# Patient Record
Sex: FEMALE | Race: WHITE | NOT HISPANIC OR LATINO | Employment: OTHER | URBAN - NONMETROPOLITAN AREA
[De-identification: names, ages, dates, MRNs, and addresses within clinical notes are randomized per-mention and may not be internally consistent; named-entity substitution may affect disease eponyms.]

---

## 2023-02-27 PROBLEM — M47.816 LUMBAR SPONDYLOSIS: Status: ACTIVE | Noted: 2023-02-27

## 2023-02-27 PROBLEM — E11.9 DM W/O COMPLICATION TYPE II (MULTI): Status: ACTIVE | Noted: 2023-02-27

## 2023-02-27 PROBLEM — I61.4: Status: ACTIVE | Noted: 2023-02-27

## 2023-02-27 PROBLEM — R25.1 TREMOR: Status: ACTIVE | Noted: 2023-02-27

## 2023-02-27 PROBLEM — H53.2 DIPLOPIA: Status: ACTIVE | Noted: 2023-02-27

## 2023-02-27 PROBLEM — R42 DIZZINESS, NONSPECIFIC: Status: ACTIVE | Noted: 2023-02-27

## 2023-02-27 PROBLEM — M47.812 CERVICAL FACET SYNDROME: Status: ACTIVE | Noted: 2023-02-27

## 2023-02-27 PROBLEM — G62.9 NEUROPATHY: Status: ACTIVE | Noted: 2023-02-27

## 2023-02-27 PROBLEM — I10 BENIGN ESSENTIAL HYPERTENSION: Status: ACTIVE | Noted: 2023-02-27

## 2023-02-27 PROBLEM — N28.9 RENAL INSUFFICIENCY: Status: ACTIVE | Noted: 2023-02-27

## 2023-02-27 PROBLEM — I63.9 CVA (CEREBRAL VASCULAR ACCIDENT) (MULTI): Status: ACTIVE | Noted: 2023-02-27

## 2023-02-27 PROBLEM — M25.512 PAIN IN JOINT OF LEFT SHOULDER: Status: ACTIVE | Noted: 2023-02-27

## 2023-02-27 PROBLEM — N28.1 RENAL CYST: Status: ACTIVE | Noted: 2023-02-27

## 2023-02-27 PROBLEM — G93.89 CEREBRAL VENTRICULOMEGALY: Status: ACTIVE | Noted: 2023-02-27

## 2023-02-27 PROBLEM — R41.0 CONFUSION: Status: ACTIVE | Noted: 2023-02-27

## 2023-02-27 PROBLEM — G47.00 INSOMNIA: Status: ACTIVE | Noted: 2023-02-27

## 2023-02-27 PROBLEM — R91.1 LUNG NODULE: Status: ACTIVE | Noted: 2023-02-27

## 2023-02-27 PROBLEM — M25.512 LEFT SHOULDER PAIN: Status: ACTIVE | Noted: 2023-02-27

## 2023-02-27 PROBLEM — D69.6 ACQUIRED THROMBOCYTOPENIA (CMS-HCC): Status: ACTIVE | Noted: 2023-02-27

## 2023-02-27 PROBLEM — M50.30 DEGENERATION OF CERVICAL INTERVERTEBRAL DISC: Status: ACTIVE | Noted: 2023-02-27

## 2023-02-27 PROBLEM — B02.9 HERPES ZOSTER: Status: ACTIVE | Noted: 2023-02-27

## 2023-02-27 PROBLEM — H49.23: Status: ACTIVE | Noted: 2023-02-27

## 2023-02-27 PROBLEM — E66.811 CLASS 1 OBESITY WITH BODY MASS INDEX (BMI) OF 31.0 TO 31.9 IN ADULT: Status: ACTIVE | Noted: 2023-02-27

## 2023-02-27 PROBLEM — L30.9 ECZEMA: Status: ACTIVE | Noted: 2023-02-27

## 2023-02-27 PROBLEM — M51.26 LUMBAR DISC HERNIATION: Status: ACTIVE | Noted: 2023-02-27

## 2023-02-27 PROBLEM — J32.2 ETHMOID SINUSITIS: Status: ACTIVE | Noted: 2023-02-27

## 2023-02-27 PROBLEM — H50.00 ESOTROPIA: Status: ACTIVE | Noted: 2023-02-27

## 2023-02-27 PROBLEM — E66.9 CLASS 1 OBESITY WITH BODY MASS INDEX (BMI) OF 31.0 TO 31.9 IN ADULT: Status: ACTIVE | Noted: 2023-02-27

## 2023-02-27 PROBLEM — M16.12 OSTEOARTHRITIS OF LEFT HIP: Status: ACTIVE | Noted: 2023-02-27

## 2023-02-27 PROBLEM — I61.9 ICH (INTRACEREBRAL HEMORRHAGE) (MULTI): Status: ACTIVE | Noted: 2023-02-27

## 2023-02-27 PROBLEM — M25.559 HIP PAIN: Status: ACTIVE | Noted: 2023-02-27

## 2023-02-27 PROBLEM — E55.9 VITAMIN D DEFICIENCY: Status: ACTIVE | Noted: 2023-02-27

## 2023-02-27 PROBLEM — K21.9 GERD (GASTROESOPHAGEAL REFLUX DISEASE): Status: ACTIVE | Noted: 2023-02-27

## 2023-02-27 PROBLEM — R10.13 EPIGASTRIC PAIN: Status: ACTIVE | Noted: 2023-02-27

## 2023-02-27 PROBLEM — E78.5 HLD (HYPERLIPIDEMIA): Status: ACTIVE | Noted: 2023-02-27

## 2023-02-27 PROBLEM — M96.1 POST LAMINECTOMY SYNDROME: Status: ACTIVE | Noted: 2023-02-27

## 2023-02-27 PROBLEM — M54.2 CERVICALGIA: Status: ACTIVE | Noted: 2023-02-27

## 2023-02-27 PROBLEM — R73.09 ELEVATED GLUCOSE: Status: ACTIVE | Noted: 2023-02-27

## 2023-02-27 PROBLEM — F32.A DEPRESSION: Status: ACTIVE | Noted: 2023-02-27

## 2023-02-27 PROBLEM — M79.602 LEFT ARM PAIN: Status: ACTIVE | Noted: 2023-02-27

## 2023-02-27 PROBLEM — J30.9 ALLERGIC RHINITIS: Status: ACTIVE | Noted: 2023-02-27

## 2023-02-27 PROBLEM — M48.061 LUMBAR STENOSIS: Status: ACTIVE | Noted: 2023-02-27

## 2023-02-27 PROBLEM — K75.81 NASH (NONALCOHOLIC STEATOHEPATITIS): Status: ACTIVE | Noted: 2023-02-27

## 2023-02-27 PROBLEM — M50.20 CERVICAL DISC HERNIATION: Status: ACTIVE | Noted: 2023-02-27

## 2023-02-27 PROBLEM — M47.812 SPONDYLOSIS OF CERVICAL REGION WITHOUT MYELOPATHY OR RADICULOPATHY: Status: ACTIVE | Noted: 2023-02-27

## 2023-02-27 PROBLEM — N39.0 URINARY TRACT INFECTION: Status: ACTIVE | Noted: 2023-02-27

## 2023-02-27 PROBLEM — M48.02 CERVICAL STENOSIS OF SPINE: Status: ACTIVE | Noted: 2023-02-27

## 2023-02-27 PROBLEM — M54.2 NECK PAIN: Status: ACTIVE | Noted: 2023-02-27

## 2023-02-27 PROBLEM — H91.90 HEARING DIFFICULTY: Status: ACTIVE | Noted: 2023-02-27

## 2023-02-27 RX ORDER — BUPROPION HYDROCHLORIDE 150 MG/1
1 TABLET ORAL DAILY
COMMUNITY
Start: 2022-02-22 | End: 2023-05-08

## 2023-02-27 RX ORDER — ATORVASTATIN CALCIUM 20 MG/1
1 TABLET, FILM COATED ORAL DAILY
COMMUNITY
Start: 2019-01-21 | End: 2023-03-08

## 2023-02-27 RX ORDER — ACETAMINOPHEN 500 MG
50 TABLET ORAL DAILY
COMMUNITY
Start: 2021-01-20 | End: 2023-06-30 | Stop reason: ALTCHOICE

## 2023-02-27 RX ORDER — ESCITALOPRAM OXALATE 20 MG/1
1 TABLET ORAL DAILY
COMMUNITY
Start: 2013-11-26 | End: 2023-03-14 | Stop reason: SDUPTHER

## 2023-02-27 RX ORDER — ALCLOMETASONE DIPROPIONATE 0.5 MG/G
CREAM TOPICAL 2 TIMES DAILY
COMMUNITY
Start: 2018-11-06

## 2023-02-27 RX ORDER — MELOXICAM 15 MG/1
1 TABLET ORAL DAILY
COMMUNITY
Start: 2019-06-28 | End: 2023-03-24

## 2023-02-27 RX ORDER — LORAZEPAM 1 MG/1
1 TABLET ORAL NIGHTLY PRN
COMMUNITY
Start: 2022-05-11 | End: 2023-08-29

## 2023-02-27 RX ORDER — ONDANSETRON 4 MG/1
1 TABLET, FILM COATED ORAL EVERY 6 HOURS
COMMUNITY
End: 2024-01-08

## 2023-02-27 RX ORDER — PROPRANOLOL HYDROCHLORIDE 80 MG/1
80 CAPSULE, EXTENDED RELEASE ORAL DAILY
COMMUNITY
End: 2023-04-03 | Stop reason: SDUPTHER

## 2023-03-08 DIAGNOSIS — E78.5 HYPERLIPIDEMIA, UNSPECIFIED HYPERLIPIDEMIA TYPE: Primary | ICD-10-CM

## 2023-03-08 DIAGNOSIS — I10 PRIMARY HYPERTENSION: ICD-10-CM

## 2023-03-08 RX ORDER — ATORVASTATIN CALCIUM 20 MG/1
TABLET, FILM COATED ORAL
Qty: 90 TABLET | Refills: 0 | Status: SHIPPED | OUTPATIENT
Start: 2023-03-08 | End: 2023-08-28

## 2023-03-08 RX ORDER — LISINOPRIL 20 MG/1
1 TABLET ORAL DAILY
COMMUNITY
Start: 2023-02-24 | End: 2023-03-08 | Stop reason: SDUPTHER

## 2023-03-08 RX ORDER — LISINOPRIL 20 MG/1
20 TABLET ORAL DAILY
Qty: 30 TABLET | Refills: 0 | Status: SHIPPED | OUTPATIENT
Start: 2023-03-08 | End: 2023-03-14 | Stop reason: SDUPTHER

## 2023-03-10 NOTE — PROGRESS NOTES
Subjective   Patient ID: Karina Lee is a 82 y.o. female who presents for Follow-up (Discuss home health; watery eyes, especially in AM).  HPI  A 82 year White F presenting for followup:    Hypertensive urgency: Had her med changed. Did not bring list. No further shortness of breath.    Left eye drainage: She is getting tearing. No itching or pain, she is getting discomfort at night. Going on for a couple of weeks. Having rhinorrhea as well.      Grief, poor short term memory: She is following with Shelley interiano.Doing better lately.She is currently on lexapro and has ativan for prn use which she is using sparingly.      DM: Not on meds, completing recent labs.      Depression: Doing much better since returning to previous dose of lexapro. No SE's. Feeling more depressed in the last couple months. Low motivation. Feeling very down. Not sleeping well. Spending a lot of time in the computer. She hasn't been leaving her computer room much. Stopped eating carbs starting at the beginning of February.      Hemorrhagic stroke: Dizziness is chronic/unchanged.     HTN: On labetalol alone, no SE's.     GERD: Doing well off omeprazole.      HLD: stopped her statin because of upset stomach.      Osteoporosis, osteoarthritis: Taking meloxicam.     All other systems have been reviewed and are negative for complaint     Review of Systems    Objective   Physical Exam  Gen: No acute distress. Alert and oriented x3.   HEENT: Normocephalic, atraumatic. PERRLA and EOMI, no conjunctival injection. B/L EAC are clear, TM's viewed are WNL. No rhinorrhea, no oropharyngeal lesions.  Neck: No lymphadenopathy, thyroid WNL.  CV: Regular rate and rhythm. Normal S1/S2.  Resp: CTAB/L. No wheezes or rhonchi appreciated.  Abdomen: Soft. Nontender. Nondistended. Bowel sounds normoactive. No guarding or rigidity.  Derm: Skin is warm and dry. No rashes appreciated or suspicious lesions noted.   Neuro: Cranial nerves intact. Normal gait.  Psych:  Appropriate mood and affect. Normal speech and eye contact.   Extremities: No deformities appreciated. No severe edema.    Assessment/Plan        This is an 83yo female here for a followup appointment:    #Hypertensive urgency  #Weakness  Ordering home health through [facility]     #Poor short term memory  MOCA 21/30  discussed support systems that could be helpful  we will call her daughter as well     #Depression:  #Insomnia  on lexapro and wellbutrni  on lorazepam for sleep, CSA signed, she feels it is helping     #Hemorrhagic stroke   has residual symptoms for previous stroke (weakness, dizziness)  On statin, BP is controlled, recently completed at home PT  Refill meclizine for dizziness     #HTN  Currently on labetalol, lisinopril, and lasix  Stop inderal and amlodipine     #Renal cysts  seen on CT/MRI by nephro- no need for intervention.  Monitored by imaging through nephro (Dr. Briscoe)     #GERD  controlled on PPI     #HLD  stopped her statin  rx sent lower dose atorvastatin     #DM  diet controlled, monitoring a1c     #L shoulder pain  cervical spondylosis- on meloxicam     #Cervical facet syndrome  not currently seeing pain management     #Osteoporosis  on bisphosphonates, will continue management     #Vit D def  rx sent 2000u dailty     #NAFLD  sees hepatologist yearly for this- no new symptoms  LFTs returning to normal since recent UTI exacerbated her transaminitis     #Renal insufficiency  Baseline, monitoring     HCM:  UTD for flu, COVID vaccine  Past the age for screening, will go based on symptoms

## 2023-03-13 RX ORDER — FUROSEMIDE 20 MG/1
1 TABLET ORAL DAILY
COMMUNITY
Start: 2023-02-24 | End: 2023-03-14 | Stop reason: SDUPTHER

## 2023-03-13 RX ORDER — GABAPENTIN 300 MG/1
CAPSULE ORAL
COMMUNITY
End: 2023-06-30 | Stop reason: ALTCHOICE

## 2023-03-13 RX ORDER — NAPROXEN SODIUM 220 MG/1
1 TABLET, FILM COATED ORAL DAILY
COMMUNITY

## 2023-03-13 RX ORDER — SERTRALINE HYDROCHLORIDE 50 MG/1
1 TABLET, FILM COATED ORAL DAILY
COMMUNITY
Start: 2023-01-05 | End: 2023-03-14

## 2023-03-13 RX ORDER — LABETALOL 100 MG/1
1 TABLET, FILM COATED ORAL 2 TIMES DAILY
COMMUNITY
Start: 2022-12-17 | End: 2023-03-17 | Stop reason: SDUPTHER

## 2023-03-13 RX ORDER — AMLODIPINE BESYLATE 5 MG/1
1 TABLET ORAL DAILY
COMMUNITY
End: 2023-03-14 | Stop reason: ALTCHOICE

## 2023-03-13 RX ORDER — OMEPRAZOLE 40 MG/1
CAPSULE, DELAYED RELEASE ORAL
COMMUNITY

## 2023-03-13 RX ORDER — PROMETHAZINE HYDROCHLORIDE 12.5 MG/1
TABLET ORAL 4 TIMES DAILY
COMMUNITY
Start: 2019-05-13

## 2023-03-14 ENCOUNTER — OFFICE VISIT (OUTPATIENT)
Dept: PRIMARY CARE | Facility: CLINIC | Age: 83
End: 2023-03-14
Payer: MEDICARE

## 2023-03-14 VITALS
HEART RATE: 55 BPM | SYSTOLIC BLOOD PRESSURE: 118 MMHG | DIASTOLIC BLOOD PRESSURE: 79 MMHG | HEIGHT: 66 IN | BODY MASS INDEX: 31.66 KG/M2 | WEIGHT: 197 LBS

## 2023-03-14 DIAGNOSIS — H10.9 CONJUNCTIVITIS, UNSPECIFIED CONJUNCTIVITIS TYPE, UNSPECIFIED LATERALITY: ICD-10-CM

## 2023-03-14 DIAGNOSIS — R73.03 PREDIABETES: ICD-10-CM

## 2023-03-14 DIAGNOSIS — I10 PRIMARY HYPERTENSION: ICD-10-CM

## 2023-03-14 DIAGNOSIS — F41.9 ANXIETY: Primary | ICD-10-CM

## 2023-03-14 PROCEDURE — 3074F SYST BP LT 130 MM HG: CPT | Performed by: FAMILY MEDICINE

## 2023-03-14 PROCEDURE — 99214 OFFICE O/P EST MOD 30 MIN: CPT | Performed by: FAMILY MEDICINE

## 2023-03-14 PROCEDURE — 3078F DIAST BP <80 MM HG: CPT | Performed by: FAMILY MEDICINE

## 2023-03-14 PROCEDURE — 1159F MED LIST DOCD IN RCRD: CPT | Performed by: FAMILY MEDICINE

## 2023-03-14 PROCEDURE — 1036F TOBACCO NON-USER: CPT | Performed by: FAMILY MEDICINE

## 2023-03-14 RX ORDER — ESCITALOPRAM OXALATE 20 MG/1
20 TABLET ORAL DAILY
Qty: 30 TABLET | Refills: 11 | Status: SHIPPED | OUTPATIENT
Start: 2023-03-14 | End: 2023-05-15

## 2023-03-14 RX ORDER — LISINOPRIL 20 MG/1
20 TABLET ORAL DAILY
Qty: 30 TABLET | Refills: 0 | Status: SHIPPED | OUTPATIENT
Start: 2023-03-14 | End: 2023-03-14 | Stop reason: SDUPTHER

## 2023-03-14 RX ORDER — FUROSEMIDE 20 MG/1
20 TABLET ORAL DAILY
Qty: 30 TABLET | Refills: 11 | Status: SHIPPED | OUTPATIENT
Start: 2023-03-14 | End: 2023-03-14 | Stop reason: SDUPTHER

## 2023-03-14 RX ORDER — LISINOPRIL 20 MG/1
20 TABLET ORAL DAILY
Qty: 30 TABLET | Refills: 0 | Status: SHIPPED | OUTPATIENT
Start: 2023-03-14 | End: 2023-06-27

## 2023-03-14 RX ORDER — FUROSEMIDE 20 MG/1
20 TABLET ORAL DAILY
Qty: 30 TABLET | Refills: 11 | Status: SHIPPED | OUTPATIENT
Start: 2023-03-14 | End: 2024-03-22

## 2023-03-14 RX ORDER — TOBRAMYCIN 3 MG/ML
1 SOLUTION/ DROPS OPHTHALMIC EVERY 4 HOURS
Qty: 4.2 ML | Refills: 0 | Status: SHIPPED | OUTPATIENT
Start: 2023-03-14 | End: 2023-03-28

## 2023-03-14 NOTE — PATIENT INSTRUCTIONS
I sent in an antibiotic eyte drop for eye drainage, tobramycin.    I am re-ordering home health or nursing and home health aide through [facility].     For blood pressure I would like her to take labetalol, lisinopril, and lasix.  Make sure you are not taking amlodipine or propranolol.    Check and make sure you are taking lexapro and not sertraline for anxiety.

## 2023-03-15 ENCOUNTER — TELEPHONE (OUTPATIENT)
Dept: PRIMARY CARE | Facility: CLINIC | Age: 83
End: 2023-03-15
Payer: MEDICARE

## 2023-03-17 DIAGNOSIS — I10 PRIMARY HYPERTENSION: Primary | ICD-10-CM

## 2023-03-17 RX ORDER — LABETALOL 100 MG/1
1 TABLET, FILM COATED ORAL 2 TIMES DAILY
Qty: 180 TABLET | Refills: 3 | Status: SHIPPED | OUTPATIENT
Start: 2023-03-17 | End: 2024-03-16

## 2023-03-24 DIAGNOSIS — M19.90 ARTHRITIS: Primary | ICD-10-CM

## 2023-03-24 RX ORDER — MELOXICAM 15 MG/1
TABLET ORAL
Qty: 90 TABLET | Refills: 0 | Status: SHIPPED | OUTPATIENT
Start: 2023-03-24 | End: 2023-06-15

## 2023-03-28 DIAGNOSIS — H10.9 CONJUNCTIVITIS, UNSPECIFIED CONJUNCTIVITIS TYPE, UNSPECIFIED LATERALITY: ICD-10-CM

## 2023-03-28 RX ORDER — TOBRAMYCIN 3 MG/ML
SOLUTION/ DROPS OPHTHALMIC
Qty: 5 ML | Refills: 0 | Status: SHIPPED | OUTPATIENT
Start: 2023-03-28

## 2023-04-03 ENCOUNTER — TELEPHONE (OUTPATIENT)
Dept: PRIMARY CARE | Facility: CLINIC | Age: 83
End: 2023-04-03
Payer: MEDICARE

## 2023-04-03 DIAGNOSIS — R25.1 TREMOR: Primary | ICD-10-CM

## 2023-04-03 RX ORDER — PROPRANOLOL HYDROCHLORIDE 80 MG/1
80 CAPSULE, EXTENDED RELEASE ORAL DAILY
Qty: 30 CAPSULE | Refills: 1 | Status: SHIPPED | OUTPATIENT
Start: 2023-04-03 | End: 2023-06-30 | Stop reason: ALTCHOICE

## 2023-04-03 NOTE — TELEPHONE ENCOUNTER
Karina called this morning. She stated that you mentioned for her to start taking propranolol, although there wasn't an order sent to [facility] for it.

## 2023-04-26 ENCOUNTER — TELEPHONE (OUTPATIENT)
Dept: PRIMARY CARE | Facility: CLINIC | Age: 83
End: 2023-04-26
Payer: MEDICARE

## 2023-04-26 NOTE — TELEPHONE ENCOUNTER
Pt states she has watery eyes and clear nasal drip. Was wondering what she could take for allergies.     Shannan Beckman MA

## 2023-05-06 DIAGNOSIS — F32.A DEPRESSION, UNSPECIFIED DEPRESSION TYPE: Primary | ICD-10-CM

## 2023-05-08 RX ORDER — BUPROPION HYDROCHLORIDE 150 MG/1
TABLET ORAL
Qty: 90 TABLET | Refills: 0 | Status: SHIPPED | OUTPATIENT
Start: 2023-05-08 | End: 2023-08-04

## 2023-05-08 RX ORDER — PREDNISOLONE ACETATE 10 MG/ML
SUSPENSION/ DROPS OPHTHALMIC
COMMUNITY
Start: 2023-04-06 | End: 2023-06-30 | Stop reason: ALTCHOICE

## 2023-05-13 DIAGNOSIS — F41.9 ANXIETY: ICD-10-CM

## 2023-05-15 RX ORDER — ESCITALOPRAM OXALATE 20 MG/1
TABLET ORAL
Qty: 30 TABLET | Refills: 0 | Status: SHIPPED | OUTPATIENT
Start: 2023-05-15 | End: 2023-06-28

## 2023-06-15 DIAGNOSIS — M19.90 ARTHRITIS: ICD-10-CM

## 2023-06-15 RX ORDER — MELOXICAM 15 MG/1
TABLET ORAL
Qty: 90 TABLET | Refills: 0 | Status: SHIPPED | OUTPATIENT
Start: 2023-06-15 | End: 2023-10-27

## 2023-06-22 LAB
ALANINE AMINOTRANSFERASE (SGPT) (U/L) IN SER/PLAS: 26 U/L (ref 7–45)
ALBUMIN (G/DL) IN SER/PLAS: 4.4 G/DL (ref 3.4–5)
ALKALINE PHOSPHATASE (U/L) IN SER/PLAS: 53 U/L (ref 33–136)
AMPHETAMINE (PRESENCE) IN URINE BY SCREEN METHOD: NORMAL
ANION GAP IN SER/PLAS: 10 MMOL/L (ref 10–20)
ASPARTATE AMINOTRANSFERASE (SGOT) (U/L) IN SER/PLAS: 22 U/L (ref 9–39)
BARBITURATES PRESENCE IN URINE BY SCREEN METHOD: NORMAL
BENZODIAZEPINE (PRESENCE) IN URINE BY SCREEN METHOD: NORMAL
BILIRUBIN TOTAL (MG/DL) IN SER/PLAS: 0.7 MG/DL (ref 0–1.2)
CALCIUM (MG/DL) IN SER/PLAS: 9.8 MG/DL (ref 8.6–10.3)
CANNABINOIDS IN URINE BY SCREEN METHOD: NORMAL
CARBON DIOXIDE, TOTAL (MMOL/L) IN SER/PLAS: 30 MMOL/L (ref 21–32)
CHLORIDE (MMOL/L) IN SER/PLAS: 101 MMOL/L (ref 98–107)
CHOLESTEROL (MG/DL) IN SER/PLAS: 141 MG/DL (ref 0–199)
CHOLESTEROL IN HDL (MG/DL) IN SER/PLAS: 62.3 MG/DL
CHOLESTEROL/HDL RATIO: 2.3
COCAINE (PRESENCE) IN URINE BY SCREEN METHOD: NORMAL
CREATININE (MG/DL) IN SER/PLAS: 1.39 MG/DL (ref 0.5–1.05)
DRUG SCREEN COMMENT URINE: NORMAL
ERYTHROCYTE DISTRIBUTION WIDTH (RATIO) BY AUTOMATED COUNT: 13.6 % (ref 11.5–14.5)
ERYTHROCYTE MEAN CORPUSCULAR HEMOGLOBIN CONCENTRATION (G/DL) BY AUTOMATED: 32.6 G/DL (ref 32–36)
ERYTHROCYTE MEAN CORPUSCULAR VOLUME (FL) BY AUTOMATED COUNT: 97 FL (ref 80–100)
ERYTHROCYTES (10*6/UL) IN BLOOD BY AUTOMATED COUNT: 4.43 X10E12/L (ref 4–5.2)
FENTANYL URINE: NORMAL
GFR FEMALE: 38 ML/MIN/1.73M2
GLUCOSE (MG/DL) IN SER/PLAS: 112 MG/DL (ref 74–99)
HEMATOCRIT (%) IN BLOOD BY AUTOMATED COUNT: 42.9 % (ref 36–46)
HEMOGLOBIN (G/DL) IN BLOOD: 14 G/DL (ref 12–16)
LDL: 58 MG/DL (ref 0–99)
LEUKOCYTES (10*3/UL) IN BLOOD BY AUTOMATED COUNT: 3.7 X10E9/L (ref 4.4–11.3)
METHADONE (PRESENCE) IN URINE BY SCREEN METHOD: NORMAL
OPIATES (PRESENCE) IN URINE BY SCREEN METHOD: NORMAL
OXYCODONE (PRESENCE) IN URINE BY SCREEN METHOD: NORMAL
PHENCYCLIDINE (PRESENCE) IN URINE BY SCREEN METHOD: NORMAL
PLATELETS (10*3/UL) IN BLOOD AUTOMATED COUNT: 150 X10E9/L (ref 150–450)
POTASSIUM (MMOL/L) IN SER/PLAS: 4.5 MMOL/L (ref 3.5–5.3)
PROTEIN TOTAL: 7.3 G/DL (ref 6.4–8.2)
SODIUM (MMOL/L) IN SER/PLAS: 136 MMOL/L (ref 136–145)
TRIGLYCERIDE (MG/DL) IN SER/PLAS: 105 MG/DL (ref 0–149)
UREA NITROGEN (MG/DL) IN SER/PLAS: 38 MG/DL (ref 6–23)
VLDL: 21 MG/DL (ref 0–40)

## 2023-06-23 LAB
CALCIDIOL (25 OH VITAMIN D3) (NG/ML) IN SER/PLAS: 26 NG/ML
ESTIMATED AVERAGE GLUCOSE FOR HBA1C: 123 MG/DL
HEMOGLOBIN A1C/HEMOGLOBIN TOTAL IN BLOOD: 5.9 %

## 2023-06-27 DIAGNOSIS — I10 PRIMARY HYPERTENSION: ICD-10-CM

## 2023-06-27 PROBLEM — F41.9 ANXIETY: Status: ACTIVE | Noted: 2022-12-06

## 2023-06-27 PROBLEM — M19.90 ARTHRITIS: Status: ACTIVE | Noted: 2023-06-27

## 2023-06-27 PROBLEM — F43.23 ADJUSTMENT DISORDER WITH MIXED ANXIETY AND DEPRESSED MOOD: Status: ACTIVE | Noted: 2022-11-02

## 2023-06-27 PROBLEM — I69.30 SEQUELAE, POST-STROKE: Status: ACTIVE | Noted: 2018-08-23

## 2023-06-27 PROBLEM — K76.9 LIVER DISEASE: Status: ACTIVE | Noted: 2023-06-27

## 2023-06-27 PROBLEM — R26.9 ABNORMALITY OF GAIT: Status: ACTIVE | Noted: 2018-08-23

## 2023-06-27 PROBLEM — F33.1 MODERATE EPISODE OF RECURRENT MAJOR DEPRESSIVE DISORDER (MULTI): Chronic | Status: ACTIVE | Noted: 2022-12-06

## 2023-06-27 RX ORDER — LISINOPRIL 20 MG/1
TABLET ORAL
Qty: 30 TABLET | Refills: 0 | Status: SHIPPED | OUTPATIENT
Start: 2023-06-27 | End: 2023-10-16

## 2023-06-27 NOTE — PROGRESS NOTES
6 month follow up- DO MADDIE    A 82 year White F presenting for followup:    She is having left eye drainage and discomfort now for 6 months. For the last 4 months she has been having significant clear rhinorrhea when she bends forwards. No sore throat or ear pain.      Grief, poor short term memory: She is following with Shelley interiano. Doing better lately. She is currently on lexapro and has ativan for prn use which she is using sparingly. She does not plan on getting any further refills of the ativan at this point.     DM: Diet controlled. Completed recent labs.      Hemorrhagic stroke: Dizziness is chronic/unchanged.     HTN: On labetalol and lisinopril, no SE's.     GERD: Doing well off omeprazole.      HLD: Back on atorvastatin, no SE's.     Osteoporosis, osteoarthritis: Taking meloxicam.     All other systems have been reviewed and are negative for complaint     Gen: No acute distress. Alert and oriented x3.   HEENT: Normocephalic, atraumatic. PERRLA and EOMI, no conjunctival injection. B/L EAC are clear, TM's viewed are WNL. No rhinorrhea, no oropharyngeal lesions.  Neck: No lymphadenopathy, thyroid WNL.  CV: Regular rate and rhythm. Normal S1/S2.  Resp: CTAB/L. No wheezes or rhonchi appreciated.  Abdomen: Soft. Nontender. Nondistended. Bowel sounds normoactive. No guarding or rigidity.  Derm: Skin is warm and dry. No rashes appreciated or suspicious lesions noted.   Neuro: Cranial nerves intact. Normal gait, ambulating with a rollator.  Psych: Appropriate mood and affect. Normal speech and eye contact.   Extremities: No deformities appreciated. No severe edema.     83yo female here for followup:    #HTN  Controlled on lasix, labetalol, and furosemide     #Poor short term memory  MOCA 21/30 (8/23/22)  discussed support systems that could be helpful     #Depression:  #Insomnia  on lexapro and wellbutrin  Stopping lorazepam prn     #Hemorrhagic stroke   has residual symptoms for previous stroke (weakness,  dizziness)  On statin, BP is controlled, recently completed at home PT  Refill meclizine for dizziness     #Renal cysts  seen on CT/MRI by nephro- no need for intervention.  Monitored by imaging through nephro (Dr. Briscoe)     #GERD  controlled on PPI     #HLD  stopped her statin  rx sent lower dose atorvastatin     #DM  diet controlled, monitoring a1c     #L shoulder pain  cervical spondylosis- on meloxicam     #Cervical facet syndrome  not currently seeing pain management     #Osteoporosis  on bisphosphonates, will continue management     #Vit D def  rx sent 2000u dailty     #Renal insufficiency  Baseline, monitoring     HCM:  UTD for flu, COVID vaccine  Past the age for screening, will go based on symptoms

## 2023-06-28 DIAGNOSIS — F41.9 ANXIETY: ICD-10-CM

## 2023-06-28 RX ORDER — ESCITALOPRAM OXALATE 20 MG/1
TABLET ORAL
Qty: 30 TABLET | Refills: 0 | Status: SHIPPED | OUTPATIENT
Start: 2023-06-28 | End: 2024-03-22

## 2023-06-30 ENCOUNTER — OFFICE VISIT (OUTPATIENT)
Dept: PRIMARY CARE | Facility: CLINIC | Age: 83
End: 2023-06-30
Payer: MEDICARE

## 2023-06-30 VITALS
BODY MASS INDEX: 32.3 KG/M2 | WEIGHT: 201 LBS | HEIGHT: 66 IN | DIASTOLIC BLOOD PRESSURE: 86 MMHG | HEART RATE: 64 BPM | SYSTOLIC BLOOD PRESSURE: 153 MMHG

## 2023-06-30 DIAGNOSIS — J01.90 ACUTE SINUSITIS, RECURRENCE NOT SPECIFIED, UNSPECIFIED LOCATION: ICD-10-CM

## 2023-06-30 DIAGNOSIS — R73.09 ELEVATED GLUCOSE: Primary | ICD-10-CM

## 2023-06-30 DIAGNOSIS — E78.5 HYPERLIPIDEMIA, UNSPECIFIED HYPERLIPIDEMIA TYPE: ICD-10-CM

## 2023-06-30 DIAGNOSIS — Z91.09 ENVIRONMENTAL ALLERGIES: ICD-10-CM

## 2023-06-30 PROCEDURE — 3077F SYST BP >= 140 MM HG: CPT | Performed by: FAMILY MEDICINE

## 2023-06-30 PROCEDURE — 1157F ADVNC CARE PLAN IN RCRD: CPT | Performed by: FAMILY MEDICINE

## 2023-06-30 PROCEDURE — 3079F DIAST BP 80-89 MM HG: CPT | Performed by: FAMILY MEDICINE

## 2023-06-30 PROCEDURE — 1159F MED LIST DOCD IN RCRD: CPT | Performed by: FAMILY MEDICINE

## 2023-06-30 PROCEDURE — 1036F TOBACCO NON-USER: CPT | Performed by: FAMILY MEDICINE

## 2023-06-30 PROCEDURE — 99214 OFFICE O/P EST MOD 30 MIN: CPT | Performed by: FAMILY MEDICINE

## 2023-06-30 RX ORDER — CETIRIZINE HYDROCHLORIDE 10 MG/1
10 TABLET ORAL DAILY
Qty: 30 TABLET | Refills: 5 | Status: SHIPPED | OUTPATIENT
Start: 2023-06-30 | End: 2023-12-27

## 2023-06-30 RX ORDER — AMOXICILLIN 500 MG/1
500 CAPSULE ORAL EVERY 12 HOURS SCHEDULED
Qty: 20 CAPSULE | Refills: 0 | Status: SHIPPED | OUTPATIENT
Start: 2023-06-30 | End: 2023-07-10

## 2023-07-14 DIAGNOSIS — R21 RASH: Primary | ICD-10-CM

## 2023-07-14 RX ORDER — CLOTRIMAZOLE AND BETAMETHASONE DIPROPIONATE 10; .64 MG/G; MG/G
1 CREAM TOPICAL 2 TIMES DAILY
COMMUNITY
End: 2023-07-14 | Stop reason: SDUPTHER

## 2023-07-14 RX ORDER — CLOTRIMAZOLE AND BETAMETHASONE DIPROPIONATE 10; .64 MG/G; MG/G
1 CREAM TOPICAL 2 TIMES DAILY
Qty: 30 G | Refills: 1 | Status: SHIPPED | OUTPATIENT
Start: 2023-07-14

## 2023-07-24 ENCOUNTER — LAB (OUTPATIENT)
Dept: LAB | Facility: LAB | Age: 83
End: 2023-07-24
Payer: MEDICARE

## 2023-07-24 DIAGNOSIS — N30.00 ACUTE CYSTITIS WITHOUT HEMATURIA: Primary | ICD-10-CM

## 2023-07-24 DIAGNOSIS — N39.0 URINARY TRACT INFECTION WITHOUT HEMATURIA, SITE UNSPECIFIED: ICD-10-CM

## 2023-07-24 LAB
APPEARANCE, URINE: CLEAR
BILIRUBIN, URINE: NEGATIVE
BLOOD, URINE: NEGATIVE
COLOR, URINE: YELLOW
GLUCOSE, URINE: NEGATIVE MG/DL
KETONES, URINE: NEGATIVE MG/DL
LEUKOCYTE ESTERASE, URINE: ABNORMAL
NITRITE, URINE: NEGATIVE
PH, URINE: 6 (ref 5–8)
PROTEIN, URINE: NEGATIVE MG/DL
RBC, URINE: ABNORMAL /HPF (ref 0–5)
SPECIFIC GRAVITY, URINE: 1.01 (ref 1–1.03)
SQUAMOUS EPITHELIAL CELLS, URINE: ABNORMAL /HPF
TRANSITIONAL EPITHELIAL CELLS, URINE: ABNORMAL /HPF
UROBILINOGEN, URINE: <2 MG/DL (ref 0–1.9)
WBC, URINE: ABNORMAL /HPF (ref 0–5)

## 2023-07-24 PROCEDURE — 87086 URINE CULTURE/COLONY COUNT: CPT

## 2023-07-24 PROCEDURE — 81001 URINALYSIS AUTO W/SCOPE: CPT

## 2023-07-24 RX ORDER — NITROFURANTOIN 25; 75 MG/1; MG/1
100 CAPSULE ORAL 2 TIMES DAILY
Qty: 10 CAPSULE | Refills: 0 | Status: SHIPPED | OUTPATIENT
Start: 2023-07-24 | End: 2023-07-29

## 2023-07-26 LAB — URINE CULTURE: NORMAL

## 2023-08-04 DIAGNOSIS — F32.A DEPRESSION, UNSPECIFIED DEPRESSION TYPE: ICD-10-CM

## 2023-08-04 RX ORDER — BUPROPION HYDROCHLORIDE 150 MG/1
TABLET ORAL
Qty: 90 TABLET | Refills: 0 | Status: SHIPPED | OUTPATIENT
Start: 2023-08-04 | End: 2023-11-09

## 2023-08-15 DIAGNOSIS — R30.0 DYSURIA: ICD-10-CM

## 2023-08-15 NOTE — PROGRESS NOTES
Pt still having some urgency but just finnished taking nitrofurination and did not like it so she wants to make sure the UTI is gone.

## 2023-08-16 ENCOUNTER — LAB (OUTPATIENT)
Dept: LAB | Facility: LAB | Age: 83
End: 2023-08-16
Payer: MEDICARE

## 2023-08-16 DIAGNOSIS — R30.0 DYSURIA: ICD-10-CM

## 2023-08-16 LAB
APPEARANCE, URINE: CLEAR
BILIRUBIN, URINE: NEGATIVE
BLOOD, URINE: NEGATIVE
COLOR, URINE: YELLOW
GLUCOSE, URINE: NEGATIVE MG/DL
KETONES, URINE: NEGATIVE MG/DL
LEUKOCYTE ESTERASE, URINE: NEGATIVE
NITRITE, URINE: NEGATIVE
PH, URINE: 6 (ref 5–8)
PROTEIN, URINE: NEGATIVE MG/DL
SPECIFIC GRAVITY, URINE: 1.01 (ref 1–1.03)
UROBILINOGEN, URINE: <2 MG/DL (ref 0–1.9)

## 2023-08-16 PROCEDURE — 87086 URINE CULTURE/COLONY COUNT: CPT

## 2023-08-16 PROCEDURE — 81003 URINALYSIS AUTO W/O SCOPE: CPT

## 2023-08-18 LAB — URINE CULTURE: NORMAL

## 2023-08-28 DIAGNOSIS — E78.5 HYPERLIPIDEMIA, UNSPECIFIED HYPERLIPIDEMIA TYPE: ICD-10-CM

## 2023-08-28 RX ORDER — ATORVASTATIN CALCIUM 20 MG/1
TABLET, FILM COATED ORAL
Qty: 100 TABLET | Refills: 0 | Status: SHIPPED | OUTPATIENT
Start: 2023-08-28 | End: 2023-11-20

## 2023-08-29 DIAGNOSIS — F41.9 ANXIETY: ICD-10-CM

## 2023-08-29 DIAGNOSIS — I10 BENIGN ESSENTIAL HYPERTENSION: ICD-10-CM

## 2023-08-29 RX ORDER — LORAZEPAM 1 MG/1
TABLET ORAL
Qty: 30 TABLET | Refills: 0 | Status: SHIPPED | OUTPATIENT
Start: 2023-08-29 | End: 2023-10-23

## 2023-10-14 DIAGNOSIS — I10 PRIMARY HYPERTENSION: ICD-10-CM

## 2023-10-16 RX ORDER — LISINOPRIL 20 MG/1
TABLET ORAL
Qty: 30 TABLET | Refills: 0 | Status: SHIPPED | OUTPATIENT
Start: 2023-10-16 | End: 2023-11-10

## 2023-10-22 DIAGNOSIS — F41.9 ANXIETY: ICD-10-CM

## 2023-10-23 RX ORDER — LORAZEPAM 1 MG/1
TABLET ORAL
Qty: 30 TABLET | Refills: 0 | Status: SHIPPED | OUTPATIENT
Start: 2023-10-23 | End: 2023-11-28

## 2023-10-27 DIAGNOSIS — M19.90 ARTHRITIS: ICD-10-CM

## 2023-10-27 RX ORDER — MELOXICAM 15 MG/1
TABLET ORAL
Qty: 90 TABLET | Refills: 0 | Status: SHIPPED | OUTPATIENT
Start: 2023-10-27 | End: 2024-01-22

## 2023-11-09 DIAGNOSIS — F32.A DEPRESSION, UNSPECIFIED DEPRESSION TYPE: ICD-10-CM

## 2023-11-09 RX ORDER — BUPROPION HYDROCHLORIDE 150 MG/1
TABLET ORAL
Qty: 90 TABLET | Refills: 0 | Status: SHIPPED | OUTPATIENT
Start: 2023-11-09 | End: 2024-02-09

## 2023-11-10 DIAGNOSIS — I10 PRIMARY HYPERTENSION: ICD-10-CM

## 2023-11-10 RX ORDER — LISINOPRIL 20 MG/1
TABLET ORAL
Qty: 30 TABLET | Refills: 0 | Status: SHIPPED | OUTPATIENT
Start: 2023-11-10 | End: 2023-12-13

## 2023-11-18 DIAGNOSIS — E78.5 HYPERLIPIDEMIA, UNSPECIFIED HYPERLIPIDEMIA TYPE: ICD-10-CM

## 2023-11-20 RX ORDER — ATORVASTATIN CALCIUM 20 MG/1
TABLET, FILM COATED ORAL
Qty: 100 TABLET | Refills: 0 | Status: SHIPPED | OUTPATIENT
Start: 2023-11-20

## 2023-11-28 DIAGNOSIS — F41.9 ANXIETY: ICD-10-CM

## 2023-11-28 RX ORDER — LORAZEPAM 1 MG/1
TABLET ORAL
Qty: 30 TABLET | Refills: 0 | Status: SHIPPED | OUTPATIENT
Start: 2023-11-28 | End: 2024-01-08

## 2023-12-13 DIAGNOSIS — I10 PRIMARY HYPERTENSION: ICD-10-CM

## 2023-12-13 RX ORDER — LISINOPRIL 20 MG/1
TABLET ORAL
Qty: 30 TABLET | Refills: 0 | Status: SHIPPED | OUTPATIENT
Start: 2023-12-13 | End: 2024-01-29

## 2024-01-03 PROBLEM — E63.8 NUTRITIONAL INTAKE LESS THAN BODY REQUIREMENTS: Status: RESOLVED | Noted: 2024-01-03 | Resolved: 2024-01-03

## 2024-01-03 PROBLEM — M15.9 OSTEOARTHRITIS OF MULTIPLE JOINTS: Status: ACTIVE | Noted: 2024-01-03

## 2024-01-03 PROBLEM — R52 PAIN: Status: RESOLVED | Noted: 2024-01-03 | Resolved: 2024-01-03

## 2024-01-03 NOTE — PROGRESS NOTES
Nursing Note:  - 6 mo  - ACP  - flu  - PNA    Karina Lee is a 83 y.o. female who presents for No chief complaint on file.    She is having left eye drainage and discomfort now for 6 months. For the last 4 months she has been having significant clear rhinorrhea when she bends forwards. No sore throat or ear pain.      Grief, poor short term memory: She is following with Shelley interiano. Doing better lately. She is currently on lexapro and has ativan for prn use which she is using sparingly. She does not plan on getting any further refills of the ativan at this point.     DM: Diet controlled. Completed recent labs.      Hemorrhagic stroke: Dizziness is chronic/unchanged.     HTN: On labetalol and lisinopril, no SE's.     GERD: Doing well off omeprazole.      HLD: Back on atorvastatin, no SE's.     Osteoporosis, osteoarthritis: Taking meloxicam.     All other systems have been reviewed and are negative for complaint     Objective   There were no vitals taken for this visit.    Gen: No acute distress, alert and oriented x3, pleasant   HEENT: moist mucous membranes, b/l external auditory canals are clear of debris, TMs within normal limits, no oropharyngeal lesions, eomi, perrla   Neck: thyroid within normal limits, no lymphadenopathy   CV: RRR, normal S1/S2, no murmur   Resp: Clear to auscultation bilaterally, no wheezes or rhonchi appreciated  Abd: soft, nontender, non-distended, no guarding/rigidity, bowel sounds present  Extr: no edema, no calf tenderness  Derm: Skin is warm and dry, no rashes appreciated  Psych: mood is good, affect is congruent, good hygiene, normal speech and eye contact  Neuro: cranial nerves grossly intact, normal gait    Assessment/Plan     #HTN  Controlled on lasix, labetalol, and furosemide     #Poor short term memory  MOCA 21/30 (8/23/22)  discussed support systems that could be helpful     #Depression:  #Insomnia  on lexapro and wellbutrin  Stopping lorazepam prn     #Hemorrhagic  stroke   has residual symptoms for previous stroke (weakness, dizziness)  On statin, BP is controlled, recently completed at home PT  Refill meclizine for dizziness     #Renal cysts  seen on CT/MRI by nephro- no need for intervention.  Monitored by imaging through nephro (Dr. Briscoe)     #GERD  controlled on PPI     #HLD  stopped her statin  rx sent lower dose atorvastatin     #DM  diet controlled, monitoring a1c     #L shoulder pain  cervical spondylosis- on meloxicam     #Cervical facet syndrome  not currently seeing pain management     #Osteoporosis  on bisphosphonates, will continue management     #Vit D def  rx sent 2000u [id number]     #Renal insufficiency  Baseline, monitoring     HCM:  UTD for flu, COVID vaccine  Past the age for screening, will go based on symptoms

## 2024-01-04 ENCOUNTER — LAB (OUTPATIENT)
Dept: LAB | Facility: LAB | Age: 84
End: 2024-01-04
Payer: MEDICARE

## 2024-01-04 DIAGNOSIS — N39.0 URINARY TRACT INFECTION WITHOUT HEMATURIA, SITE UNSPECIFIED: ICD-10-CM

## 2024-01-04 DIAGNOSIS — N39.0 URINARY TRACT INFECTION WITHOUT HEMATURIA, SITE UNSPECIFIED: Primary | ICD-10-CM

## 2024-01-04 LAB
APPEARANCE UR: ABNORMAL
BILIRUB UR STRIP.AUTO-MCNC: ABNORMAL MG/DL
COLOR UR: YELLOW
GLUCOSE UR STRIP.AUTO-MCNC: NEGATIVE MG/DL
HYALINE CASTS #/AREA URNS AUTO: ABNORMAL /LPF
KETONES UR STRIP.AUTO-MCNC: NEGATIVE MG/DL
LEUKOCYTE ESTERASE UR QL STRIP.AUTO: ABNORMAL
NITRITE UR QL STRIP.AUTO: NEGATIVE
PH UR STRIP.AUTO: 5 [PH]
PROT UR STRIP.AUTO-MCNC: NEGATIVE MG/DL
RBC # UR STRIP.AUTO: NEGATIVE /UL
RBC #/AREA URNS AUTO: ABNORMAL /HPF
SP GR UR STRIP.AUTO: 1.03
SQUAMOUS #/AREA URNS AUTO: ABNORMAL /HPF
TRANS CELLS #/AREA UR COMP ASSIST: ABNORMAL /HPF
UROBILINOGEN UR STRIP.AUTO-MCNC: <2 MG/DL
WBC #/AREA URNS AUTO: ABNORMAL /HPF

## 2024-01-04 PROCEDURE — 81001 URINALYSIS AUTO W/SCOPE: CPT

## 2024-01-04 RX ORDER — CIPROFLOXACIN 500 MG/1
500 TABLET ORAL 2 TIMES DAILY
Qty: 10 TABLET | Refills: 0 | Status: SHIPPED | OUTPATIENT
Start: 2024-01-04 | End: 2024-01-09

## 2024-01-05 ENCOUNTER — APPOINTMENT (OUTPATIENT)
Dept: PRIMARY CARE | Facility: CLINIC | Age: 84
End: 2024-01-05
Payer: MEDICARE

## 2024-01-06 DIAGNOSIS — K21.9 GASTROESOPHAGEAL REFLUX DISEASE WITHOUT ESOPHAGITIS: Primary | ICD-10-CM

## 2024-01-06 DIAGNOSIS — F41.9 ANXIETY: ICD-10-CM

## 2024-01-08 RX ORDER — ONDANSETRON 4 MG/1
4 TABLET, FILM COATED ORAL EVERY 6 HOURS
Qty: 90 TABLET | Refills: 0 | Status: SHIPPED | OUTPATIENT
Start: 2024-01-08

## 2024-01-08 RX ORDER — LORAZEPAM 1 MG/1
TABLET ORAL
Qty: 30 TABLET | Refills: 0 | Status: SHIPPED | OUTPATIENT
Start: 2024-01-08 | End: 2024-02-27

## 2024-01-15 ENCOUNTER — TELEPHONE (OUTPATIENT)
Dept: PRIMARY CARE | Facility: CLINIC | Age: 84
End: 2024-01-15
Payer: MEDICARE

## 2024-01-20 DIAGNOSIS — M19.90 ARTHRITIS: ICD-10-CM

## 2024-01-22 RX ORDER — MELOXICAM 15 MG/1
TABLET ORAL
Qty: 90 TABLET | Refills: 0 | Status: SHIPPED | OUTPATIENT
Start: 2024-01-22 | End: 2024-04-16

## 2024-01-27 DIAGNOSIS — I10 PRIMARY HYPERTENSION: ICD-10-CM

## 2024-01-29 RX ORDER — LISINOPRIL 20 MG/1
TABLET ORAL
Qty: 30 TABLET | Refills: 0 | Status: SHIPPED | OUTPATIENT
Start: 2024-01-29 | End: 2024-02-23

## 2024-02-09 DIAGNOSIS — F32.A DEPRESSION, UNSPECIFIED DEPRESSION TYPE: ICD-10-CM

## 2024-02-09 RX ORDER — BUPROPION HYDROCHLORIDE 150 MG/1
TABLET ORAL
Qty: 90 TABLET | Refills: 0 | Status: SHIPPED | OUTPATIENT
Start: 2024-02-09 | End: 2024-05-07

## 2024-02-23 DIAGNOSIS — I10 PRIMARY HYPERTENSION: ICD-10-CM

## 2024-02-23 RX ORDER — LISINOPRIL 20 MG/1
TABLET ORAL
Qty: 30 TABLET | Refills: 0 | Status: SHIPPED | OUTPATIENT
Start: 2024-02-23 | End: 2024-03-25

## 2024-02-27 DIAGNOSIS — F41.9 ANXIETY: ICD-10-CM

## 2024-02-27 RX ORDER — LORAZEPAM 1 MG/1
TABLET ORAL
Qty: 30 TABLET | Refills: 0 | Status: SHIPPED | OUTPATIENT
Start: 2024-02-27

## 2024-03-12 NOTE — PROGRESS NOTES
She is having left eye drainage and discomfort now for 6 months. For the last 4 months she has been having significant clear rhinorrhea when she bends forwards. No sore throat or ear pain.      Grief, poor short term memory: She is following with Shelley interiano. Doing better lately. She is currently on lexapro and has ativan for prn use which she is using sparingly. She does not plan on getting any further refills of the ativan at this point.     DM: Diet controlled. Completed recent labs.      Hemorrhagic stroke: Dizziness is chronic/unchanged.     HTN: On labetalol and lisinopril, no SE's.     GERD: Doing well off omeprazole.      HLD: Back on atorvastatin, no SE's.     Osteoporosis, osteoarthritis: Taking meloxicam.     All other systems have been reviewed and are negative for complaint         83yo female here for followup:     #HTN  Controlled on lasix, labetalol, and furosemide     #Poor short term memory  MOCA 21/30 (8/23/22)  discussed support systems that could be helpful     #Depression:  #Insomnia  on lexapro and wellbutrin  Stopping lorazepam prn     #Hemorrhagic stroke   has residual symptoms for previous stroke (weakness, dizziness)  On statin, BP is controlled, recently completed at home PT  Refill meclizine for dizziness     #Renal cysts  seen on CT/MRI by nephro- no need for intervention.  Monitored by imaging through nephro (Dr. Briscoe)     #GERD  controlled on PPI     #HLD  stopped her statin  rx sent lower dose atorvastatin     #DM  diet controlled, monitoring a1c     #L shoulder pain  cervical spondylosis- on meloxicam     #Cervical facet syndrome  not currently seeing pain management     #Osteoporosis  on bisphosphonates, will continue management     #Vit D def  rx sent 2000u dailty     #Renal insufficiency  Baseline, monitoring     HCM:  UTD for flu, COVID vaccine  Past the age for screening, will go based on symptoms

## 2024-03-13 ENCOUNTER — LAB (OUTPATIENT)
Dept: LAB | Facility: LAB | Age: 84
End: 2024-03-13
Payer: MEDICARE

## 2024-03-13 DIAGNOSIS — R73.03 PREDIABETES: ICD-10-CM

## 2024-03-13 DIAGNOSIS — R73.09 ELEVATED GLUCOSE: ICD-10-CM

## 2024-03-13 DIAGNOSIS — E78.5 HYPERLIPIDEMIA, UNSPECIFIED HYPERLIPIDEMIA TYPE: ICD-10-CM

## 2024-03-13 LAB
ALBUMIN SERPL BCP-MCNC: 4.5 G/DL (ref 3.4–5)
ALP SERPL-CCNC: 53 U/L (ref 33–136)
ALT SERPL W P-5'-P-CCNC: 28 U/L (ref 7–45)
ANION GAP SERPL CALC-SCNC: 13 MMOL/L (ref 10–20)
AST SERPL W P-5'-P-CCNC: 27 U/L (ref 9–39)
BASOPHILS # BLD AUTO: 0.01 X10*3/UL (ref 0–0.1)
BASOPHILS NFR BLD AUTO: 0.3 %
BILIRUB SERPL-MCNC: 0.5 MG/DL (ref 0–1.2)
BUN SERPL-MCNC: 30 MG/DL (ref 6–23)
CALCIUM SERPL-MCNC: 10.5 MG/DL (ref 8.6–10.3)
CHLORIDE SERPL-SCNC: 101 MMOL/L (ref 98–107)
CHOLEST SERPL-MCNC: 142 MG/DL (ref 0–199)
CHOLESTEROL/HDL RATIO: 2.5
CO2 SERPL-SCNC: 31 MMOL/L (ref 21–32)
CREAT SERPL-MCNC: 1.57 MG/DL (ref 0.5–1.05)
EGFRCR SERPLBLD CKD-EPI 2021: 33 ML/MIN/1.73M*2
EOSINOPHIL # BLD AUTO: 0.08 X10*3/UL (ref 0–0.4)
EOSINOPHIL NFR BLD AUTO: 2.4 %
ERYTHROCYTE [DISTWIDTH] IN BLOOD BY AUTOMATED COUNT: 11.9 % (ref 11.5–14.5)
EST. AVERAGE GLUCOSE BLD GHB EST-MCNC: 114 MG/DL
GLUCOSE SERPL-MCNC: 107 MG/DL (ref 74–99)
HBA1C MFR BLD: 5.6 %
HCT VFR BLD AUTO: 40.9 % (ref 36–46)
HDLC SERPL-MCNC: 57.6 MG/DL
HGB BLD-MCNC: 13.4 G/DL (ref 12–16)
IMM GRANULOCYTES # BLD AUTO: 0.01 X10*3/UL (ref 0–0.5)
IMM GRANULOCYTES NFR BLD AUTO: 0.3 % (ref 0–0.9)
LDLC SERPL CALC-MCNC: 64 MG/DL
LYMPHOCYTES # BLD AUTO: 0.93 X10*3/UL (ref 0.8–3)
LYMPHOCYTES NFR BLD AUTO: 27.4 %
MCH RBC QN AUTO: 32 PG (ref 26–34)
MCHC RBC AUTO-ENTMCNC: 32.8 G/DL (ref 32–36)
MCV RBC AUTO: 98 FL (ref 80–100)
MONOCYTES # BLD AUTO: 0.36 X10*3/UL (ref 0.05–0.8)
MONOCYTES NFR BLD AUTO: 10.6 %
NEUTROPHILS # BLD AUTO: 2.01 X10*3/UL (ref 1.6–5.5)
NEUTROPHILS NFR BLD AUTO: 59 %
NON HDL CHOLESTEROL: 84 MG/DL (ref 0–149)
NRBC BLD-RTO: 0 /100 WBCS (ref 0–0)
PLATELET # BLD AUTO: 148 X10*3/UL (ref 150–450)
POTASSIUM SERPL-SCNC: 4.6 MMOL/L (ref 3.5–5.3)
PROT SERPL-MCNC: 7 G/DL (ref 6.4–8.2)
RBC # BLD AUTO: 4.19 X10*6/UL (ref 4–5.2)
SODIUM SERPL-SCNC: 140 MMOL/L (ref 136–145)
TRIGL SERPL-MCNC: 102 MG/DL (ref 0–149)
VLDL: 20 MG/DL (ref 0–40)
WBC # BLD AUTO: 3.4 X10*3/UL (ref 4.4–11.3)

## 2024-03-13 PROCEDURE — 80053 COMPREHEN METABOLIC PANEL: CPT

## 2024-03-13 PROCEDURE — 80061 LIPID PANEL: CPT

## 2024-03-13 PROCEDURE — 83036 HEMOGLOBIN GLYCOSYLATED A1C: CPT

## 2024-03-13 PROCEDURE — 85025 COMPLETE CBC W/AUTO DIFF WBC: CPT

## 2024-03-13 PROCEDURE — 36415 COLL VENOUS BLD VENIPUNCTURE: CPT

## 2024-03-19 ENCOUNTER — APPOINTMENT (OUTPATIENT)
Dept: PRIMARY CARE | Facility: CLINIC | Age: 84
End: 2024-03-19
Payer: MEDICARE

## 2024-03-21 DIAGNOSIS — I10 PRIMARY HYPERTENSION: ICD-10-CM

## 2024-03-21 DIAGNOSIS — F41.9 ANXIETY: ICD-10-CM

## 2024-03-22 RX ORDER — ESCITALOPRAM OXALATE 20 MG/1
TABLET ORAL
Qty: 30 TABLET | Refills: 0 | Status: SHIPPED | OUTPATIENT
Start: 2024-03-22 | End: 2024-04-16

## 2024-03-22 RX ORDER — FUROSEMIDE 20 MG/1
20 TABLET ORAL DAILY
Qty: 30 TABLET | Refills: 0 | Status: SHIPPED | OUTPATIENT
Start: 2024-03-22 | End: 2024-04-16

## 2024-03-24 DIAGNOSIS — I10 PRIMARY HYPERTENSION: ICD-10-CM

## 2024-03-25 RX ORDER — LISINOPRIL 20 MG/1
TABLET ORAL
Qty: 30 TABLET | Refills: 0 | Status: SHIPPED | OUTPATIENT
Start: 2024-03-25 | End: 2024-04-23

## 2024-04-16 DIAGNOSIS — F41.9 ANXIETY: ICD-10-CM

## 2024-04-16 DIAGNOSIS — M19.90 ARTHRITIS: ICD-10-CM

## 2024-04-16 DIAGNOSIS — I10 PRIMARY HYPERTENSION: ICD-10-CM

## 2024-04-16 RX ORDER — MELOXICAM 15 MG/1
TABLET ORAL
Qty: 90 TABLET | Refills: 0 | Status: SHIPPED | OUTPATIENT
Start: 2024-04-16

## 2024-04-16 RX ORDER — FUROSEMIDE 20 MG/1
20 TABLET ORAL DAILY
Qty: 30 TABLET | Refills: 0 | Status: SHIPPED | OUTPATIENT
Start: 2024-04-16 | End: 2024-05-31

## 2024-04-16 RX ORDER — ESCITALOPRAM OXALATE 20 MG/1
TABLET ORAL
Qty: 30 TABLET | Refills: 0 | Status: SHIPPED | OUTPATIENT
Start: 2024-04-16

## 2024-04-23 DIAGNOSIS — I10 PRIMARY HYPERTENSION: ICD-10-CM

## 2024-04-23 RX ORDER — LISINOPRIL 20 MG/1
TABLET ORAL
Qty: 30 TABLET | Refills: 0 | Status: SHIPPED | OUTPATIENT
Start: 2024-04-23

## 2024-05-06 DIAGNOSIS — F32.A DEPRESSION, UNSPECIFIED DEPRESSION TYPE: ICD-10-CM

## 2024-05-07 RX ORDER — BUPROPION HYDROCHLORIDE 150 MG/1
TABLET ORAL
Qty: 90 TABLET | Refills: 0 | Status: SHIPPED | OUTPATIENT
Start: 2024-05-07

## 2024-05-30 ENCOUNTER — TELEPHONE (OUTPATIENT)
Dept: PHARMACY | Facility: HOSPITAL | Age: 84
End: 2024-05-30
Payer: MEDICARE

## 2024-05-30 NOTE — TELEPHONE ENCOUNTER
Population Health: Outreach by Ambulatory Pharmacy Team    Payor: [employer]  Reason: Adherence  Medication: Lisinopril 20mg  Outcome: Left Voicemail    *Returned call later-just picked up Lisinopril from [facility]. Tolerating well, no problems    Lesly Aguero [facility]

## 2024-05-31 DIAGNOSIS — I10 PRIMARY HYPERTENSION: ICD-10-CM

## 2024-05-31 RX ORDER — FUROSEMIDE 20 MG/1
20 TABLET ORAL DAILY
Qty: 30 TABLET | Refills: 0 | Status: SHIPPED | OUTPATIENT
Start: 2024-05-31

## 2024-06-15 DIAGNOSIS — F41.9 ANXIETY: ICD-10-CM

## 2024-06-17 DIAGNOSIS — E78.5 HYPERLIPIDEMIA, UNSPECIFIED HYPERLIPIDEMIA TYPE: ICD-10-CM

## 2024-06-17 DIAGNOSIS — I10 PRIMARY HYPERTENSION: ICD-10-CM

## 2024-06-17 RX ORDER — ESCITALOPRAM OXALATE 20 MG/1
TABLET ORAL
Qty: 30 TABLET | Refills: 0 | Status: SHIPPED | OUTPATIENT
Start: 2024-06-17

## 2024-06-17 RX ORDER — LISINOPRIL 20 MG/1
TABLET ORAL
Qty: 30 TABLET | Refills: 0 | Status: SHIPPED | OUTPATIENT
Start: 2024-06-17

## 2024-06-17 RX ORDER — ATORVASTATIN CALCIUM 20 MG/1
TABLET, FILM COATED ORAL
Qty: 100 TABLET | Refills: 0 | Status: SHIPPED | OUTPATIENT
Start: 2024-06-17

## 2024-07-08 DIAGNOSIS — I10 PRIMARY HYPERTENSION: ICD-10-CM

## 2024-07-08 RX ORDER — LISINOPRIL 20 MG/1
20 TABLET ORAL DAILY
Qty: 100 TABLET | Refills: 0 | Status: SHIPPED | OUTPATIENT
Start: 2024-07-08

## 2024-07-09 DIAGNOSIS — I10 PRIMARY HYPERTENSION: ICD-10-CM

## 2024-07-09 RX ORDER — LABETALOL 100 MG/1
TABLET, FILM COATED ORAL
Qty: 180 TABLET | Refills: 0 | Status: SHIPPED | OUTPATIENT
Start: 2024-07-09

## 2024-07-13 DIAGNOSIS — M19.90 ARTHRITIS: ICD-10-CM

## 2024-07-13 DIAGNOSIS — F41.9 ANXIETY: ICD-10-CM

## 2024-07-15 RX ORDER — ESCITALOPRAM OXALATE 20 MG/1
TABLET ORAL
Qty: 30 TABLET | Refills: 0 | Status: SHIPPED | OUTPATIENT
Start: 2024-07-15

## 2024-07-15 RX ORDER — MELOXICAM 15 MG/1
TABLET ORAL
Qty: 90 TABLET | Refills: 0 | Status: SHIPPED | OUTPATIENT
Start: 2024-07-15

## 2024-07-17 DIAGNOSIS — F41.9 ANXIETY: ICD-10-CM

## 2024-07-17 RX ORDER — LORAZEPAM 1 MG/1
TABLET ORAL
Qty: 30 TABLET | Refills: 0 | Status: SHIPPED | OUTPATIENT
Start: 2024-07-17

## 2024-07-24 DIAGNOSIS — I10 PRIMARY HYPERTENSION: ICD-10-CM

## 2024-07-24 RX ORDER — FUROSEMIDE 20 MG/1
20 TABLET ORAL DAILY
Qty: 30 TABLET | Refills: 0 | Status: SHIPPED | OUTPATIENT
Start: 2024-07-24

## 2024-08-03 DIAGNOSIS — F32.A DEPRESSION, UNSPECIFIED DEPRESSION TYPE: ICD-10-CM

## 2024-08-05 RX ORDER — BUPROPION HYDROCHLORIDE 150 MG/1
TABLET ORAL
Qty: 90 TABLET | Refills: 0 | Status: SHIPPED | OUTPATIENT
Start: 2024-08-05

## 2024-08-06 NOTE — PROGRESS NOTES
"Subjective   Patient ID: Karina Lee is a 83 y.o. female who presents for Medicare Annual Wellness Visit Subsequent.     Tremor: She hs been getting worsening tremor. It is worse in the morning. She is dealing with more arm and leg weakness. She is having a hard time getting out of bed in the morning. Not sleeping well at night.     Grief, poor short term memory: She is following with Shelley interiano. Doing better lately. She is currently on lexapro and has ativan for prn use which she is using sparingly. She does not plan on getting any further refills of the ativan at this point. She is taking 1/2 ativan at night. She sleeps rather well when she does take the ativan. She only takes it at night about 2 hours before she goes     DM: Diet controlled. Completed recent labs.      Hemorrhagic stroke: Dizziness is chronic/unchanged.     HTN: On labetalol and lisinopril, no SE's.     HLD: Back on atorvastatin, no SE's.     Osteoporosis, osteoarthritis: Taking meloxicam.     All other systems have been reviewed and are negative for complaint     Objective   /78 (BP Location: Left arm, Patient Position: Sitting, BP Cuff Size: Large adult)   Pulse 72   Ht 1.676 m (5' 6\")   Wt 94.3 kg (208 lb)   BMI 33.57 kg/m²     Gen: No acute distress, alert and oriented x3, pleasant   HEENT: moist mucous membranes, b/l external auditory canals are clear of debris, TMs within normal limits, no oropharyngeal lesions, eomi, perrla   Neck: thyroid within normal limits, no lymphadenopathy   CV: RRR, normal S1/S2, no murmur   Resp: Clear to auscultation bilaterally, no wheezes or rhonchi appreciated  Abd: soft, nontender, non-distended, no guarding/rigidity, bowel sounds present  Extr: no edema, no calf tenderness  Derm: Skin is warm and dry, no rashes appreciated  Psych: mood is good, affect is congruent, good hygiene, normal speech and eye contact  Neuro: cranial nerves grossly intact, intention tremor noted, no micrographia, " she os wheelchair bound    Assessment/Plan     #CKD:  Stop lasix  Cut back on meloxicam  Recheck in 4 mo    #HTN  Stop lasix  Switch labetalol for inderal  She is on lisinopril  Planning to add on      #Poor short term memory  MOCA 21/30 (8/23/22)  discussed support systems that could be helpful     #Depression:  #Insomnia  on lexapro and wellbutrin  She is taking 1/2 tab ativan at bedtime   CSA signed Aug 2024  Ordering UDS     #Hemorrhagic stroke   has residual symptoms for previous stroke (weakness, dizziness)  On statin, BP is controlled, recently completed at home PT  Refill meclizine for dizziness     #Renal cysts  seen on CT/MRI by nephro- no need for intervention.  Monitored by imaging through nephro (Dr. Briscoe)     #HLD  Controlled on atorvastatin     #DM  diet controlled, monitoring a1c     #L shoulder pain  cervical spondylosis- on meloxicam     #Cervical facet syndrome  not currently seeing pain management     #Osteoporosis  on bisphosphonates, will continue management     HCM:  UTD for flu, COVID vaccine  Past the age for screening, will go based on symptoms

## 2024-08-09 DIAGNOSIS — F41.9 ANXIETY: ICD-10-CM

## 2024-08-09 RX ORDER — ESCITALOPRAM OXALATE 20 MG/1
TABLET ORAL
Qty: 30 TABLET | Refills: 0 | Status: SHIPPED | OUTPATIENT
Start: 2024-08-09

## 2024-08-13 ENCOUNTER — APPOINTMENT (OUTPATIENT)
Dept: PRIMARY CARE | Facility: CLINIC | Age: 84
End: 2024-08-13
Payer: MEDICARE

## 2024-08-13 VITALS
WEIGHT: 208 LBS | BODY MASS INDEX: 33.43 KG/M2 | DIASTOLIC BLOOD PRESSURE: 78 MMHG | HEIGHT: 66 IN | HEART RATE: 72 BPM | SYSTOLIC BLOOD PRESSURE: 151 MMHG

## 2024-08-13 DIAGNOSIS — F33.1 MODERATE EPISODE OF RECURRENT MAJOR DEPRESSIVE DISORDER (MULTI): ICD-10-CM

## 2024-08-13 DIAGNOSIS — F51.01 PRIMARY INSOMNIA: ICD-10-CM

## 2024-08-13 DIAGNOSIS — I11.0 HYPERTENSIVE HEART DISEASE WITH HEART FAILURE (MULTI): ICD-10-CM

## 2024-08-13 DIAGNOSIS — E11.9 TYPE 2 DIABETES MELLITUS WITHOUT COMPLICATION, WITHOUT LONG-TERM CURRENT USE OF INSULIN (MULTI): ICD-10-CM

## 2024-08-13 DIAGNOSIS — N18.31 STAGE 3A CHRONIC KIDNEY DISEASE (MULTI): ICD-10-CM

## 2024-08-13 DIAGNOSIS — R25.1 TREMOR: ICD-10-CM

## 2024-08-13 DIAGNOSIS — Z79.899 MEDICATION MANAGEMENT: ICD-10-CM

## 2024-08-13 DIAGNOSIS — I69.351 HEMIPLGA FOLLOWING CEREBRAL INFRC AFF RIGHT DOMINANT SIDE (MULTI): ICD-10-CM

## 2024-08-13 DIAGNOSIS — R73.03 PREDIABETES: Primary | ICD-10-CM

## 2024-08-13 DIAGNOSIS — D69.6 ACQUIRED THROMBOCYTOPENIA (CMS-HCC): ICD-10-CM

## 2024-08-13 PROCEDURE — 3078F DIAST BP <80 MM HG: CPT | Performed by: FAMILY MEDICINE

## 2024-08-13 PROCEDURE — 1158F ADVNC CARE PLAN TLK DOCD: CPT | Performed by: FAMILY MEDICINE

## 2024-08-13 PROCEDURE — 1159F MED LIST DOCD IN RCRD: CPT | Performed by: FAMILY MEDICINE

## 2024-08-13 PROCEDURE — 1036F TOBACCO NON-USER: CPT | Performed by: FAMILY MEDICINE

## 2024-08-13 PROCEDURE — 3077F SYST BP >= 140 MM HG: CPT | Performed by: FAMILY MEDICINE

## 2024-08-13 PROCEDURE — 1170F FXNL STATUS ASSESSED: CPT | Performed by: FAMILY MEDICINE

## 2024-08-13 PROCEDURE — 1157F ADVNC CARE PLAN IN RCRD: CPT | Performed by: FAMILY MEDICINE

## 2024-08-13 PROCEDURE — G0439 PPPS, SUBSEQ VISIT: HCPCS | Performed by: FAMILY MEDICINE

## 2024-08-13 PROCEDURE — 1123F ACP DISCUSS/DSCN MKR DOCD: CPT | Performed by: FAMILY MEDICINE

## 2024-08-13 RX ORDER — PROPRANOLOL HYDROCHLORIDE 120 MG/1
120 CAPSULE, EXTENDED RELEASE ORAL DAILY
Qty: 30 CAPSULE | Refills: 11 | Status: SHIPPED | OUTPATIENT
Start: 2024-08-13 | End: 2025-08-13

## 2024-08-13 ASSESSMENT — PATIENT HEALTH QUESTIONNAIRE - PHQ9
10. IF YOU CHECKED OFF ANY PROBLEMS, HOW DIFFICULT HAVE THESE PROBLEMS MADE IT FOR YOU TO DO YOUR WORK, TAKE CARE OF THINGS AT HOME, OR GET ALONG WITH OTHER PEOPLE: SOMEWHAT DIFFICULT
2. FEELING DOWN, DEPRESSED OR HOPELESS: NEARLY EVERY DAY
1. LITTLE INTEREST OR PLEASURE IN DOING THINGS: NEARLY EVERY DAY
SUM OF ALL RESPONSES TO PHQ9 QUESTIONS 1 AND 2: 6

## 2024-08-13 ASSESSMENT — ACTIVITIES OF DAILY LIVING (ADL)
MANAGING_FINANCES: INDEPENDENT
GROCERY_SHOPPING: TOTAL CARE
DOING_HOUSEWORK: NEEDS ASSISTANCE
DRESSING: INDEPENDENT
TAKING_MEDICATION: INDEPENDENT
BATHING: INDEPENDENT

## 2024-08-13 NOTE — PATIENT INSTRUCTIONS
Pleas stop lasix (furosemide) and switch to taking it as needed  Please stop meloxicam and see how your pain level is    Stop labetalol and start Inderal once a day for tremor and blood pressure

## 2024-09-08 DIAGNOSIS — I10 PRIMARY HYPERTENSION: ICD-10-CM

## 2024-09-09 RX ORDER — FUROSEMIDE 20 MG/1
20 TABLET ORAL DAILY
Qty: 30 TABLET | Refills: 0 | Status: SHIPPED | OUTPATIENT
Start: 2024-09-09

## 2024-10-02 DIAGNOSIS — E78.5 HYPERLIPIDEMIA, UNSPECIFIED HYPERLIPIDEMIA TYPE: ICD-10-CM

## 2024-10-02 RX ORDER — ATORVASTATIN CALCIUM 20 MG/1
TABLET, FILM COATED ORAL
Qty: 100 TABLET | Refills: 0 | Status: SHIPPED | OUTPATIENT
Start: 2024-10-02

## 2024-10-05 DIAGNOSIS — I10 PRIMARY HYPERTENSION: ICD-10-CM

## 2024-10-07 DIAGNOSIS — F41.9 ANXIETY: ICD-10-CM

## 2024-10-07 RX ORDER — ESCITALOPRAM OXALATE 20 MG/1
TABLET ORAL
Qty: 30 TABLET | Refills: 0 | Status: SHIPPED | OUTPATIENT
Start: 2024-10-07

## 2024-10-07 RX ORDER — FUROSEMIDE 20 MG/1
20 TABLET ORAL DAILY
Qty: 30 TABLET | Refills: 0 | Status: SHIPPED | OUTPATIENT
Start: 2024-10-07

## 2024-10-08 DIAGNOSIS — M19.90 ARTHRITIS: ICD-10-CM

## 2024-10-08 RX ORDER — MELOXICAM 15 MG/1
TABLET ORAL
Qty: 90 TABLET | Refills: 0 | Status: SHIPPED | OUTPATIENT
Start: 2024-10-08

## 2024-10-29 ENCOUNTER — OFFICE VISIT (OUTPATIENT)
Dept: PRIMARY CARE | Facility: CLINIC | Age: 84
End: 2024-10-29
Payer: MEDICARE

## 2024-10-29 VITALS — SYSTOLIC BLOOD PRESSURE: 148 MMHG | DIASTOLIC BLOOD PRESSURE: 85 MMHG | HEART RATE: 80 BPM | TEMPERATURE: 96.4 F

## 2024-10-29 DIAGNOSIS — K59.00 CONSTIPATION, UNSPECIFIED CONSTIPATION TYPE: ICD-10-CM

## 2024-10-29 DIAGNOSIS — R19.7 DIARRHEA, UNSPECIFIED TYPE: Primary | ICD-10-CM

## 2024-10-29 DIAGNOSIS — R11.0 NAUSEA: ICD-10-CM

## 2024-10-29 PROCEDURE — 3077F SYST BP >= 140 MM HG: CPT

## 2024-10-29 PROCEDURE — 1157F ADVNC CARE PLAN IN RCRD: CPT

## 2024-10-29 PROCEDURE — 3079F DIAST BP 80-89 MM HG: CPT

## 2024-10-29 PROCEDURE — 1123F ACP DISCUSS/DSCN MKR DOCD: CPT

## 2024-10-29 PROCEDURE — 99213 OFFICE O/P EST LOW 20 MIN: CPT

## 2024-10-29 PROCEDURE — 1159F MED LIST DOCD IN RCRD: CPT

## 2024-10-29 PROCEDURE — 1036F TOBACCO NON-USER: CPT

## 2024-10-29 RX ORDER — LOPERAMIDE HCL 2 MG
2 TABLET ORAL 4 TIMES DAILY PRN
Qty: 30 TABLET | Refills: 0 | Status: SHIPPED | OUTPATIENT
Start: 2024-10-29 | End: 2024-11-08

## 2024-10-29 RX ORDER — PSYLLIUM HUSK 3.4 G/5.8G
3.4 POWDER ORAL DAILY
Qty: 283 G | Refills: 3 | Status: SHIPPED | OUTPATIENT
Start: 2024-10-29

## 2024-11-03 DIAGNOSIS — F41.9 ANXIETY: ICD-10-CM

## 2024-11-03 DIAGNOSIS — I10 PRIMARY HYPERTENSION: ICD-10-CM

## 2024-11-04 RX ORDER — FUROSEMIDE 20 MG/1
20 TABLET ORAL DAILY
Qty: 30 TABLET | Refills: 0 | Status: SHIPPED | OUTPATIENT
Start: 2024-11-04

## 2024-11-04 RX ORDER — ESCITALOPRAM OXALATE 20 MG/1
TABLET ORAL
Qty: 30 TABLET | Refills: 0 | Status: SHIPPED | OUTPATIENT
Start: 2024-11-04

## 2024-11-09 DIAGNOSIS — I10 PRIMARY HYPERTENSION: ICD-10-CM

## 2024-11-11 RX ORDER — LISINOPRIL 20 MG/1
20 TABLET ORAL DAILY
Qty: 30 TABLET | Refills: 0 | Status: SHIPPED | OUTPATIENT
Start: 2024-11-11

## 2024-12-06 DIAGNOSIS — I10 PRIMARY HYPERTENSION: ICD-10-CM

## 2024-12-06 DIAGNOSIS — F41.9 ANXIETY: ICD-10-CM

## 2024-12-06 RX ORDER — ESCITALOPRAM OXALATE 20 MG/1
TABLET ORAL
Qty: 30 TABLET | Refills: 0 | Status: SHIPPED | OUTPATIENT
Start: 2024-12-06

## 2024-12-06 RX ORDER — FUROSEMIDE 20 MG/1
20 TABLET ORAL DAILY
Qty: 30 TABLET | Refills: 0 | Status: SHIPPED | OUTPATIENT
Start: 2024-12-06

## 2024-12-06 RX ORDER — LISINOPRIL 20 MG/1
20 TABLET ORAL DAILY
Qty: 30 TABLET | Refills: 0 | Status: SHIPPED | OUTPATIENT
Start: 2024-12-06

## 2024-12-08 ENCOUNTER — HOSPITAL ENCOUNTER (EMERGENCY)
Facility: HOSPITAL | Age: 84
Discharge: HOME | End: 2024-12-08
Attending: FAMILY MEDICINE
Payer: MEDICARE

## 2024-12-08 VITALS
RESPIRATION RATE: 17 BRPM | TEMPERATURE: 96.7 F | SYSTOLIC BLOOD PRESSURE: 175 MMHG | OXYGEN SATURATION: 100 % | BODY MASS INDEX: 32.14 KG/M2 | HEART RATE: 67 BPM | HEIGHT: 66 IN | DIASTOLIC BLOOD PRESSURE: 94 MMHG | WEIGHT: 200 LBS

## 2024-12-08 DIAGNOSIS — H00.014 HORDEOLUM EXTERNUM LEFT UPPER EYELID: ICD-10-CM

## 2024-12-08 DIAGNOSIS — H00.012 HORDEOLUM EXTERNUM OF RIGHT LOWER EYELID: Primary | ICD-10-CM

## 2024-12-08 PROCEDURE — 99281 EMR DPT VST MAYX REQ PHY/QHP: CPT | Performed by: FAMILY MEDICINE

## 2024-12-08 PROCEDURE — 99283 EMERGENCY DEPT VISIT LOW MDM: CPT

## 2024-12-08 RX ORDER — DOXYCYCLINE 100 MG/1
100 TABLET ORAL 2 TIMES DAILY
Qty: 20 TABLET | Refills: 0 | Status: SHIPPED | OUTPATIENT
Start: 2024-12-08 | End: 2024-12-18

## 2024-12-08 ASSESSMENT — PAIN SCALES - GENERAL
PAINLEVEL_OUTOF10: 10 - WORST POSSIBLE PAIN
PAINLEVEL_OUTOF10: 10 - WORST POSSIBLE PAIN
PAINLEVEL_OUTOF10: 0 - NO PAIN

## 2024-12-08 ASSESSMENT — COLUMBIA-SUICIDE SEVERITY RATING SCALE - C-SSRS
1. IN THE PAST MONTH, HAVE YOU WISHED YOU WERE DEAD OR WISHED YOU COULD GO TO SLEEP AND NOT WAKE UP?: NO
6. HAVE YOU EVER DONE ANYTHING, STARTED TO DO ANYTHING, OR PREPARED TO DO ANYTHING TO END YOUR LIFE?: NO
2. HAVE YOU ACTUALLY HAD ANY THOUGHTS OF KILLING YOURSELF?: NO

## 2024-12-08 ASSESSMENT — PAIN DESCRIPTION - ORIENTATION: ORIENTATION: RIGHT;LEFT

## 2024-12-08 ASSESSMENT — PAIN DESCRIPTION - LOCATION: LOCATION: EYE

## 2024-12-08 ASSESSMENT — PAIN - FUNCTIONAL ASSESSMENT
PAIN_FUNCTIONAL_ASSESSMENT: 0-10
PAIN_FUNCTIONAL_ASSESSMENT: 0-10

## 2024-12-08 ASSESSMENT — PAIN DESCRIPTION - PROGRESSION
CLINICAL_PROGRESSION: NOT CHANGED
CLINICAL_PROGRESSION: RESOLVED

## 2024-12-08 ASSESSMENT — PAIN DESCRIPTION - PAIN TYPE: TYPE: ACUTE PAIN

## 2024-12-08 NOTE — ED PROVIDER NOTES
HPI   Chief Complaint   Patient presents with    Eye Problem       HPI  This is a 83-year-old female patient came to the emergency room accompanied by family with a complaint of bilateral eyelid infection, she has not really tried infection and infected gland in the left upper eyelid and tiny 1 in the right lower eyelid symptoms started few days ago.  She denies any vision trouble she denies any contact lenses denies cataract glaucoma chronic eye problem eye surgery.  She denies diabetes.  Denies any chest pain fever chills cough nausea vomiting diarrhea headache or neck stiffness.      Patient History   Past Medical History:   Diagnosis Date    Acute upper respiratory infection, unspecified 01/07/2015    Acute URI    Age-related osteoporosis without current pathological fracture     Osteoporosis    Anxiety disorder, unspecified     Anxiety    Cerebral infarction, unspecified 01/25/2018    CVA (cerebral vascular accident)    Dermatitis due to ingested food 03/03/2015    Allergic dermatitis due ingested food    Dizziness and giddiness 01/25/2018    Dizziness, nonspecific    Fatty (change of) liver, not elsewhere classified     Nonalcoholic fatty liver disease    Nutritional intake less than body requirements 01/03/2024    Other conditions influencing health status 01/25/2018    Asymmetrical hearing loss of both ears    Pain 01/03/2024    Pain in unspecified hip 01/25/2018    Hip pain    Personal history of other diseases of the circulatory system 01/19/2015    History of hypertension    Personal history of other diseases of the digestive system 01/25/2018    History of gastroesophageal reflux (GERD)    Personal history of other diseases of the musculoskeletal system and connective tissue     Personal history of arthritis    Personal history of other diseases of the nervous system and sense organs 02/04/2015    History of acute otitis externa    Personal history of other diseases of urinary system 03/05/2014     History of renal insufficiency syndrome    Personal history of other endocrine, nutritional and metabolic disease 01/25/2018    History of hyperlipidemia    Personal history of other specified conditions 01/19/2015    History of headache    Personal history of other specified conditions 06/12/2019    History of diarrhea    Personal history of transient ischemic attack (TIA), and cerebral infarction without residual deficits 10/23/2017    History of cerebrovascular accident    Unspecified hearing loss, unspecified ear 01/25/2018    Hearing difficulty     Past Surgical History:   Procedure Laterality Date    ADENOIDECTOMY  01/19/2015    Adenoidectomy    BACK SURGERY  11/26/2013    Back Surgery    CHOLECYSTECTOMY  11/26/2013    Cholecystectomy    CT ANGIO NECK  6/3/2017    CT NECK ANGIO W AND WO IV CONTRAST 6/3/2017 [facility]    CT HEAD ANGIO W AND WO IV CONTRAST  6/3/2017    CT HEAD ANGIO W AND WO IV CONTRAST 6/3/2017 [facility] CLINICAL LEGAC[facility]    KNEE SURGERY  11/26/2013    Knee Surgery    TONSILLECTOMY  01/16/2014    Tonsillectomy    TOTAL HIP ARTHROPLASTY  01/04/2017    Hip Replacement     Family History   Problem Relation Name Age of Onset    Other (sinus problem) Mother      Coronary artery disease Father      Diabetes Father      Breast cancer Sister      Diabetes Sister       Social History     Tobacco Use    Smoking status: Never     Passive exposure: Never    Smokeless tobacco: Never   Substance Use Topics    Alcohol use: Not on file    Drug use: Never       Physical Exam   ED Triage Vitals [12/08/24 1043]   Temperature Heart Rate Respirations BP   35.6 °C (96.1 °F) 58 16 (!) 182/90      SpO2 Temp Source Heart Rate Source Patient Position   97 % Temporal Monitor Sitting      BP Location FiO2 (%)     Right arm --       Physical Exam  Constitutional:       Appearance: Normal appearance.      Comments: Patient well-developed well-nourished very well Pleasant lady noted a left upper eyelid nodule out of her  drooling development without any fluctuant abscess.  Tiny not or drooling in the right lower eyelid.  Otherwise pupils are equal round spine light accommodation extraocular motor intact fundi is grossly within normal.  No periorbital edema Shelia throat is clear and tacky clear neck is supple lungs are clear heart regular rate and rhythm vascular abdomen soft positive bowel sound nontender no guarding rebound surgery.   HENT:      Head: Normocephalic and atraumatic.      Nose: Nose normal.      Mouth/Throat:      Mouth: Mucous membranes are moist.   Eyes:      Extraocular Movements: Extraocular movements intact.      Conjunctiva/sclera: Conjunctivae normal.      Pupils: Pupils are equal, round, and reactive to light.   Neck:      Vascular: No carotid bruit.   Cardiovascular:      Rate and Rhythm: Normal rate and regular rhythm.      Pulses: Normal pulses.      Heart sounds: Normal heart sounds. No murmur heard.     No friction rub. No gallop.   Pulmonary:      Effort: Pulmonary effort is normal. No respiratory distress.      Breath sounds: Normal breath sounds. No stridor. No rales.   Abdominal:      General: Abdomen is flat. Bowel sounds are normal.      Palpations: Abdomen is soft.   Musculoskeletal:         General: No swelling, tenderness, deformity or signs of injury. Normal range of motion.      Cervical back: Normal range of motion and neck supple. No rigidity or tenderness.      Right lower leg: No edema.      Left lower leg: No edema.   Lymphadenopathy:      Cervical: No cervical adenopathy.   Neurological:      General: No focal deficit present.      Mental Status: She is alert and oriented to person, place, and time.      Cranial Nerves: No cranial nerve deficit.      Sensory: No sensory deficit.      Motor: No weakness.      Coordination: Coordination normal.      Gait: Gait normal.      Deep Tendon Reflexes: Reflexes normal.   Psychiatric:         Mood and Affect: Mood normal.         Behavior: Behavior  normal.           ED Course & MDM   Diagnoses as of 12/19/24 6816   Hordeolum externum of right lower eyelid   Hordeolum externum left upper eyelid     Upon examination patient informed.  Dorsum of the right lower eyelid.  Patient was otherwise Examination Is Normal Throat Exam Intact within normal.  No visual complaint.  Did not look sick toxic distress.  Treated symptomatically for her duodenum after antibiotic treatment and management Tylenol for pain and if any problem concern or new symptom return.  Follow with ophthalmology.  Discharged home stable condition.      No data recorded                                 Procedure  Procedures     Ramsey Suresh MD  12/19/24 2366

## 2024-12-08 NOTE — DISCHARGE INSTRUCTIONS
You need to follow with ophthalmologist for left upper eyelid infected nodule if antibiotic does not work you may try a small incision and drainage to be done by ophthalmologist.  Right lower eyelid nodule nodule and should respond well to antibiotic.  If any problem or worsening symptoms return to ER call ophthalmology department at [facility] for follow-up.  If any problem or concern return to ER also see your primary care physician follow-up.

## 2024-12-08 NOTE — ED TRIAGE NOTES
Pt states to have stye in left eye on the upper lid. This has been there for the last few weeks. Also states to have a stye forming on the right lower eye lid, that just started today

## 2024-12-17 ENCOUNTER — APPOINTMENT (OUTPATIENT)
Dept: PRIMARY CARE | Facility: CLINIC | Age: 84
End: 2024-12-17
Payer: MEDICARE

## 2025-01-02 DIAGNOSIS — F41.9 ANXIETY: ICD-10-CM

## 2025-01-02 DIAGNOSIS — I10 PRIMARY HYPERTENSION: ICD-10-CM

## 2025-01-02 RX ORDER — ESCITALOPRAM OXALATE 20 MG/1
TABLET ORAL
Qty: 30 TABLET | Refills: 0 | Status: SHIPPED | OUTPATIENT
Start: 2025-01-02

## 2025-01-02 RX ORDER — LISINOPRIL 20 MG/1
20 TABLET ORAL DAILY
Qty: 30 TABLET | Refills: 0 | Status: SHIPPED | OUTPATIENT
Start: 2025-01-02

## 2025-01-13 ENCOUNTER — TELEPHONE (OUTPATIENT)
Dept: PRIMARY CARE | Facility: CLINIC | Age: 85
End: 2025-01-13
Payer: MEDICARE

## 2025-01-13 DIAGNOSIS — H00.019 HORDEOLUM EXTERNUM, UNSPECIFIED LATERALITY: Primary | ICD-10-CM

## 2025-01-13 RX ORDER — AMOXICILLIN 500 MG/1
500 CAPSULE ORAL EVERY 12 HOURS SCHEDULED
Qty: 20 CAPSULE | Refills: 0 | Status: SHIPPED | OUTPATIENT
Start: 2025-01-13 | End: 2025-01-23

## 2025-01-13 NOTE — TELEPHONE ENCOUNTER
Pt was wondering if she can get another antibiotic for her the sty in her eye. Said she took the 10 day one that the ED gave her and it went away but is now back.

## 2025-01-24 DIAGNOSIS — I10 PRIMARY HYPERTENSION: ICD-10-CM

## 2025-01-24 RX ORDER — LISINOPRIL 20 MG/1
20 TABLET ORAL DAILY
Qty: 30 TABLET | Refills: 0 | Status: SHIPPED | OUTPATIENT
Start: 2025-01-24

## 2025-02-04 DIAGNOSIS — I10 PRIMARY HYPERTENSION: ICD-10-CM

## 2025-02-04 DIAGNOSIS — F41.9 ANXIETY: ICD-10-CM

## 2025-02-04 RX ORDER — ESCITALOPRAM OXALATE 20 MG/1
TABLET ORAL
Qty: 30 TABLET | Refills: 0 | Status: SHIPPED | OUTPATIENT
Start: 2025-02-04

## 2025-02-04 RX ORDER — LISINOPRIL 20 MG/1
20 TABLET ORAL DAILY
Qty: 30 TABLET | Refills: 0 | Status: SHIPPED | OUTPATIENT
Start: 2025-02-04

## 2025-02-05 NOTE — PROGRESS NOTES
Subjective   Patient ID: Karina Lee is a 84 y.o. female who presents for Follow-up (ER a few months ago, she is having issues with her left, she was on an eye drop antibiotic, keeps coming back; ).    HPI     Karina is here for follow up. She has concerns about a cyst on her left eyelid. She did not get her labs done.    Eye lid infection- She has had an infected gland of her left upper eyelid back in December. This is still bothering her. She states she was placed on oral antibiotics and it wound up draining on its own. She was also placed on tobramycin eye drops which helped as well. She states its still bothering her, she is still having discomfort and some pain. She is using warm compress and a wash cloth to clean the crustiness off. She denies vision changes, contact lenses, cosmetic use, or fevers.     HTN: On labetalol and lisinopril, no SE's. BP elevated today, did not take her BP medication today.      Osteoporosis, osteoarthritis: Seeing Dr. Giraldo for injections in her knees, feeling much better after these. Has not been taking meloxicam.     Tremor: Improved on Inderol. She was having more arm and leg weakness. She is having a hard time getting out of bed in the morning. Not sleeping well at night.       Grief, poor short term memory: She is following with Shelley interiano. Doing better lately. She is currently on lexapro and has ativan for prn use which she is using sparingly. She does not plan on getting any further refills of the ativan at this point. She is taking 1/2 ativan at night. She sleeps rather well when she does take the ativan. She only takes it at night about 2 hours before she goes     DM:A1C 5.6% in March 2024 Diet controlled. Completed recent labs.      Hemorrhagic stroke: Dizziness is chronic/unchanged.     HLD: Back on atorvastatin, no SE's.    Swelling Lasix PRN.      All other systems have been reviewed and are negative for complaint     Objective   /79 (BP Location:  Right arm, Patient Position: Sitting, BP Cuff Size: Adult)   Pulse 57       Physical Exam  Gen: No acute distress, alert and oriented x3, pleasant   HEENT: moist mucous membranes, b/l external auditory canals are clear of debris, TMs within normal limits, no oropharyngeal lesions, eomi, perrla   Neck: thyroid within normal limits, no lymphadenopathy   CV: RRR, normal S1/S2, no murmur   Resp: Clear to auscultation bilaterally, no wheezes or rhonchi appreciated  Abd: soft, nontender, non-distended, no guarding/rigidity, bowel sounds present  Extr: no edema, no calf tenderness  Derm: Skin is warm and dry, no rashes appreciated  Psych: mood is good, affect is congruent, good hygiene, normal speech and eye contact  Neuro: cranial nerves grossly intact, intention tremor noted, no micrographia, she is wheelchair bound      Assessment/Plan       # Hordeolum Left upper eye lid  Erythromycin ointment 7 days  Discussed supportive care- warm compresses     #HTN  Continue inderal and Lisinopril.   Taking BP meds at night    #Poor short term memory  MOCA 21/30 (8/23/22)  discussed support systems that could be helpful  Family very involved    #Depression:  #Insomnia  on lexapro and wellbutrin  She is taking 1/2 tab ativan at bedtime   CSA signed Aug 2024  Ordering UDS, not completed     #Hemorrhagic stroke   has residual symptoms for previous stroke (weakness, dizziness)  On statin, BP is controlled, recently completed at home PT  Refill meclizine for dizziness     #Renal cysts  seen on CT/MRI by nephro- no need for intervention.  Monitored by imaging through nephro (Dr. Briscoe)    #CKD:  Lasix as needed  Has not been taking meloxicam   Recheck in 4 mo    #HLD  Controlled on atorvastatin     #DM  diet controlled, monitoring A1c  Ordered- was not completed for this  visit     #Cervical facet syndrome  not currently seeing pain management     #Osteoporosis  on bisphosphonates, will continue management     HCM:  UTD for flu, COVID  vaccine  Past the age for screening, will go based on symptoms

## 2025-02-07 ENCOUNTER — APPOINTMENT (OUTPATIENT)
Dept: PRIMARY CARE | Facility: CLINIC | Age: 85
End: 2025-02-07
Payer: MEDICARE

## 2025-02-07 VITALS — HEART RATE: 57 BPM | DIASTOLIC BLOOD PRESSURE: 79 MMHG | SYSTOLIC BLOOD PRESSURE: 162 MMHG

## 2025-02-07 DIAGNOSIS — H00.014 HORDEOLUM EXTERNUM OF LEFT UPPER EYELID: Primary | ICD-10-CM

## 2025-02-07 DIAGNOSIS — I10 PRIMARY HYPERTENSION: ICD-10-CM

## 2025-02-07 DIAGNOSIS — Z00.00 HEALTH CARE MAINTENANCE: ICD-10-CM

## 2025-02-07 RX ORDER — ERYTHROMYCIN 5 MG/G
OINTMENT OPHTHALMIC 4 TIMES DAILY
Qty: 3.5 G | Refills: 0 | Status: SHIPPED | OUTPATIENT
Start: 2025-02-07 | End: 2025-02-14

## 2025-03-04 DIAGNOSIS — F41.9 ANXIETY: ICD-10-CM

## 2025-03-04 RX ORDER — ESCITALOPRAM OXALATE 20 MG/1
TABLET ORAL
Qty: 30 TABLET | Refills: 0 | Status: SHIPPED | OUTPATIENT
Start: 2025-03-04

## 2025-03-12 DIAGNOSIS — E78.5 HYPERLIPIDEMIA, UNSPECIFIED HYPERLIPIDEMIA TYPE: ICD-10-CM

## 2025-03-12 RX ORDER — ATORVASTATIN CALCIUM 20 MG/1
TABLET, FILM COATED ORAL
Qty: 100 TABLET | Refills: 0 | Status: SHIPPED | OUTPATIENT
Start: 2025-03-12

## 2025-03-20 DIAGNOSIS — F32.A DEPRESSION, UNSPECIFIED DEPRESSION TYPE: ICD-10-CM

## 2025-03-21 DIAGNOSIS — I10 PRIMARY HYPERTENSION: ICD-10-CM

## 2025-03-21 RX ORDER — LISINOPRIL 20 MG/1
20 TABLET ORAL DAILY
Qty: 30 TABLET | Refills: 0 | Status: SHIPPED | OUTPATIENT
Start: 2025-03-21

## 2025-03-21 RX ORDER — BUPROPION HYDROCHLORIDE 150 MG/1
TABLET ORAL
Qty: 90 TABLET | Refills: 0 | Status: SHIPPED | OUTPATIENT
Start: 2025-03-21

## 2025-04-06 DIAGNOSIS — F41.9 ANXIETY: ICD-10-CM

## 2025-04-07 RX ORDER — ESCITALOPRAM OXALATE 20 MG/1
TABLET ORAL
Qty: 30 TABLET | Refills: 0 | Status: SHIPPED | OUTPATIENT
Start: 2025-04-07

## 2025-04-11 DIAGNOSIS — R21 RASH: ICD-10-CM

## 2025-04-11 RX ORDER — CLOTRIMAZOLE AND BETAMETHASONE DIPROPIONATE 10; .64 MG/G; MG/G
1 CREAM TOPICAL 2 TIMES DAILY
Qty: 45 G | Refills: 1 | Status: SHIPPED | OUTPATIENT
Start: 2025-04-11

## 2025-04-19 DIAGNOSIS — I10 PRIMARY HYPERTENSION: ICD-10-CM

## 2025-04-21 RX ORDER — LISINOPRIL 20 MG/1
20 TABLET ORAL DAILY
Qty: 30 TABLET | Refills: 0 | Status: SHIPPED | OUTPATIENT
Start: 2025-04-21

## 2025-04-29 ENCOUNTER — APPOINTMENT (OUTPATIENT)
Dept: CARDIOLOGY | Facility: HOSPITAL | Age: 85
DRG: 536 | End: 2025-04-29
Payer: MEDICARE

## 2025-04-29 ENCOUNTER — APPOINTMENT (OUTPATIENT)
Dept: RADIOLOGY | Facility: HOSPITAL | Age: 85
DRG: 536 | End: 2025-04-29
Payer: MEDICARE

## 2025-04-29 ENCOUNTER — HOSPITAL ENCOUNTER (INPATIENT)
Facility: HOSPITAL | Age: 85
LOS: 4 days | Discharge: NURSING, REHAB OR LONG-TERM CARE | End: 2025-05-04
Attending: EMERGENCY MEDICINE | Admitting: INTERNAL MEDICINE
Payer: MEDICARE

## 2025-04-29 DIAGNOSIS — S72.111A CLOSED AVULSION FRACTURE OF GREATER TROCHANTER OF FEMUR, RIGHT, INITIAL ENCOUNTER: ICD-10-CM

## 2025-04-29 DIAGNOSIS — R26.81 GAIT INSTABILITY: ICD-10-CM

## 2025-04-29 DIAGNOSIS — K59.03 DRUG-INDUCED CONSTIPATION: ICD-10-CM

## 2025-04-29 DIAGNOSIS — S72.114A NONDISPLACED FRACTURE OF GREATER TROCHANTER OF RIGHT FEMUR, INITIAL ENCOUNTER FOR CLOSED FRACTURE: Primary | ICD-10-CM

## 2025-04-29 DIAGNOSIS — R11.0 NAUSEA: ICD-10-CM

## 2025-04-29 DIAGNOSIS — G47.00 INSOMNIA, UNSPECIFIED TYPE: ICD-10-CM

## 2025-04-29 DIAGNOSIS — S79.911A HIP INJURY, RIGHT, INITIAL ENCOUNTER: ICD-10-CM
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
## 2025-04-29 DIAGNOSIS — R55 SYNCOPE, UNSPECIFIED SYNCOPE TYPE: ICD-10-CM

## 2025-04-29 DIAGNOSIS — J01.41 ACUTE RECURRENT PANSINUSITIS: ICD-10-CM

## 2025-04-29 LAB
ALBUMIN SERPL BCP-MCNC: 4.3 G/DL (ref 3.4–5)
ALP SERPL-CCNC: 61 U/L (ref 33–136)
ALT SERPL W P-5'-P-CCNC: 38 U/L (ref 7–45)
ANION GAP SERPL CALC-SCNC: <7 MMOL/L (ref 10–20)
APTT PPP: 30 SECONDS (ref 26–36)
AST SERPL W P-5'-P-CCNC: 36 U/L (ref 9–39)
BASOPHILS # BLD AUTO: 0.02 X10*3/UL (ref 0–0.1)
BASOPHILS NFR BLD AUTO: 0.4 %
BILIRUB SERPL-MCNC: 0.8 MG/DL (ref 0–1.2)
BUN SERPL-MCNC: 21 MG/DL (ref 6–23)
CALCIUM SERPL-MCNC: 9.3 MG/DL (ref 8.6–10.3)
CARDIAC TROPONIN I PNL SERPL HS: 11 NG/L (ref 0–13)
CARDIAC TROPONIN I PNL SERPL HS: 12 NG/L (ref 0–13)
CHLORIDE SERPL-SCNC: 104 MMOL/L (ref 98–107)
CO2 SERPL-SCNC: 27 MMOL/L (ref 21–32)
CREAT SERPL-MCNC: 1.18 MG/DL (ref 0.5–1.05)
EGFRCR SERPLBLD CKD-EPI 2021: 46 ML/MIN/1.73M*2
EOSINOPHIL # BLD AUTO: 0.04 X10*3/UL (ref 0–0.4)
EOSINOPHIL NFR BLD AUTO: 0.8 %
ERYTHROCYTE [DISTWIDTH] IN BLOOD BY AUTOMATED COUNT: 13.1 % (ref 11.5–14.5)
GLUCOSE SERPL-MCNC: 148 MG/DL (ref 74–99)
HCT VFR BLD AUTO: 43.6 % (ref 36–46)
HGB BLD-MCNC: 14.6 G/DL (ref 12–16)
IMM GRANULOCYTES # BLD AUTO: 0.05 X10*3/UL (ref 0–0.5)
IMM GRANULOCYTES NFR BLD AUTO: 1 % (ref 0–0.9)
INR PPP: 1.1 (ref 0.9–1.1)
LYMPHOCYTES # BLD AUTO: 0.77 X10*3/UL (ref 0.8–3)
LYMPHOCYTES NFR BLD AUTO: 15.8 %
MAGNESIUM SERPL-MCNC: 1.8 MG/DL (ref 1.6–2.4)
MCH RBC QN AUTO: 32.5 PG (ref 26–34)
MCHC RBC AUTO-ENTMCNC: 33.5 G/DL (ref 32–36)
MCV RBC AUTO: 97 FL (ref 80–100)
MONOCYTES # BLD AUTO: 0.37 X10*3/UL (ref 0.05–0.8)
MONOCYTES NFR BLD AUTO: 7.6 %
NEUTROPHILS # BLD AUTO: 3.63 X10*3/UL (ref 1.6–5.5)
NEUTROPHILS NFR BLD AUTO: 74.4 %
NRBC BLD-RTO: 0 /100 WBCS (ref 0–0)
PLATELET # BLD AUTO: 145 X10*3/UL (ref 150–450)
POTASSIUM SERPL-SCNC: 4.5 MMOL/L (ref 3.5–5.3)
PROT SERPL-MCNC: 6.7 G/DL (ref 6.4–8.2)
PROTHROMBIN TIME: 12.4 SECONDS (ref 9.8–12.4)
RBC # BLD AUTO: 4.49 X10*6/UL (ref 4–5.2)
SODIUM SERPL-SCNC: 132 MMOL/L (ref 136–145)
WBC # BLD AUTO: 4.9 X10*3/UL (ref 4.4–11.3)

## 2025-04-29 PROCEDURE — 85730 THROMBOPLASTIN TIME PARTIAL: CPT | Performed by: EMERGENCY MEDICINE

## 2025-04-29 PROCEDURE — 85610 PROTHROMBIN TIME: CPT | Performed by: EMERGENCY MEDICINE

## 2025-04-29 PROCEDURE — 96375 TX/PRO/DX INJ NEW DRUG ADDON: CPT

## 2025-04-29 PROCEDURE — 84484 ASSAY OF TROPONIN QUANT: CPT | Performed by: EMERGENCY MEDICINE

## 2025-04-29 PROCEDURE — 83735 ASSAY OF MAGNESIUM: CPT | Performed by: EMERGENCY MEDICINE

## 2025-04-29 PROCEDURE — 2500000001 HC RX 250 WO HCPCS SELF ADMINISTERED DRUGS (ALT 637 FOR MEDICARE OP): Performed by: NURSE PRACTITIONER

## 2025-04-29 PROCEDURE — 96376 TX/PRO/DX INJ SAME DRUG ADON: CPT

## 2025-04-29 PROCEDURE — G0378 HOSPITAL OBSERVATION PER HR: HCPCS

## 2025-04-29 PROCEDURE — 36415 COLL VENOUS BLD VENIPUNCTURE: CPT | Performed by: EMERGENCY MEDICINE

## 2025-04-29 PROCEDURE — 70450 CT HEAD/BRAIN W/O DYE: CPT

## 2025-04-29 PROCEDURE — 80053 COMPREHEN METABOLIC PANEL: CPT | Performed by: EMERGENCY MEDICINE

## 2025-04-29 PROCEDURE — 70450 CT HEAD/BRAIN W/O DYE: CPT | Performed by: RADIOLOGY

## 2025-04-29 PROCEDURE — 73502 X-RAY EXAM HIP UNI 2-3 VIEWS: CPT | Mod: RT

## 2025-04-29 PROCEDURE — 94760 N-INVAS EAR/PLS OXIMETRY 1: CPT

## 2025-04-29 PROCEDURE — 2500000002 HC RX 250 W HCPCS SELF ADMINISTERED DRUGS (ALT 637 FOR MEDICARE OP, ALT 636 FOR OP/ED): Performed by: NURSE PRACTITIONER

## 2025-04-29 PROCEDURE — 2500000004 HC RX 250 GENERAL PHARMACY W/ HCPCS (ALT 636 FOR OP/ED): Performed by: NURSE PRACTITIONER

## 2025-04-29 PROCEDURE — 72125 CT NECK SPINE W/O DYE: CPT

## 2025-04-29 PROCEDURE — 73552 X-RAY EXAM OF FEMUR 2/>: CPT | Mod: RIGHT SIDE | Performed by: RADIOLOGY

## 2025-04-29 PROCEDURE — 99285 EMERGENCY DEPT VISIT HI MDM: CPT | Mod: 25 | Performed by: EMERGENCY MEDICINE

## 2025-04-29 PROCEDURE — 73700 CT LOWER EXTREMITY W/O DYE: CPT | Mod: RIGHT SIDE | Performed by: RADIOLOGY

## 2025-04-29 PROCEDURE — 73700 CT LOWER EXTREMITY W/O DYE: CPT | Mod: RT

## 2025-04-29 PROCEDURE — 73502 X-RAY EXAM HIP UNI 2-3 VIEWS: CPT | Mod: RIGHT SIDE | Performed by: RADIOLOGY

## 2025-04-29 PROCEDURE — 85025 COMPLETE CBC W/AUTO DIFF WBC: CPT | Performed by: EMERGENCY MEDICINE

## 2025-04-29 PROCEDURE — 72125 CT NECK SPINE W/O DYE: CPT | Performed by: RADIOLOGY

## 2025-04-29 PROCEDURE — 2500000005 HC RX 250 GENERAL PHARMACY W/O HCPCS: Performed by: EMERGENCY MEDICINE

## 2025-04-29 PROCEDURE — 73552 X-RAY EXAM OF FEMUR 2/>: CPT | Mod: RT

## 2025-04-29 PROCEDURE — 2500000004 HC RX 250 GENERAL PHARMACY W/ HCPCS (ALT 636 FOR OP/ED): Mod: JZ

## 2025-04-29 PROCEDURE — 96374 THER/PROPH/DIAG INJ IV PUSH: CPT

## 2025-04-29 PROCEDURE — 2500000005 HC RX 250 GENERAL PHARMACY W/O HCPCS: Performed by: NURSE PRACTITIONER

## 2025-04-29 PROCEDURE — 93005 ELECTROCARDIOGRAM TRACING: CPT

## 2025-04-29 RX ORDER — NAPROXEN SODIUM 220 MG/1
81 TABLET, FILM COATED ORAL DAILY
Status: DISCONTINUED | OUTPATIENT
Start: 2025-04-30 | End: 2025-04-29

## 2025-04-29 RX ORDER — NAPROXEN SODIUM 220 MG/1
81 TABLET, FILM COATED ORAL DAILY
Status: DISCONTINUED | OUTPATIENT
Start: 2025-04-29 | End: 2025-05-04 | Stop reason: HOSPADM

## 2025-04-29 RX ORDER — ESCITALOPRAM OXALATE 10 MG/1
20 TABLET ORAL DAILY
Status: DISCONTINUED | OUTPATIENT
Start: 2025-04-30 | End: 2025-04-29

## 2025-04-29 RX ORDER — ONDANSETRON HYDROCHLORIDE 2 MG/ML
4 INJECTION, SOLUTION INTRAVENOUS EVERY 8 HOURS PRN
Status: DISCONTINUED | OUTPATIENT
Start: 2025-04-29 | End: 2025-05-04 | Stop reason: HOSPADM

## 2025-04-29 RX ORDER — FUROSEMIDE 20 MG/1
20 TABLET ORAL DAILY
Status: DISCONTINUED | OUTPATIENT
Start: 2025-04-30 | End: 2025-05-02

## 2025-04-29 RX ORDER — MELOXICAM 7.5 MG/1
15 TABLET ORAL DAILY
Status: DISCONTINUED | OUTPATIENT
Start: 2025-04-29 | End: 2025-05-02

## 2025-04-29 RX ORDER — TALC
3 POWDER (GRAM) TOPICAL NIGHTLY
Status: DISCONTINUED | OUTPATIENT
Start: 2025-04-29 | End: 2025-05-04 | Stop reason: HOSPADM

## 2025-04-29 RX ORDER — LISINOPRIL 20 MG/1
20 TABLET ORAL DAILY
Status: DISCONTINUED | OUTPATIENT
Start: 2025-04-30 | End: 2025-04-29

## 2025-04-29 RX ORDER — OXYCODONE HYDROCHLORIDE 5 MG/1
5 TABLET ORAL EVERY 6 HOURS PRN
Refills: 0 | Status: DISCONTINUED | OUTPATIENT
Start: 2025-04-29 | End: 2025-05-04 | Stop reason: HOSPADM

## 2025-04-29 RX ORDER — ONDANSETRON HYDROCHLORIDE 2 MG/ML
INJECTION, SOLUTION INTRAVENOUS
Status: COMPLETED
Start: 2025-04-29 | End: 2025-04-29

## 2025-04-29 RX ORDER — MORPHINE SULFATE 4 MG/ML
4 INJECTION INTRAVENOUS ONCE
Status: COMPLETED | OUTPATIENT
Start: 2025-04-29 | End: 2025-04-29

## 2025-04-29 RX ORDER — ONDANSETRON 4 MG/1
4 TABLET, ORALLY DISINTEGRATING ORAL EVERY 8 HOURS PRN
Status: DISCONTINUED | OUTPATIENT
Start: 2025-04-29 | End: 2025-05-04 | Stop reason: HOSPADM

## 2025-04-29 RX ORDER — SENNOSIDES 8.6 MG/1
2 TABLET ORAL NIGHTLY PRN
Status: DISCONTINUED | OUTPATIENT
Start: 2025-04-29 | End: 2025-05-02

## 2025-04-29 RX ORDER — BUPROPION HYDROCHLORIDE 150 MG/1
150 TABLET ORAL DAILY
Status: DISCONTINUED | OUTPATIENT
Start: 2025-04-30 | End: 2025-04-29

## 2025-04-29 RX ORDER — LISINOPRIL 20 MG/1
20 TABLET ORAL DAILY
Status: DISCONTINUED | OUTPATIENT
Start: 2025-04-29 | End: 2025-05-02

## 2025-04-29 RX ORDER — KETOROLAC TROMETHAMINE 30 MG/ML
15 INJECTION, SOLUTION INTRAMUSCULAR; INTRAVENOUS EVERY 6 HOURS
Status: DISCONTINUED | OUTPATIENT
Start: 2025-04-29 | End: 2025-04-30

## 2025-04-29 RX ORDER — ATORVASTATIN CALCIUM 10 MG/1
20 TABLET, FILM COATED ORAL DAILY
Status: DISCONTINUED | OUTPATIENT
Start: 2025-04-29 | End: 2025-04-29

## 2025-04-29 RX ORDER — MORPHINE SULFATE 4 MG/ML
INJECTION INTRAVENOUS
Status: COMPLETED
Start: 2025-04-29 | End: 2025-04-29

## 2025-04-29 RX ORDER — ACETAMINOPHEN 325 MG/1
650 TABLET ORAL EVERY 4 HOURS PRN
Status: DISCONTINUED | OUTPATIENT
Start: 2025-04-29 | End: 2025-04-30

## 2025-04-29 RX ORDER — PROPRANOLOL HYDROCHLORIDE 60 MG/1
120 CAPSULE, EXTENDED RELEASE ORAL DAILY
Status: DISCONTINUED | OUTPATIENT
Start: 2025-04-30 | End: 2025-04-29

## 2025-04-29 RX ORDER — ONDANSETRON 4 MG/1
4 TABLET, FILM COATED ORAL EVERY 6 HOURS
Status: DISCONTINUED | OUTPATIENT
Start: 2025-04-29 | End: 2025-04-29

## 2025-04-29 RX ORDER — ESCITALOPRAM OXALATE 10 MG/1
20 TABLET ORAL DAILY
Status: DISCONTINUED | OUTPATIENT
Start: 2025-04-29 | End: 2025-05-04 | Stop reason: HOSPADM

## 2025-04-29 RX ORDER — PROPRANOLOL HYDROCHLORIDE 60 MG/1
120 CAPSULE, EXTENDED RELEASE ORAL DAILY
Status: DISCONTINUED | OUTPATIENT
Start: 2025-04-29 | End: 2025-05-04 | Stop reason: HOSPADM

## 2025-04-29 RX ORDER — BUPROPION HYDROCHLORIDE 150 MG/1
150 TABLET ORAL DAILY
Status: DISCONTINUED | OUTPATIENT
Start: 2025-04-29 | End: 2025-05-04 | Stop reason: HOSPADM

## 2025-04-29 RX ORDER — OXYCODONE HYDROCHLORIDE 5 MG/1
10 TABLET ORAL EVERY 6 HOURS PRN
Refills: 0 | Status: DISCONTINUED | OUTPATIENT
Start: 2025-04-29 | End: 2025-05-04 | Stop reason: HOSPADM

## 2025-04-29 RX ORDER — LORAZEPAM 1 MG/1
1 TABLET ORAL NIGHTLY PRN
Status: DISCONTINUED | OUTPATIENT
Start: 2025-04-29 | End: 2025-05-04 | Stop reason: HOSPADM

## 2025-04-29 RX ORDER — ENOXAPARIN SODIUM 100 MG/ML
40 INJECTION SUBCUTANEOUS EVERY 24 HOURS
Status: DISCONTINUED | OUTPATIENT
Start: 2025-04-29 | End: 2025-04-30

## 2025-04-29 RX ORDER — HYDROCODONE BITARTRATE AND ACETAMINOPHEN 5; 325 MG/1; MG/1
1 TABLET ORAL EVERY 6 HOURS PRN
Qty: 12 TABLET | Refills: 0 | Status: SHIPPED | OUTPATIENT
Start: 2025-04-29 | End: 2025-04-29 | Stop reason: WASHOUT

## 2025-04-29 RX ORDER — ATORVASTATIN CALCIUM 10 MG/1
20 TABLET, FILM COATED ORAL DAILY
Status: DISCONTINUED | OUTPATIENT
Start: 2025-04-29 | End: 2025-05-04 | Stop reason: HOSPADM

## 2025-04-29 RX ORDER — ONDANSETRON HYDROCHLORIDE 2 MG/ML
4 INJECTION, SOLUTION INTRAVENOUS ONCE
Status: COMPLETED | OUTPATIENT
Start: 2025-04-29 | End: 2025-04-29

## 2025-04-29 RX ADMIN — MORPHINE SULFATE 4 MG: 4 INJECTION INTRAVENOUS at 12:06

## 2025-04-29 RX ADMIN — MORPHINE SULFATE 4 MG: 4 INJECTION, SOLUTION INTRAMUSCULAR; INTRAVENOUS at 12:06

## 2025-04-29 RX ADMIN — KETOROLAC TROMETHAMINE 15 MG: 30 INJECTION, SOLUTION INTRAMUSCULAR at 23:54

## 2025-04-29 RX ADMIN — KETOROLAC TROMETHAMINE 15 MG: 30 INJECTION, SOLUTION INTRAMUSCULAR at 18:24

## 2025-04-29 RX ADMIN — ESCITALOPRAM OXALATE 20 MG: 10 TABLET, FILM COATED ORAL at 21:12

## 2025-04-29 RX ADMIN — ONDANSETRON HYDROCHLORIDE 4 MG: 2 INJECTION, SOLUTION INTRAVENOUS at 12:06

## 2025-04-29 RX ADMIN — MELATONIN TAB 3 MG 3 MG: 3 TAB at 21:12

## 2025-04-29 RX ADMIN — ATORVASTATIN CALCIUM 20 MG: 10 TABLET, FILM COATED ORAL at 21:12

## 2025-04-29 RX ADMIN — LISINOPRIL 20 MG: 20 TABLET ORAL at 21:12

## 2025-04-29 RX ADMIN — BUPROPION HYDROCHLORIDE 150 MG: 150 TABLET, EXTENDED RELEASE ORAL at 21:12

## 2025-04-29 RX ADMIN — ONDANSETRON 4 MG: 2 INJECTION INTRAMUSCULAR; INTRAVENOUS at 12:06

## 2025-04-29 RX ADMIN — Medication 2 L/MIN: at 12:25

## 2025-04-29 RX ADMIN — PROPRANOLOL HYDROCHLORIDE 120 MG: 60 CAPSULE, EXTENDED RELEASE ORAL at 21:12

## 2025-04-29 RX ADMIN — ASPIRIN 81 MG: 81 TABLET, CHEWABLE ORAL at 21:12

## 2025-04-29 SDOH — ECONOMIC STABILITY: FOOD INSECURITY: WITHIN THE PAST 12 MONTHS, YOU WORRIED THAT YOUR FOOD WOULD RUN OUT BEFORE YOU GOT THE MONEY TO BUY MORE.: NEVER TRUE

## 2025-04-29 SDOH — SOCIAL STABILITY: SOCIAL INSECURITY: WITHIN THE LAST YEAR, HAVE YOU BEEN HUMILIATED OR EMOTIONALLY ABUSED IN OTHER WAYS BY YOUR PARTNER OR EX-PARTNER?: NO

## 2025-04-29 SDOH — SOCIAL STABILITY: SOCIAL INSECURITY: ARE THERE ANY APPARENT SIGNS OF INJURIES/BEHAVIORS THAT COULD BE RELATED TO ABUSE/NEGLECT?: NO

## 2025-04-29 SDOH — ECONOMIC STABILITY: HOUSING INSECURITY: IN THE LAST 12 MONTHS, WAS THERE A TIME WHEN YOU WERE NOT ABLE TO PAY THE MORTGAGE OR RENT ON TIME?: NO

## 2025-04-29 SDOH — SOCIAL STABILITY: SOCIAL INSECURITY: WITHIN THE LAST YEAR, HAVE YOU BEEN AFRAID OF YOUR PARTNER OR EX-PARTNER?: NO

## 2025-04-29 SDOH — SOCIAL STABILITY: SOCIAL INSECURITY
WITHIN THE LAST YEAR, HAVE YOU BEEN KICKED, HIT, SLAPPED, OR OTHERWISE PHYSICALLY HURT BY YOUR PARTNER OR EX-PARTNER?: NO

## 2025-04-29 SDOH — SOCIAL STABILITY: SOCIAL INSECURITY: ABUSE: ADULT

## 2025-04-29 SDOH — ECONOMIC STABILITY: TRANSPORTATION INSECURITY: IN THE PAST 12 MONTHS, HAS LACK OF TRANSPORTATION KEPT YOU FROM MEDICAL APPOINTMENTS OR FROM GETTING MEDICATIONS?: NO

## 2025-04-29 SDOH — SOCIAL STABILITY: SOCIAL INSECURITY: DO YOU FEEL UNSAFE GOING BACK TO THE PLACE WHERE YOU ARE LIVING?: NO

## 2025-04-29 SDOH — SOCIAL STABILITY: SOCIAL INSECURITY: WERE YOU ABLE TO COMPLETE ALL THE BEHAVIORAL HEALTH SCREENINGS?: YES

## 2025-04-29 SDOH — ECONOMIC STABILITY: INCOME INSECURITY: IN THE PAST 12 MONTHS HAS THE ELECTRIC, GAS, OIL, OR WATER COMPANY THREATENED TO SHUT OFF SERVICES IN YOUR HOME?: NO

## 2025-04-29 SDOH — SOCIAL STABILITY: SOCIAL INSECURITY
WITHIN THE LAST YEAR, HAVE YOU BEEN RAPED OR FORCED TO HAVE ANY KIND OF SEXUAL ACTIVITY BY YOUR PARTNER OR EX-PARTNER?: NO

## 2025-04-29 SDOH — SOCIAL STABILITY: SOCIAL INSECURITY: DO YOU FEEL ANYONE HAS EXPLOITED OR TAKEN ADVANTAGE OF YOU FINANCIALLY OR OF YOUR PERSONAL PROPERTY?: NO

## 2025-04-29 SDOH — ECONOMIC STABILITY: HOUSING INSECURITY: AT ANY TIME IN THE PAST 12 MONTHS, WERE YOU HOMELESS OR LIVING IN A SHELTER (INCLUDING NOW)?: NO

## 2025-04-29 SDOH — ECONOMIC STABILITY: FOOD INSECURITY: WITHIN THE PAST 12 MONTHS, THE FOOD YOU BOUGHT JUST DIDN'T LAST AND YOU DIDN'T HAVE MONEY TO GET MORE.: NEVER TRUE

## 2025-04-29 SDOH — SOCIAL STABILITY: SOCIAL INSECURITY: HAVE YOU HAD THOUGHTS OF HARMING ANYONE ELSE?: NO

## 2025-04-29 SDOH — SOCIAL STABILITY: SOCIAL INSECURITY: DOES ANYONE TRY TO KEEP YOU FROM HAVING/CONTACTING OTHER FRIENDS OR DOING THINGS OUTSIDE YOUR HOME?: NO

## 2025-04-29 SDOH — ECONOMIC STABILITY: FOOD INSECURITY: HOW HARD IS IT FOR YOU TO PAY FOR THE VERY BASICS LIKE FOOD, HOUSING, MEDICAL CARE, AND HEATING?: NOT HARD AT ALL

## 2025-04-29 SDOH — ECONOMIC STABILITY: HOUSING INSECURITY: IN THE PAST 12 MONTHS, HOW MANY TIMES HAVE YOU MOVED WHERE YOU WERE LIVING?: 0

## 2025-04-29 SDOH — SOCIAL STABILITY: SOCIAL INSECURITY: ARE YOU OR HAVE YOU BEEN THREATENED OR ABUSED PHYSICALLY, EMOTIONALLY, OR SEXUALLY BY ANYONE?: NO

## 2025-04-29 SDOH — SOCIAL STABILITY: SOCIAL INSECURITY: HAS ANYONE EVER THREATENED TO HURT YOUR FAMILY OR YOUR PETS?: NO

## 2025-04-29 ASSESSMENT — ACTIVITIES OF DAILY LIVING (ADL)
LACK_OF_TRANSPORTATION: NO
PATIENT'S MEMORY ADEQUATE TO SAFELY COMPLETE DAILY ACTIVITIES?: YES
ASSISTIVE_DEVICE: WALKER;EYEGLASSES;DENTURES UPPER
GROOMING: NEEDS ASSISTANCE
JUDGMENT_ADEQUATE_SAFELY_COMPLETE_DAILY_ACTIVITIES: YES
HEARING - LEFT EAR: FUNCTIONAL
LACK_OF_TRANSPORTATION: NO
DRESSING YOURSELF: NEEDS ASSISTANCE
BATHING: NEEDS ASSISTANCE
TOILETING: NEEDS ASSISTANCE
WALKS IN HOME: NEEDS ASSISTANCE
HEARING - RIGHT EAR: HEARING AID
ADEQUATE_TO_COMPLETE_ADL: YES
FEEDING YOURSELF: NEEDS ASSISTANCE

## 2025-04-29 ASSESSMENT — LIFESTYLE VARIABLES
HOW OFTEN DO YOU HAVE A DRINK CONTAINING ALCOHOL: NEVER
AUDIT-C TOTAL SCORE: 0
SKIP TO QUESTIONS 9-10: 1
HOW MANY STANDARD DRINKS CONTAINING ALCOHOL DO YOU HAVE ON A TYPICAL DAY: PATIENT DOES NOT DRINK
AUDIT-C TOTAL SCORE: 0
HOW OFTEN DO YOU HAVE 6 OR MORE DRINKS ON ONE OCCASION: NEVER

## 2025-04-29 ASSESSMENT — COGNITIVE AND FUNCTIONAL STATUS - GENERAL
MOVING TO AND FROM BED TO CHAIR: A LITTLE
DAILY ACTIVITIY SCORE: 15
MOBILITY SCORE: 12
EATING MEALS: A LITTLE
TURNING FROM BACK TO SIDE WHILE IN FLAT BAD: A LITTLE
STANDING UP FROM CHAIR USING ARMS: TOTAL
DRESSING REGULAR LOWER BODY CLOTHING: A LITTLE
PERSONAL GROOMING: A LITTLE
WALKING IN HOSPITAL ROOM: TOTAL
PATIENT BASELINE BEDBOUND: NO
MOVING FROM LYING ON BACK TO SITTING ON SIDE OF FLAT BED WITH BEDRAILS: A LITTLE
DRESSING REGULAR UPPER BODY CLOTHING: A LITTLE
HELP NEEDED FOR BATHING: A LOT
TOILETING: TOTAL
CLIMB 3 TO 5 STEPS WITH RAILING: TOTAL

## 2025-04-29 ASSESSMENT — PATIENT HEALTH QUESTIONNAIRE - PHQ9
1. LITTLE INTEREST OR PLEASURE IN DOING THINGS: MORE THAN HALF THE DAYS
SUM OF ALL RESPONSES TO PHQ9 QUESTIONS 1 & 2: 4
2. FEELING DOWN, DEPRESSED OR HOPELESS: MORE THAN HALF THE DAYS

## 2025-04-29 ASSESSMENT — PAIN DESCRIPTION - LOCATION
LOCATION: HIP

## 2025-04-29 ASSESSMENT — PAIN - FUNCTIONAL ASSESSMENT
PAIN_FUNCTIONAL_ASSESSMENT: 0-10

## 2025-04-29 ASSESSMENT — COLUMBIA-SUICIDE SEVERITY RATING SCALE - C-SSRS
2. HAVE YOU ACTUALLY HAD ANY THOUGHTS OF KILLING YOURSELF?: NO
1. IN THE PAST MONTH, HAVE YOU WISHED YOU WERE DEAD OR WISHED YOU COULD GO TO SLEEP AND NOT WAKE UP?: NO
6. HAVE YOU EVER DONE ANYTHING, STARTED TO DO ANYTHING, OR PREPARED TO DO ANYTHING TO END YOUR LIFE?: NO

## 2025-04-29 ASSESSMENT — PAIN DESCRIPTION - PAIN TYPE: TYPE: ACUTE PAIN

## 2025-04-29 ASSESSMENT — PAIN SCALES - GENERAL
PAINLEVEL_OUTOF10: 10 - WORST POSSIBLE PAIN
PAINLEVEL_OUTOF10: 8
PAINLEVEL_OUTOF10: 10 - WORST POSSIBLE PAIN
PAINLEVEL_OUTOF10: 0 - NO PAIN
PAINLEVEL_OUTOF10: 0 - NO PAIN

## 2025-04-29 ASSESSMENT — PAIN DESCRIPTION - ORIENTATION
ORIENTATION: RIGHT

## 2025-04-29 ASSESSMENT — PAIN DESCRIPTION - DESCRIPTORS: DESCRIPTORS: ACHING;DISCOMFORT

## 2025-04-29 NOTE — ED PROVIDER NOTES
HPI   Chief Complaint   Patient presents with    Fall       84-year-old female presents via EMS for evaluation for syncopal episode and fall.  She was going to the bathroom and passed out.  She fell and hit the right hip on the ground.  She is complaining of severe 10/10 in severity sharp right hip pain.  No other associated symptoms.  No urinary or bowel incontinence.  She is able to move both legs without difficulty.  Denies pain in arms chest abdomen or back.  Does not take blood thinners.      History provided by:  Patient and medical records          Patient History   Medical History[1]  Surgical History[2]  Family History[3]  Social History[4]    Physical Exam   ED Triage Vitals   Temperature Heart Rate Respirations BP   04/29/25 1035 04/29/25 1037 04/29/25 1037 --   36.8 °C (98.2 °F) 57 19       SpO2 Temp src Heart Rate Source Patient Position   04/29/25 1037 -- -- --   94 %         BP Location FiO2 (%)     -- --             Physical Exam  Vitals and nursing note reviewed.   Constitutional:       General: She is not in acute distress.     Appearance: She is well-developed.   HENT:      Head: Normocephalic and atraumatic.   Eyes:      Conjunctiva/sclera: Conjunctivae normal.   Cardiovascular:      Rate and Rhythm: Normal rate and regular rhythm.      Heart sounds: No murmur heard.  Pulmonary:      Effort: Pulmonary effort is normal. No respiratory distress.      Breath sounds: Normal breath sounds.   Abdominal:      Palpations: Abdomen is soft.      Tenderness: There is no abdominal tenderness.   Musculoskeletal:         General: Tenderness present. No swelling or deformity.      Cervical back: Neck supple.      Comments: Right hip tenderness.    No midline tenderness step-offs or deformities of entire spinal column.    Chest wall stable, secure.    No bony tenderness to bilateral upper extremities.    No bony tenderness to left lower extremity.    Aside from right hip tenderness there are no deformities or  tenderness throughout the remainder of the right lower extremity.    Strong 2+ bilateral radial and DP pulses.   Skin:     General: Skin is warm and dry.      Capillary Refill: Capillary refill takes less than 2 seconds.   Neurological:      General: No focal deficit present.      Mental Status: She is alert and oriented to person, place, and time. Mental status is at baseline.      Cranial Nerves: No cranial nerve deficit.      Sensory: No sensory deficit.      Motor: No weakness.   Psychiatric:         Mood and Affect: Mood normal.           ED Course & MDM   ED Course as of 04/29/25 1612   Tue Apr 29, 2025   1108 84-year-old female presents with syncope, fall, right hip injury.  Right hip tenderness without external signs of trauma.  Cardiac monitoring to check for dysrhythmias.  EKG.  Labs CT head and cervical limb.  X-ray right femur and right hip. [BT]   1205 ECG 12 lead  EKG interpreted by me shows sinus bradycardia with rate of 56.  Left bundle branch block.  Nonspecific ST-T changes.  No acute injury pattern.  Left bundle branch block was seen on prior EKG. [BT]   1207 CT head wo IV contrast  CT head with no acute intracranial process. [BT]   1207 CT cervical spine wo IV contrast  CT cervical spine with no fracture.  Cervical spondylosis. [BT]   1218 XR hip right with pelvis when performed 2 or 3 views  X-ray right and femur hip shows questionable nondisplaced greater trochanter fracture.  Will check CT right hip to further evaluate the area. [BT]   1231 Troponin I, High Sensitivity: 12  First troponin normal [BT]   1231 CBC and Auto Differential(!)  Borderline thrombocytopenia with platelets 145, chronic.  CBC otherwise unremarkable. [BT]   1231 PT/INR normal.  PTT normal. [BT]   1347 Troponin I, High Sensitivity: 11  Repeat troponin 11.  No cardiac dysrhythmias on telemetry.  Doubt ACS.  Doubt cardiac dysrhythmia as cause of syncope. [BT]   1348 BP: 153/76  Blood pressure much improved without  intervention at 153/76. [BT]   1355 CT hip right wo IV contrast  CT right hip confirms nondisplaced right greater trochanter femur fracture.  Will discuss with orthopedic surgery [BT]   1515 I spoke with orthopedic surgery Dr. Licea.  Weightbearing as tolerated.  Use walker.  Repeat x-rays in 3 weeks. [BT]   1515 Exact cause of syncope unclear.  No dangerous cardiac dysrhythmias anemia, or other explanation for her symptoms. [BT]   1525 We attempted ambulation.  Patient failed ambulation trial.  With standing, she feels lightheaded and is unable to navigate usage of her walker due to pain and lightheadedness.  She was helped back into the bed.  Given gait instability and ongoing intractable pain she will be hospitalized for observation. [BT]   1608 Comprehensive metabolic panel(!)  Chemistry noted.  Creatinine 1.18, improved from prior.  Borderline hyponatremia with sodium of 132.  Electrolytes otherwise largely unremarkable.  Magnesium normal. [BT]   1612 Case discussed with medicine hospitalist LAUREN Rm who accepted patient for hospitalization. [BT]      ED Course User Index  [BT] Ángel Godoy,          Diagnoses as of 04/29/25 1612   Fall, initial encounter   Syncope, unspecified syncope type   Hip injury, right, initial encounter   Nondisplaced fracture of greater trochanter of right femur, initial encounter for closed fracture   Gait instability     CT hip right wo IV contrast   Final Result   Nondisplaced fracture of the greater trochanter of the proximal right   femur.   Signed by Luca Bailey MD      XR hip right with pelvis when performed 2 or 3 views   Final Result   Findings suspicious for fracture of the greater trochanter of the   right femur.   Signed by Luca Bailey MD      XR femur right 2+ views   Final Result   Questionable fracture of the greater trochanter of the right femur.   Signed by Luca Bailey MD      CT cervical spine wo IV contrast   Final Result   No evidence for a fracture on  this exam. Cervical spondylosis as   described.        MACRO:   none        Signed by: Vincent Parra 4/29/2025 11:49 AM   Dictation workstation:   [id number]      CT head wo IV contrast   Final Result   No acute intracranial pathologic findings are identified.   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   Paranasal sinusitis as above.        MACRO:   none        Signed by: Vincent Parra 4/29/2025 11:45 AM   Dictation workstation:   [id number]                      No data recorded                                 Medical Decision Making      Procedure  Procedures         [1]   Past Medical History:  Diagnosis Date    Acute upper respiratory infection, unspecified 01/07/2015    Acute URI    Age-related osteoporosis without current pathological fracture     Osteoporosis    Anxiety disorder, unspecified     Anxiety    Cerebral infarction, unspecified 01/25/2018    CVA (cerebral vascular accident)    Dermatitis due to ingested food 03/03/2015    Allergic dermatitis due ingested food    Dizziness and giddiness 01/25/2018    Dizziness, nonspecific    Fatty (change of) liver, not elsewhere classified     Nonalcoholic fatty liver disease    Nutritional intake less than body requirements 01/03/2024    Other conditions influencing health status 01/25/2018    Asymmetrical hearing loss of both ears    Pain 01/03/2024    Pain in unspecified hip 01/25/2018    Hip pain    Personal history of other diseases of the circulatory system 01/19/2015    History of hypertension    Personal history of other diseases of the digestive system 01/25/2018    History of gastroesophageal reflux (GERD)    Personal history of other diseases of the musculoskeletal system and connective tissue     Personal history of arthritis    Personal history of other diseases of the nervous system and sense organs 02/04/2015    History of acute otitis externa    Personal history of other diseases of urinary system 03/05/2014    History of renal  insufficiency syndrome    Personal history of other endocrine, nutritional and metabolic disease 01/25/2018    History of hyperlipidemia    Personal history of other specified conditions 01/19/2015    History of headache    Personal history of other specified conditions 06/12/2019    History of diarrhea    Personal history of transient ischemic attack (TIA), and cerebral infarction without residual deficits 10/23/2017    History of cerebrovascular accident    Unspecified hearing loss, unspecified ear 01/25/2018    Hearing difficulty   [2]   Past Surgical History:  Procedure Laterality Date    ADENOIDECTOMY  01/19/2015    Adenoidectomy    BACK SURGERY  11/26/2013    Back Surgery    CHOLECYSTECTOMY  11/26/2013    Cholecystectomy    CT ANGIO NECK  6/3/2017    CT NECK ANGIO W AND WO IV CONTRAST 6/3/2017 [facility]    CT HEAD ANGIO W AND WO IV CONTRAST  6/3/2017    CT HEAD ANGIO W AND WO IV CONTRAST 6/3/2017 [facility] CLINICAL [facility]GAC[facility]    KNEE SURGERY  11/26/2013    Knee Surgery    TONSILLECTOMY  01/16/2014    Tonsillectomy    TOTAL HIP ARTHROPLASTY  01/04/2017    Hip Replacement   [3]   Family History  Problem Relation Name Age of Onset    Other (sinus problem) Mother      Coronary artery disease Father      Diabetes Father      Breast cancer Sister      Diabetes Sister     [4]   Social History  Tobacco Use    Smoking status: Never     Passive exposure: Never    Smokeless tobacco: Never   Substance Use Topics    Alcohol use: Not on file    Drug use: Never        Ángel Godoy DO  04/29/25 7729

## 2025-04-29 NOTE — DISCHARGE INSTRUCTIONS
You have a right hip fracture (greater trochanter of right femur) that does not require surgery.    You can put weight on your right leg as much as you are able to tolerate.    Use a walker or wheelchair.    Tylenol for mild pain.  Ice the area.  Norco for severe pain.    Follow up with Dr. Licea with orthopedic surgery for repeat x-rays in 2 - 3 weeks

## 2025-04-30 PROBLEM — S72.114A NONDISPLACED FRACTURE OF GREATER TROCHANTER OF RIGHT FEMUR, INITIAL ENCOUNTER FOR CLOSED FRACTURE: Status: ACTIVE | Noted: 2025-04-30

## 2025-04-30 PROBLEM — S72.111A: Status: ACTIVE | Noted: 2025-04-30

## 2025-04-30 PROBLEM — F41.9 ANXIETY AND DEPRESSION: Status: ACTIVE | Noted: 2023-02-27

## 2025-04-30 PROBLEM — J01.41 ACUTE RECURRENT PANSINUSITIS: Status: ACTIVE | Noted: 2025-04-30

## 2025-04-30 PROBLEM — I25.10 ASHD (ARTERIOSCLEROTIC HEART DISEASE): Status: ACTIVE | Noted: 2025-04-30

## 2025-04-30 LAB
ANION GAP SERPL CALC-SCNC: 7 MMOL/L (ref 10–20)
BUN SERPL-MCNC: 30 MG/DL (ref 6–23)
CALCIUM SERPL-MCNC: 8.6 MG/DL (ref 8.6–10.3)
CHLORIDE SERPL-SCNC: 102 MMOL/L (ref 98–107)
CO2 SERPL-SCNC: 28 MMOL/L (ref 21–32)
CREAT SERPL-MCNC: 1.97 MG/DL (ref 0.5–1.05)
EGFRCR SERPLBLD CKD-EPI 2021: 25 ML/MIN/1.73M*2
ERYTHROCYTE [DISTWIDTH] IN BLOOD BY AUTOMATED COUNT: 13.5 % (ref 11.5–14.5)
GLUCOSE SERPL-MCNC: 125 MG/DL (ref 74–99)
HCT VFR BLD AUTO: 39 % (ref 36–46)
HGB BLD-MCNC: 12.3 G/DL (ref 12–16)
MCH RBC QN AUTO: 31.6 PG (ref 26–34)
MCHC RBC AUTO-ENTMCNC: 31.5 G/DL (ref 32–36)
MCV RBC AUTO: 100 FL (ref 80–100)
NRBC BLD-RTO: 0 /100 WBCS (ref 0–0)
PLATELET # BLD AUTO: 126 X10*3/UL (ref 150–450)
POTASSIUM SERPL-SCNC: 4.8 MMOL/L (ref 3.5–5.3)
RBC # BLD AUTO: 3.89 X10*6/UL (ref 4–5.2)
SODIUM SERPL-SCNC: 132 MMOL/L (ref 136–145)
WBC # BLD AUTO: 5.5 X10*3/UL (ref 4.4–11.3)

## 2025-04-30 PROCEDURE — 2500000001 HC RX 250 WO HCPCS SELF ADMINISTERED DRUGS (ALT 637 FOR MEDICARE OP): Performed by: NURSE PRACTITIONER

## 2025-04-30 PROCEDURE — 36415 COLL VENOUS BLD VENIPUNCTURE: CPT | Performed by: NURSE PRACTITIONER

## 2025-04-30 PROCEDURE — 2500000005 HC RX 250 GENERAL PHARMACY W/O HCPCS: Performed by: NURSE PRACTITIONER

## 2025-04-30 PROCEDURE — 2500000002 HC RX 250 W HCPCS SELF ADMINISTERED DRUGS (ALT 637 FOR MEDICARE OP, ALT 636 FOR OP/ED): Performed by: NURSE PRACTITIONER

## 2025-04-30 PROCEDURE — 80048 BASIC METABOLIC PNL TOTAL CA: CPT | Performed by: NURSE PRACTITIONER

## 2025-04-30 PROCEDURE — 94760 N-INVAS EAR/PLS OXIMETRY 1: CPT

## 2025-04-30 PROCEDURE — 2500000004 HC RX 250 GENERAL PHARMACY W/ HCPCS (ALT 636 FOR OP/ED): Performed by: NURSE PRACTITIONER

## 2025-04-30 PROCEDURE — 99222 1ST HOSP IP/OBS MODERATE 55: CPT | Performed by: NURSE PRACTITIONER

## 2025-04-30 PROCEDURE — 2500000001 HC RX 250 WO HCPCS SELF ADMINISTERED DRUGS (ALT 637 FOR MEDICARE OP): Mod: IPSPLIT | Performed by: NURSE PRACTITIONER

## 2025-04-30 PROCEDURE — 1200000002 HC GENERAL ROOM WITH TELEMETRY DAILY: Mod: IPSPLIT

## 2025-04-30 PROCEDURE — 97166 OT EVAL MOD COMPLEX 45 MIN: CPT | Mod: GO,IPSPLIT

## 2025-04-30 PROCEDURE — 96376 TX/PRO/DX INJ SAME DRUG ADON: CPT

## 2025-04-30 PROCEDURE — 2500000005 HC RX 250 GENERAL PHARMACY W/O HCPCS: Mod: IPSPLIT | Performed by: NURSE PRACTITIONER

## 2025-04-30 PROCEDURE — 85027 COMPLETE CBC AUTOMATED: CPT | Performed by: NURSE PRACTITIONER

## 2025-04-30 PROCEDURE — 97162 PT EVAL MOD COMPLEX 30 MIN: CPT | Mod: GP,IPSPLIT | Performed by: PHYSICAL THERAPIST

## 2025-04-30 RX ORDER — AMOXICILLIN AND CLAVULANATE POTASSIUM 500; 125 MG/1; MG/1
1 TABLET, FILM COATED ORAL 2 TIMES DAILY
Status: DISCONTINUED | OUTPATIENT
Start: 2025-04-30 | End: 2025-05-03 | Stop reason: DRUGHIGH

## 2025-04-30 RX ORDER — ACETAMINOPHEN 325 MG/1
975 TABLET ORAL 3 TIMES DAILY
Status: DISCONTINUED | OUTPATIENT
Start: 2025-04-30 | End: 2025-05-04 | Stop reason: HOSPADM

## 2025-04-30 RX ORDER — FONDAPARINUX SODIUM 2.5 MG/.5ML
2.5 INJECTION SUBCUTANEOUS EVERY 24 HOURS
Status: DISCONTINUED | OUTPATIENT
Start: 2025-04-30 | End: 2025-04-30

## 2025-04-30 RX ORDER — KETOROLAC TROMETHAMINE 30 MG/ML
15 INJECTION, SOLUTION INTRAMUSCULAR; INTRAVENOUS EVERY 6 HOURS PRN
Status: DISCONTINUED | OUTPATIENT
Start: 2025-04-30 | End: 2025-04-30

## 2025-04-30 RX ORDER — FLUTICASONE PROPIONATE 50 MCG
2 SPRAY, SUSPENSION (ML) NASAL DAILY
Status: DISCONTINUED | OUTPATIENT
Start: 2025-04-30 | End: 2025-05-02

## 2025-04-30 RX ORDER — ENOXAPARIN SODIUM 100 MG/ML
30 INJECTION SUBCUTANEOUS EVERY 24 HOURS
Status: DISCONTINUED | OUTPATIENT
Start: 2025-04-30 | End: 2025-04-30

## 2025-04-30 RX ADMIN — ACETAMINOPHEN 975 MG: 325 TABLET ORAL at 12:15

## 2025-04-30 RX ADMIN — Medication 2 L/MIN: at 23:05

## 2025-04-30 RX ADMIN — ESCITALOPRAM OXALATE 20 MG: 10 TABLET, FILM COATED ORAL at 20:26

## 2025-04-30 RX ADMIN — ACETAMINOPHEN 975 MG: 325 TABLET ORAL at 20:26

## 2025-04-30 RX ADMIN — AMOXICILLIN AND CLAVULANATE POTASSIUM 1 TABLET: 500; 125 TABLET, FILM COATED ORAL at 20:26

## 2025-04-30 RX ADMIN — ACETAMINOPHEN 975 MG: 325 TABLET ORAL at 15:32

## 2025-04-30 RX ADMIN — Medication 2 L/MIN: at 09:04

## 2025-04-30 RX ADMIN — KETOROLAC TROMETHAMINE 15 MG: 30 INJECTION, SOLUTION INTRAMUSCULAR at 05:28

## 2025-04-30 RX ADMIN — ASPIRIN 81 MG: 81 TABLET, CHEWABLE ORAL at 20:26

## 2025-04-30 RX ADMIN — FLUTICASONE PROPIONATE 2 SPRAY: 50 SPRAY, METERED NASAL at 12:15

## 2025-04-30 RX ADMIN — MELATONIN TAB 3 MG 3 MG: 3 TAB at 20:26

## 2025-04-30 RX ADMIN — BUPROPION HYDROCHLORIDE 150 MG: 150 TABLET, EXTENDED RELEASE ORAL at 20:26

## 2025-04-30 RX ADMIN — ATORVASTATIN CALCIUM 20 MG: 10 TABLET, FILM COATED ORAL at 20:26

## 2025-04-30 RX ADMIN — AMOXICILLIN AND CLAVULANATE POTASSIUM 1 TABLET: 500; 125 TABLET, FILM COATED ORAL at 12:15

## 2025-04-30 RX ADMIN — OXYCODONE HYDROCHLORIDE 10 MG: 5 TABLET ORAL at 10:00

## 2025-04-30 ASSESSMENT — ENCOUNTER SYMPTOMS
FEVER: 1
FATIGUE: 1
APPETITE CHANGE: 1
RESPIRATORY NEGATIVE: 1
CARDIOVASCULAR NEGATIVE: 1
RHINORRHEA: 1
JOINT SWELLING: 1
ACTIVITY CHANGE: 1
NEUROLOGICAL NEGATIVE: 1
CHILLS: 1
ENDOCRINE NEGATIVE: 1
DIAPHORESIS: 1
EYES NEGATIVE: 1
GASTROINTESTINAL NEGATIVE: 1
PSYCHIATRIC NEGATIVE: 1

## 2025-04-30 ASSESSMENT — COGNITIVE AND FUNCTIONAL STATUS - GENERAL
MOVING TO AND FROM BED TO CHAIR: A LITTLE
MOBILITY SCORE: 12
MOVING FROM LYING ON BACK TO SITTING ON SIDE OF FLAT BED WITH BEDRAILS: A LITTLE
EATING MEALS: A LITTLE
CLIMB 3 TO 5 STEPS WITH RAILING: TOTAL
DRESSING REGULAR LOWER BODY CLOTHING: A LOT
WALKING IN HOSPITAL ROOM: TOTAL
CLIMB 3 TO 5 STEPS WITH RAILING: TOTAL
DAILY ACTIVITIY SCORE: 16
DAILY ACTIVITIY SCORE: 14
DRESSING REGULAR LOWER BODY CLOTHING: A LOT
CLIMB 3 TO 5 STEPS WITH RAILING: TOTAL
PERSONAL GROOMING: A LITTLE
PERSONAL GROOMING: A LITTLE
DRESSING REGULAR UPPER BODY CLOTHING: A LITTLE
MOVING TO AND FROM BED TO CHAIR: A LITTLE
MOVING TO AND FROM BED TO CHAIR: A LITTLE
TURNING FROM BACK TO SIDE WHILE IN FLAT BAD: A LITTLE
STANDING UP FROM CHAIR USING ARMS: A LITTLE
MOVING FROM LYING ON BACK TO SITTING ON SIDE OF FLAT BED WITH BEDRAILS: A LITTLE
DRESSING REGULAR UPPER BODY CLOTHING: A LITTLE
MOBILITY SCORE: 15
TURNING FROM BACK TO SIDE WHILE IN FLAT BAD: A LITTLE
MOVING FROM LYING ON BACK TO SITTING ON SIDE OF FLAT BED WITH BEDRAILS: A LITTLE
STANDING UP FROM CHAIR USING ARMS: TOTAL
TOILETING: TOTAL
PERSONAL GROOMING: A LITTLE
MOBILITY SCORE: 12
WALKING IN HOSPITAL ROOM: TOTAL
TOILETING: A LOT
EATING MEALS: A LITTLE
DAILY ACTIVITIY SCORE: 13
TOILETING: TOTAL
DRESSING REGULAR UPPER BODY CLOTHING: A LOT
HELP NEEDED FOR BATHING: A LOT
HELP NEEDED FOR BATHING: A LOT
WALKING IN HOSPITAL ROOM: A LOT
TURNING FROM BACK TO SIDE WHILE IN FLAT BAD: A LITTLE
STANDING UP FROM CHAIR USING ARMS: TOTAL
HELP NEEDED FOR BATHING: A LOT
DRESSING REGULAR LOWER BODY CLOTHING: A LOT

## 2025-04-30 ASSESSMENT — PAIN - FUNCTIONAL ASSESSMENT
PAIN_FUNCTIONAL_ASSESSMENT: 0-10
PAIN_FUNCTIONAL_ASSESSMENT: 0-10
PAIN_FUNCTIONAL_ASSESSMENT: UNABLE TO SELF-REPORT
PAIN_FUNCTIONAL_ASSESSMENT: 0-10

## 2025-04-30 ASSESSMENT — PAIN SCALES - GENERAL
PAINLEVEL_OUTOF10: 10 - WORST POSSIBLE PAIN
PAINLEVEL_OUTOF10: 0 - NO PAIN
PAINLEVEL_OUTOF10: 0 - NO PAIN
PAINLEVEL_OUTOF10: 10 - WORST POSSIBLE PAIN
PAINLEVEL_OUTOF10: 10 - WORST POSSIBLE PAIN

## 2025-04-30 ASSESSMENT — ACTIVITIES OF DAILY LIVING (ADL)
ADLS_ADDRESSED: NO
ADL_ASSISTANCE: INDEPENDENT
BATHING_ASSISTANCE: MODERATE
ADL_ASSISTANCE: INDEPENDENT

## 2025-04-30 ASSESSMENT — PAIN DESCRIPTION - ORIENTATION: ORIENTATION: RIGHT

## 2025-04-30 ASSESSMENT — PAIN DESCRIPTION - LOCATION: LOCATION: HIP

## 2025-04-30 ASSESSMENT — PAIN DESCRIPTION - DESCRIPTORS: DESCRIPTORS: ACHING;DISCOMFORT

## 2025-04-30 NOTE — PROGRESS NOTES
Physical Therapy    Physical Therapy Evaluation    Patient Name: Karina Lee  MRN: [id number]  Department: [facility] 3  Room: [address] [place] [address]  Today's Date: 4/30/2025   Time Calculation  Start Time: 1500  Stop Time: 1531  Time Calculation (min): 31 min    Assessment/Plan   PT Assessment  PT Assessment Results: Decreased strength, Decreased range of motion, Decreased endurance, Impaired balance, Decreased mobility, Pain, Impaired hearing, Impaired vision  Rehab Prognosis: Good  Barriers to Discharge Home: Physical needs  Physical Needs: Ambulating household distances limited by function/safety, Intermittent mobility assistance needed, High falls risk due to function or environment  Evaluation/Treatment Tolerance: Patient limited by pain  Medical Staff Made Aware: Yes  Strengths: Housing layout, Attitude of self  Barriers to Participation: Comorbidities, Premorbid level of function  End of Session Communication: Bedside nurse  Assessment Comment: pt displays  roms, strength and mobility deficits  End of Session Patient Position: Up in chair, Alarm on  IP OR SWING BED PT PLAN  Inpatient or Swing Bed: Inpatient  PT Plan  Treatment/Interventions: Bed mobility, Transfer training, Gait training, Stair training, Balance training, Neuromuscular re-education, Therapeutic exercise, Therapeutic activity, Strengthening, Home exercise program  PT Plan: Ongoing PT  PT Frequency: 4 times per week  PT Discharge Recommendations: Low intensity level of continued care  Equipment Recommended upon Discharge: Wheeled walker (2ww)  PT Recommended Transfer Status: Assist x1  PT - OK to Discharge: Yes    Subjective   General Visit Information:  General  Reason for Referral: assess gait and mobility after fall and R hip fracture  Referred By: WILLA Fitzpatrick-CNP  Past Medical History Relevant to Rehab: Acute URI, osteoporosis, anxiety, OA, CVA, GERD, HTN  Family/Caregiver Present: Yes  Caregiver Feedback: daughter present aided in  history  Co-Treatment: OT (partial)  Co-Treatment Reason: safety transfers  Prior to Session Communication: Bedside nurse  Patient Position Received: Bed, 2 rail up, Alarm on  Preferred Learning Style: kinesthetic, visual  General Comment: t agreeable to therapy but reports fatigue  Home Living:  Home Living  Type of Home: House  Lives With: Alone  Home Adaptive Equipment: Walker rolling or standard (rollartor)  Home Layout: One level, Laundry main level  Home Access: Stairs to enter without rails (4 inch threshold)  Entrance Stairs-Rails: None  Entrance Stairs-Number of Steps: 1  Bathroom Shower/Tub: Walk-in shower  Bathroom Toilet: Adaptive toilet seating  Bathroom Equipment: Grab bars in shower  Prior Level of Function:  Prior Function Per Pt/Caregiver Report  Level of [place]: Independent with ADLs and functional transfers, Independent with homemaking with wheelchair  Receives Help From: Family  ADL Assistance: Independent  Homemaking Assistance: Needs assistance  Driving/Transportation: Family/Friend  Ambulatory Assistance: Independent  Precautions:  Precautions  LE Weight Bearing Status: Weight Bearing as Tolerated  Medical Precautions: Fall precautions      Date/Time Vitals Session Patient Position Pulse Resp SpO2 BP MAP (mmHg)    04/30/25 1535 --  --  55  20  90 %  100/61  74                 Objective   Pain:  Pain Assessment  Pain Assessment: 0-10  0-10 (Numeric) Pain Score: 10 - Worst possible pain (with movement)  Cognition:       General Assessments:     Activity Tolerance  Endurance: Tolerates 10 - 20 min exercise with multiple rests         Strength  Strength Comments: 4/5 LLE, 2+/5 RLE  Strength  Strength Comments: 4/5 LLE, 2+/5 RLE           Coordination  Movements are Fluid and Coordinated: Yes    Postural Control  Postural Control: Within Functional Limits    Static Sitting Balance  Static Sitting-Level of Assistance: Modified independent  Dynamic Sitting Balance  Dynamic Sitting-Level of  Assistance: Modified independent    Static Standing Balance  Static Standing-Level of Assistance: Minimum assistance  Dynamic Standing Balance  Dynamic Standing-Level of Assistance: Minimum assistance  Functional Assessments:  ADL  ADL's Addressed: No    Bed Mobility  Bed Mobility: Yes  Bed Mobility 1  Bed Mobility 1: Supine to sitting, Rolling left  Level of Assistance 1: Minimum assistance  Bed Mobility Comments 1: rails    Transfers  Transfer: Yes  Transfer 1  Transfer From 1: Bed to  Transfer to 1: Stand  Technique 1: Sit to stand, Stand to sit  Transfer Device 1: Walker, Gait belt  Transfer Level of Assistance 1: +2, Moderate assistance  Trials/Comments 1: first attempt  Transfers 2  Transfer From 2: Bed to  Transfer to 2: Stand  Technique 2: Sit to stand, Stand to sit  Transfer Device 2: Gait belt, Walker  Transfer Level of Assistance 2: Minimum assistance  Trials/Comments 2: 2 attempt  Transfers 3  Transfer From 3: Bed to  Transfer to 3: Chair with arms  Technique 3: Stand pivot  Transfer Device 3: Walker  Transfer Level of Assistance 3: Minimum assistance    Ambulation/Gait Training  Ambulation/Gait Training Performed: Yes  Ambulation/Gait Training 1  Comments/Distance (ft) 1: attempt, unable d/t pain    Stairs  Stairs: No  Extremity/Trunk Assessments:  RLE   RLE : Exceptions to WFL  LLE   LLE : Within Functional Limits  Outcome Measures:  [facility] Basic Mobility  Turning from your back to your side while in a flat bed without using bedrails: A little  Moving from lying on your back to sitting on the side of a flat bed without using bedrails: A little  Moving to and from bed to chair (including a wheelchair): A little  Standing up from a chair using your arms (e.g. wheelchair or bedside chair): A little  To walk in hospital room: A lot  Climbing 3-5 steps with railing: Total  Basic Mobility - Total Score: 15    Encounter Problems       Encounter Problems (Active)       Balance       LTG - Patient will maintain  good static balance to allow for safe mobility       Start:  04/30/25    Expected End:  05/14/25               Mobility       LTG - Patient will be able to go up and down a curb/step with 2ww and KALEB        Start:  04/30/25    Expected End:  05/14/25            LTG - Patient will ambulate household distance KALEB with 2ww       Start:  04/30/25    Expected End:  05/14/25               Pain - Adult              Education Documentation  No documentation found.  Education Comments  No comments found.

## 2025-04-30 NOTE — PROGRESS NOTES
04/30/25 1702   Discharge Planning   Living Arrangements Children   Support Systems Children   Assistance Needed Met with pt and her daughter today. Pt was sleeping. Pt daughter reports that pt will have she and her siblings to care for Pt when she goes home. Daughter reports [facility] for Pt and Ot if needed.   Type of Residence Private residence   Who is requesting discharge planning? Patient   Home or Post Acute Services In home services   Type of Home Care Services Home OT;Home PT   Expected Discharge Disposition Home H   Patient Choice   Provider Choice list and CMS website (https://medicare.gov/care-compare#search) for post-acute Quality and Resource Measure Data were provided and reviewed with: Patient   Patient / Family choosing to utilize agency / facility established prior to hospitalization No   Stroke Family Assessment   Stroke Family Assessment Needed No   Intensity of Service   Intensity of Service 0-30 min     JENNIFER OLIVAS

## 2025-04-30 NOTE — H&P
History Of Present Illness  Karina Lee is a 84 y.o. female presenting with a complaint of a fall. EMS were called to her house due to a fall. The patient was ambulating to the bathroom when she passed out. She fell and hit her right hip on the ground. She has 10/10 pain that is sharp to the right hip. She denies chest pain, fever, chills, SOB, N/V/D/C.    In the ED:  VS: T 36.8; HR 57; R 20; /105; SpO2 94% on RA  Labs: Glu 148; Na 132; K 4.5; BUN 17; Cr 1.18; WBC 4.9: Hb 14.6; Hcrt 43.6;   Radiology: CT cervical spine No evidence for a fracture on this exam. Cervical spondylosis. CT head No acute intracranial pathologic findings are identified. 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 CT right hip Nondisplaced fracture of the greater trochanter of the proximal right femur. Xray right femur and hip Questionable fracture of the greater trochanter of the right femur.   Medication: morphine zofran  Disposition: admitted to med/surg telemetry     Past Medical History  Medical History[1]    Surgical History  Surgical History[2]     Social History  She reports that she has never smoked. She has never been exposed to tobacco smoke. She has never used smokeless tobacco. She reports that she does not use drugs. No history on file for alcohol use.    Family History  Family History[3]     Allergies  Anesthetics - sonja type- parabens, Cephalexin, Nitrofurantoin macrocrystal, Tetanus vaccines and toxoid, Tramadol, Oxycodone-acetaminophen, and Sulfa (sulfonamide antibiotics)    Review of Systems   Constitutional:  Positive for activity change, appetite change, chills, diaphoresis, fatigue and fever.   HENT:  Positive for postnasal drip and rhinorrhea.    Eyes: Negative.    Respiratory: Negative.     Cardiovascular: Negative.    Gastrointestinal: Negative.    Endocrine: Negative.    Genitourinary: Negative.    Musculoskeletal:  Positive for joint swelling.   Skin: Negative.    Neurological: Negative.   "  Psychiatric/Behavioral: Negative.          Physical Exam  Vitals reviewed.   Constitutional:       Appearance: Normal appearance. She is obese.   HENT:      Head: Normocephalic and atraumatic.      Right Ear: External ear normal.      Left Ear: External ear normal.      Nose: Congestion and rhinorrhea present.      Mouth/Throat:      Mouth: Mucous membranes are moist.      Pharynx: Oropharynx is clear.   Eyes:      Conjunctiva/sclera: Conjunctivae normal.      Pupils: Pupils are equal, round, and reactive to light.   Cardiovascular:      Rate and Rhythm: Normal rate and regular rhythm.      Pulses: Normal pulses.      Heart sounds: Normal heart sounds.   Pulmonary:      Effort: Pulmonary effort is normal.      Breath sounds: Normal breath sounds.   Abdominal:      General: Bowel sounds are normal.      Palpations: Abdomen is soft.   Musculoskeletal:         General: Swelling and tenderness present.      Cervical back: Normal range of motion and neck supple.      Comments: Limited ROM to right leg   Skin:     General: Skin is warm and dry.      Findings: Bruising present.   Neurological:      General: No focal deficit present.      Mental Status: She is alert and oriented to person, place, and time.   Psychiatric:         Mood and Affect: Mood normal.         Behavior: Behavior normal.          Last Recorded Vitals  Blood pressure 111/59, pulse 51, temperature 37.1 °C (98.8 °F), temperature source Temporal, resp. rate 20, height 1.676 m (5' 6\"), weight 89.4 kg (197 lb 0.1 oz), SpO2 94%.    Relevant Results    Scheduled medications  Scheduled Medications[4]  Continuous medications  Continuous Medications[5]  PRN medications  PRN Medications[6]    Results for orders placed or performed during the hospital encounter of 04/29/25 (from the past 24 hours)   CBC and Auto Differential   Result Value Ref Range    WBC 4.9 4.4 - 11.3 x10*3/uL    nRBC 0.0 0.0 - 0.0 /100 WBCs    RBC 4.49 4.00 - 5.20 x10*6/uL    Hemoglobin 14.6 " 12.0 - 16.0 g/dL    Hematocrit 43.6 36.0 - 46.0 %    MCV 97 80 - 100 fL    MCH 32.5 26.0 - 34.0 pg    MCHC 33.5 32.0 - 36.0 g/dL    RDW 13.1 11.5 - 14.5 %    Platelets 145 (L) 150 - 450 x10*3/uL    Neutrophils % 74.4 40.0 - 80.0 %    Immature Granulocytes %, Automated 1.0 (H) 0.0 - 0.9 %    Lymphocytes % 15.8 13.0 - 44.0 %    Monocytes % 7.6 2.0 - 10.0 %    Eosinophils % 0.8 0.0 - 6.0 %    Basophils % 0.4 0.0 - 2.0 %    Neutrophils Absolute 3.63 1.60 - 5.50 x10*3/uL    Immature Granulocytes Absolute, Automated 0.05 0.00 - 0.50 x10*3/uL    Lymphocytes Absolute 0.77 (L) 0.80 - 3.00 x10*3/uL    Monocytes Absolute 0.37 0.05 - 0.80 x10*3/uL    Eosinophils Absolute 0.04 0.00 - 0.40 x10*3/uL    Basophils Absolute 0.02 0.00 - 0.10 x10*3/uL   Protime-INR   Result Value Ref Range    Protime 12.4 9.8 - 12.4 seconds    INR 1.1 0.9 - 1.1   aPTT   Result Value Ref Range    aPTT 30 26 - 36 seconds   Troponin I, High Sensitivity, Initial   Result Value Ref Range    Troponin I, High Sensitivity 12 0 - 13 ng/L   ECG 12 lead   Result Value Ref Range    Ventricular Rate 56 BPM    Atrial Rate 56 BPM    HI Interval 190 ms    QRS Duration 146 ms    QT Interval 498 ms    QTC Calculation(Bazett) 480 ms    P Axis 26 degrees    R Axis 34 degrees    T Axis 186 degrees    QRS Count 9 beats    Q Onset 211 ms    P Onset 116 ms    P Offset 164 ms    T Offset 460 ms    QTC Fredericia 487 ms   Troponin, High Sensitivity, 1 Hour   Result Value Ref Range    Troponin I, High Sensitivity 11 0 - 13 ng/L   Comprehensive metabolic panel   Result Value Ref Range    Glucose 148 (H) 74 - 99 mg/dL    Sodium 132 (L) 136 - 145 mmol/L    Potassium 4.5 3.5 - 5.3 mmol/L    Chloride 104 98 - 107 mmol/L    Bicarbonate 27 21 - 32 mmol/L    Anion Gap <7 (L) 10 - 20 mmol/L    Urea Nitrogen 21 6 - 23 mg/dL    Creatinine 1.18 (H) 0.50 - 1.05 mg/dL    eGFR 46 (L) >60 mL/min/1.73m*2    Calcium 9.3 8.6 - 10.3 mg/dL    Albumin 4.3 3.4 - 5.0 g/dL    Alkaline Phosphatase 61 33  - 136 U/L    Total Protein 6.7 6.4 - 8.2 g/dL    AST 36 9 - 39 U/L    Bilirubin, Total 0.8 0.0 - 1.2 mg/dL    ALT 38 7 - 45 U/L   Magnesium   Result Value Ref Range    Magnesium 1.80 1.60 - 2.40 mg/dL   CBC   Result Value Ref Range    WBC 5.5 4.4 - 11.3 x10*3/uL    nRBC 0.0 0.0 - 0.0 /100 WBCs    RBC 3.89 (L) 4.00 - 5.20 x10*6/uL    Hemoglobin 12.3 12.0 - 16.0 g/dL    Hematocrit 39.0 36.0 - 46.0 %     80 - 100 fL    MCH 31.6 26.0 - 34.0 pg    MCHC 31.5 (L) 32.0 - 36.0 g/dL    RDW 13.5 11.5 - 14.5 %    Platelets 126 (L) 150 - 450 x10*3/uL             Assessment & Plan  Syncope and collapse    Benign essential HTN    Anxiety and depression    Dyslipidemia    Closed avulsion fracture of greater trochanter of femur, right, initial encounter    ASHD (arteriosclerotic heart disease)    Nondisplaced fracture of greater trochanter of right femur, initial encounter for closed fracture    Acute recurrent pansinusitis        Right Greater trochanter of femur fracture  Fall at home  - PT/OT evaluation  - WBAT  - Follow up with orthopedic outpatient, non surgical  - started acetaminophen 975 mg daily  - stopped toradol  - started oxycodone 5 - 10 mg q6h, prn  - started dilaudid 0.5 mg prn breakthrough    Acute paranasal sinusitis  - started augmentin 625 mg BID  - started flonase 2 spray daily    Syncope and collapse  ASHD  Dyslipidemia  Essential HTN  - troponin 12 > 11  - continue furosemide 20 mg daily, aspirin 81 mg daily, atorvastatin 20 mg daily  - hold lisinopril 20 mg daily  - monitor BP    Acute on Chronic kidney failure, Stage 3a  - baseline creatine 1.18  - creatine on admission 1.18; creatine today 1.97  - monitor renal function  - renally dose medication    Thrombocytopenia  -  > 126  - monitor CBC      Anxiety and depression  - continue bupropion 150 mg daily, escitalopram 20 mg daily, propranolol 120 mg daily    DVT ppx  - discontinued enoxaparin 30 mg daily  - started fondaparinux 2.5 mg  daily    Code status: Full    Disposition: Patient requires more than 2 inpatient days.    Seen and discussed with MD Lee Ann Mccray, WILLA-CNP  Attending Attestation:    Patient was seen and examined face to face, history and physical was taken personally at bedside the APRN-CNP, was present for the whole duration of the exam who participated in the documentation of this note. I performed the medical decision-making components (assessment and plan of care). I have reviewed the documentation and verified the findings in the note as written with additions or exceptions as stated in the body of this note.  Mechanical fall status post fracture of the greater trochanteric on right side, x-ray was reviewed by orthopedic surgery while patient was in the emergency room it was felt that patient no need for surgical procedure and weightbearing as tolerated.  To keep patient here with pain management and physical and Occupational Therapy.  Patient was seen this morning laying in bed in some still significant pain which increased with any movement of the right hip she does have some bruises over her back and posterior thigh on the right side heart is regular lungs fair air movement abdomen soft there is significant tenderness over the right hip, pedal pulses are present.  Place patient on Tylenol 1 g 3 times a day, 15 mg of Toradol IV as needed, we will also place patient on Percocet (patient was told she was allergic to Tylenol but she has been taking Percocet which contains Tylenol) muscle relaxer and she seems to be also on Dilaudid as needed.  But she has not been taking we will consult physical Occupational Therapy, DVT prophylaxis    Dr. Elias Patino MD  Internal Medicine        [1]   Past Medical History:  Diagnosis Date    Acute upper respiratory infection, unspecified 01/07/2015    Acute URI    Age-related osteoporosis without current pathological fracture     Osteoporosis    Anxiety      Anxiety disorder, unspecified     Anxiety    Arthritis     Cerebral infarction, unspecified 01/25/2018    CVA (cerebral vascular accident)    Dermatitis due to ingested food 03/03/2015    Allergic dermatitis due ingested food    Dizziness and giddiness 01/25/2018    Dizziness, nonspecific    Fatty (change of) liver, not elsewhere classified     Nonalcoholic fatty liver disease    GERD (gastroesophageal reflux disease)     Hypertension     Nutritional intake less than body requirements 01/03/2024    Other conditions influencing health status 01/25/2018    Asymmetrical hearing loss of both ears    Pain 01/03/2024    Pain in unspecified hip 01/25/2018    Hip pain    Personal history of other diseases of the circulatory system 01/19/2015    History of hypertension    Personal history of other diseases of the digestive system 01/25/2018    History of gastroesophageal reflux (GERD)    Personal history of other diseases of the musculoskeletal system and connective tissue     Personal history of arthritis    Personal history of other diseases of the nervous system and sense organs 02/04/2015    History of acute otitis externa    Personal history of other diseases of urinary system 03/05/2014    History of renal insufficiency syndrome    Personal history of other endocrine, nutritional and metabolic disease 01/25/2018    History of hyperlipidemia    Personal history of other specified conditions 01/19/2015    History of headache    Personal history of other specified conditions 06/12/2019    History of diarrhea    Personal history of transient ischemic attack (TIA), and cerebral infarction without residual deficits 10/23/2017    History of cerebrovascular accident    Stroke (Multi)     Unspecified hearing loss, unspecified ear 01/25/2018    Hearing difficulty   [2]   Past Surgical History:  Procedure Laterality Date    ADENOIDECTOMY  01/19/2015    Adenoidectomy    BACK SURGERY  11/26/2013    Back Surgery    CHOLECYSTECTOMY   11/26/2013    Cholecystectomy    CT ANGIO NECK  6/3/2017    CT NECK ANGIO W AND WO IV CONTRAST 6/3/2017 [facility]    CT HEAD ANGIO W AND WO IV CONTRAST  6/3/2017    CT HEAD ANGIO W AND WO IV CONTRAST 6/3/2017 [facility]    KNEE SURGERY  11/26/2013    Knee Surgery    TONSILLECTOMY  01/16/2014    Tonsillectomy    TOTAL HIP ARTHROPLASTY  01/04/2017    Hip Replacement   [3]   Family History  Problem Relation Name Age of Onset    Other (sinus problem) Mother      Coronary artery disease Father      Diabetes Father      Breast cancer Sister      Diabetes Sister     [4] aspirin, 81 mg, oral, Daily  atorvastatin, 20 mg, oral, Daily  buPROPion XL, 150 mg, oral, Daily  enoxaparin, 40 mg, subcutaneous, q24h  escitalopram, 20 mg, oral, Daily  furosemide, 20 mg, oral, Daily  ketorolac, 15 mg, intravenous, q6h  lisinopril, 20 mg, oral, Daily  melatonin, 3 mg, oral, Nightly  [Held by provider] meloxicam, 15 mg, oral, Daily  propranolol LA, 120 mg, oral, Daily  [5]    [6] PRN medications: acetaminophen, HYDROmorphone, LORazepam, ondansetron ODT **OR** ondansetron, oxyCODONE, oxyCODONE, oxygen, sennosides

## 2025-04-30 NOTE — PROGRESS NOTES
Occupational Therapy    Evaluation    Patient Name: Karina Lee  MRN: [id number]  Department: [facility]  Room: [address] [place] [address]  Today's Date: 4/30/2025  Time Calculation  Start Time: 1455  Stop Time: 1531  Time Calculation (min): 36 min    Assessment  IP OT Assessment  OT Assessment: RN cleared pt. PT/OT co-treat to maximize pt safety/anticipated level of A. Daughter present throughout. OT orders received and occupational profile established via interview method, data gathering, and review of medical records to determine moderate complexity and establish OT POC. At this time, pt displays significant decline from PLOF due to recent fall with R hip f/x with deficits including- diminished: ADL/IADL independence, functional mobility, balance, task toleration, and increased pain limiting particpation and safety within daily routine. At this time, pt would benefit from OT services to address deficit areas to restore QOL and reduce barriers prior to d/c home. At end, pt positioned into recliner with alarm on. All needs met and RN notified. Daughter remained in room.  Prognosis: Good  Barriers to Discharge Home: Caregiver assistance, Physical needs  Caregiver Assistance: Patient lives alone and/or does not have reliable caregiver assistance  Physical Needs: Ambulating household distances limited by function/safety, 24hr mobility assistance needed, Intermittent ADL assistance needed, High falls risk due to function or environment  Evaluation/Treatment Tolerance: Patient limited by pain  Medical Staff Made Aware: Yes  End of Session Communication: Bedside nurse, PCT/NA/CTA  End of Session Patient Position: Up in chair, Alarm on    Plan:  Treatment Interventions: ADL retraining, Functional transfer training, UE strengthening/ROM, Endurance training, Patient/family training, Neuromuscular reeducation  OT Frequency: 4 times per week  OT Discharge Recommendations: Moderate intensity level of continued care  OT Recommended Transfer Status:  Assist of 1, Minimal assist  OT - OK to Discharge: Yes    Subjective   Current Problem:  1. Nondisplaced fracture of greater trochanter of right femur, initial encounter for closed fracture  DISCONTINUED: HYDROcodone-acetaminophen (Norco) 5-325 mg tablet      2. Fall, initial encounter        3. Syncope, unspecified syncope type        4. Hip injury, right, initial encounter        5. Gait instability          General:  General  Reason for Referral: Assess ADLs  Referred By: PMHx includes RLS, DVT, asthma, depression, CVA with residual right sided weakness, seizure, tardive dyskinesia, CHF, hypothyroidism, factor V Leiden, currenting treated for osteomylitis  Past Medical History Relevant to Rehab: Acute URI, osteoporosis, anxiety, OA, CVA, GERD, HTN  Family/Caregiver Present: Yes  Caregiver Feedback: daughter present  Co-Treatment: PT  Co-Treatment Reason: maximize pt safety/A level anticipated  Prior to Session Communication: Bedside nurse, PCT/NA/CTA  Patient Position Received: Bed, 2 rail up, Alarm on  Preferred Learning Style: auditory, verbal  General Comment: Pt supine in bed, daughter present throughout. Agreeable to work with therapy, pleasant and cooperative throughout.    Precautions:  Hearing/Visual Limitations: Wear hearing aid/glasses  LE Weight Bearing Status: Weight Bearing as Tolerated  Medical Precautions: Fall precautions    Pain:  Pain Assessment  Pain Assessment: 0-10  0-10 (Numeric) Pain Score: 10 - Worst possible pain  Pain Type: Acute pain  Pain Location: Hip  Pain Orientation: Right  Pain Descriptors: Aching, Discomfort  Pain Frequency: Intermittent  Pain Onset: Sudden  Clinical Progression: Gradually worsening (with movement)  Pain Interventions: Repositioned  Response to Interventions: Content/relaxed    Objective   Cognition:  Overall Cognitive Status: Within Functional Limits  Orientation Level: Oriented X4    Home Living:  Type of Home: House  Lives With: Alone (2 daughters live nearby-  "daughter present today lives down the road.)  Home Adaptive Equipment: Walker rolling or standard  Home Layout: One level, Laundry main level  Home Access: Stairs to enter without rails  Entrance Stairs-Rails: None  Entrance Stairs-Number of Steps: 1 (4\" threshold)  Bathroom Shower/Tub: Walk-in shower  Bathroom Toilet: Adaptive toilet seating  Bathroom Equipment: Grab bars in shower  Home Living Comments: sleeps in reg bed     Prior Function:  Level of Camp: Independent with ADLs and functional transfers, Independent with homemaking with ambulation  Receives Help From: Family  ADL Assistance: Independent  Homemaking Assistance: Independent (family helps prn)  Driving/Transportation: Family/Friend  Ambulatory Assistance: Independent  Hand Dominance: Right  Prior Function Comments: per pt report, prior to hospital- independent with ADLs/IADLs with family available to help as needed. Independent with ambulation via AD. Recent increase in falls. 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    IADL History:  Homemaking Responsibilities: Yes  Current License: No  Mode of Transportation: Family, Friends    ADL:  Eating Assistance: Independent  Grooming Assistance: Stand by  Grooming Deficit: Setup, Supervision/safety  Bathing Assistance: Moderate (anticipated)  Bathing Deficit: Setup, Increased time to complete , Right lower leg including foot, Left lower leg including foot, Use of adaptive equipment  UE Dressing Assistance: Stand by (anticipated)  UE Dressing Deficit: Setup, Supervision/safety  LE Dressing Assistance: Maximal (anticipated)  LE Dressing Deficit: Setup, Supervision/safety, Increased time to complete, Don/doff R sock, Don/doff L sock, Thread RLE into pants, Thread LLE into pants, Thread RLE into underwear, Thread LLE into underwear, Don/doff R shoe, Don/doff L shoe, Use of adaptive equipment  Toileting Assistance with Device: Maximal (anticipated)  Toileting Deficit: Setup, Steadying, " Supervison/safety, Increased time to complete, Clothing management up, Clothing management down, Perineal hygiene, Bedside commode  Functional Assistance: Minimal  Functional Deficit: Steadying, Supervision/safety, Increased time to complete, Verbal cueing    Activity Tolerance:  Endurance: Tolerates 10 - 20 min exercise with multiple rests    Bed Mobility/Transfers:   Bed Mobility  Bed Mobility: Yes  Bed Mobility 1  Bed Mobility 1: Supine to sitting  Level of Assistance 1: Minimum assistance  Bed Mobility Comments 1: LB progression, specific to RLE. Rails used.  Bed Mobility 2  Bed Mobility  2: Scooting  Level of Assistance 2: Close supervision, Contact guard    Transfers  Transfer: Yes  Transfer 1  Transfer From 1: Sit to (from bed)  Transfer to 1: Stand  Technique 1: Sit to stand, Stand to sit  Transfer Device 1: Walker, Gait belt  Transfer Level of Assistance 1: Minimum assistance  Trials/Comments 1: multiple attemps- initial requiring more A with low tolerance to initial stand attempt.  Transfers 2  Transfer From 2: Bed to  Transfer to 2: Chair with arms  Technique 2: Stand pivot, To left  Transfer Device 2: Walker, Gait belt  Transfer Level of Assistance 2: Minimum assistance (additional person CGA for safety)    Sitting Balance:  Static Sitting Balance  Static Sitting-Level of Assistance: Close supervision, Contact guard  Dynamic Sitting Balance  Dynamic Sitting-Level of Assistance: Contact guard    Standing Balance:  Static Standing Balance  Static Standing-Level of Assistance: Contact guard, Minimum assistance  Dynamic Standing Balance  Dynamic Standing-Level of Assistance: Minimum assistance    IADL's:   Homemaking Responsibilities: Yes  Current License: No  Mode of Transportation: Family, Friends    Vision:   Vision - Basic Assessment  Current Vision: Wears glasses all the time    Sensation:  Light Touch: No apparent deficits    Strength:  Strength Comments: MOE WFL- grossly 4/4+ out of 5 throughout. Fair  + B . PMH CVA ~2017 with R residual weakness however did not observe significant difference between BUE.    Coordination:  Movements are Fluid and Coordinated: Yes     Hand Function:  Hand Function  Gross Grasp: Functional  Coordination: Functional    Extremities:   RUE   RUE : Within Functional Limits and LUE   LUE: Within Functional Limits    Outcome Measures:   [facility] Daily Activity  Putting on and taking off regular lower body clothing: A lot  Bathing (including washing, rinsing, drying): A lot  Putting on and taking off regular upper body clothing: A little  Toileting, which includes using toilet, bedpan or urinal: A lot  Taking care of personal grooming such as brushing teeth: A little  Eating Meals: None  Daily Activity - Total Score: 16    Education Documentation  Precautions, taught by Mary Shannon OT at 4/30/2025  5:41 PM.  Learner: Family, Patient  Readiness: Acceptance  Method: Explanation, Demonstration  Response: Needs Reinforcement  Comment: OT role/purposeTransfers- safety with ADWB precautions    ADL Training, taught by Mary Shannon OT at 4/30/2025  5:41 PM.  Learner: Family, Patient  Readiness: Acceptance  Method: Explanation, Demonstration  Response: Needs Reinforcement  Comment: OT role/purposeTransfers- safety with ADWB precautions    Education Comments  No comments found.      Goals:   Encounter Problems       Encounter Problems (Active)       ADLs       Patient with complete upper body dressing with supervision level of assistance donning and doffing all UE clothes with PRN adaptive equipment while supported sitting       Start:  04/30/25    Expected End:  05/14/25            Patient with complete lower body dressing with minimal assist  level of assistance donning and doffing all LE clothes  with PRN adaptive equipment while supported sitting       Start:  04/30/25    Expected End:  05/14/25            Patient will complete toileting including hygiene clothing  management/hygiene with minimal assist  level of assistance and raised toilet seat and grab bars.       Start:  04/30/25    Expected End:  05/14/25               BALANCE       Pt will maintain dynamic standing balance during ADL task with supervision level of assistance in order to demonstrate decreased risk of falling and improved postural control.       Start:  04/30/25    Expected End:  05/14/25               COGNITION/SAFETY       Patient will recall and adhere to weight bearing and /or ROM restrictions with all ADL and functional mobility in order to promote healing and safety with functional tasks       Start:  04/30/25    Expected End:  05/14/25               TRANSFERS       Patient will perform bed mobility supervision level of assistance and bed rails, overhead trapeze in order to improve safety and independence with mobility       Start:  04/30/25    Expected End:  05/14/25            Patient will complete sit to stand transfer with supervision level of assistance and least restrictive device in order to improve safety and prepare for out of bed mobility.       Start:  04/30/25    Expected End:  05/14/25

## 2025-04-30 NOTE — CARE PLAN
The patient's goals for the shift include  NA    The clinical goals for the shift include Pt will be stable on RA this shift    Pt tolerating scheduled Toradol, pain minimal in right leg/hip except with movement.   Stable on 2L O2. HR bradycardic throughout shift, 50's bpm.

## 2025-05-01 ENCOUNTER — APPOINTMENT (OUTPATIENT)
Dept: RADIOLOGY | Facility: HOSPITAL | Age: 85
DRG: 536 | End: 2025-05-01
Payer: MEDICARE

## 2025-05-01 LAB
ANION GAP SERPL CALC-SCNC: 7 MMOL/L (ref 10–20)
ATRIAL RATE: 56 BPM
BUN SERPL-MCNC: 41 MG/DL (ref 6–23)
CALCIUM SERPL-MCNC: 8.4 MG/DL (ref 8.6–10.3)
CHLORIDE SERPL-SCNC: 100 MMOL/L (ref 98–107)
CO2 SERPL-SCNC: 26 MMOL/L (ref 21–32)
CREAT SERPL-MCNC: 2.11 MG/DL (ref 0.5–1.05)
EGFRCR SERPLBLD CKD-EPI 2021: 23 ML/MIN/1.73M*2
ERYTHROCYTE [DISTWIDTH] IN BLOOD BY AUTOMATED COUNT: 13.3 % (ref 11.5–14.5)
GLUCOSE SERPL-MCNC: 142 MG/DL (ref 74–99)
HCT VFR BLD AUTO: 35.8 % (ref 36–46)
HGB BLD-MCNC: 11.6 G/DL (ref 12–16)
MCH RBC QN AUTO: 32.4 PG (ref 26–34)
MCHC RBC AUTO-ENTMCNC: 32.4 G/DL (ref 32–36)
MCV RBC AUTO: 100 FL (ref 80–100)
NRBC BLD-RTO: 0 /100 WBCS (ref 0–0)
P AXIS: 26 DEGREES
P OFFSET: 164 MS
P ONSET: 116 MS
PLATELET # BLD AUTO: 104 X10*3/UL (ref 150–450)
POTASSIUM SERPL-SCNC: 4.7 MMOL/L (ref 3.5–5.3)
PR INTERVAL: 190 MS
Q ONSET: 211 MS
QRS COUNT: 9 BEATS
QRS DURATION: 146 MS
QT INTERVAL: 498 MS
QTC CALCULATION(BAZETT): 480 MS
QTC FREDERICIA: 487 MS
R AXIS: 34 DEGREES
RBC # BLD AUTO: 3.58 X10*6/UL (ref 4–5.2)
SODIUM SERPL-SCNC: 128 MMOL/L (ref 136–145)
T AXIS: 186 DEGREES
T OFFSET: 460 MS
VENTRICULAR RATE: 56 BPM
WBC # BLD AUTO: 6 X10*3/UL (ref 4.4–11.3)

## 2025-05-01 PROCEDURE — 2500000001 HC RX 250 WO HCPCS SELF ADMINISTERED DRUGS (ALT 637 FOR MEDICARE OP): Mod: IPSPLIT | Performed by: NURSE PRACTITIONER

## 2025-05-01 PROCEDURE — 85027 COMPLETE CBC AUTOMATED: CPT | Mod: IPSPLIT | Performed by: NURSE PRACTITIONER

## 2025-05-01 PROCEDURE — 2500000005 HC RX 250 GENERAL PHARMACY W/O HCPCS: Mod: IPSPLIT | Performed by: NURSE PRACTITIONER

## 2025-05-01 PROCEDURE — 2500000004 HC RX 250 GENERAL PHARMACY W/ HCPCS (ALT 636 FOR OP/ED): Mod: JZ,IPSPLIT

## 2025-05-01 PROCEDURE — 2500000002 HC RX 250 W HCPCS SELF ADMINISTERED DRUGS (ALT 637 FOR MEDICARE OP, ALT 636 FOR OP/ED): Mod: IPSPLIT | Performed by: NURSE PRACTITIONER

## 2025-05-01 PROCEDURE — 1200000002 HC GENERAL ROOM WITH TELEMETRY DAILY: Mod: IPSPLIT

## 2025-05-01 PROCEDURE — 99232 SBSQ HOSP IP/OBS MODERATE 35: CPT | Performed by: NURSE PRACTITIONER

## 2025-05-01 PROCEDURE — 74018 RADEX ABDOMEN 1 VIEW: CPT | Mod: IPSPLIT

## 2025-05-01 PROCEDURE — 80048 BASIC METABOLIC PNL TOTAL CA: CPT | Mod: IPSPLIT | Performed by: NURSE PRACTITIONER

## 2025-05-01 PROCEDURE — 36415 COLL VENOUS BLD VENIPUNCTURE: CPT | Mod: IPSPLIT | Performed by: NURSE PRACTITIONER

## 2025-05-01 PROCEDURE — 94760 N-INVAS EAR/PLS OXIMETRY 1: CPT | Mod: IPSPLIT

## 2025-05-01 PROCEDURE — 74018 RADEX ABDOMEN 1 VIEW: CPT | Performed by: RADIOLOGY

## 2025-05-01 PROCEDURE — 97530 THERAPEUTIC ACTIVITIES: CPT | Mod: GP,IPSPLIT

## 2025-05-01 PROCEDURE — 97535 SELF CARE MNGMENT TRAINING: CPT | Mod: GO,IPSPLIT

## 2025-05-01 RX ORDER — SODIUM CHLORIDE 9 MG/ML
INJECTION, SOLUTION INTRAVENOUS
Status: COMPLETED
Start: 2025-05-01 | End: 2025-05-01

## 2025-05-01 RX ORDER — SODIUM CHLORIDE 9 MG/ML
100 INJECTION, SOLUTION INTRAVENOUS CONTINUOUS
Status: DISCONTINUED | OUTPATIENT
Start: 2025-05-01 | End: 2025-05-02

## 2025-05-01 RX ORDER — SODIUM CHLORIDE 9 MG/ML
75 INJECTION, SOLUTION INTRAVENOUS CONTINUOUS
Status: DISCONTINUED | OUTPATIENT
Start: 2025-05-01 | End: 2025-05-02

## 2025-05-01 RX ADMIN — BUPROPION HYDROCHLORIDE 150 MG: 150 TABLET, EXTENDED RELEASE ORAL at 20:04

## 2025-05-01 RX ADMIN — ACETAMINOPHEN 975 MG: 325 TABLET ORAL at 09:11

## 2025-05-01 RX ADMIN — ASPIRIN 81 MG: 81 TABLET, CHEWABLE ORAL at 20:03

## 2025-05-01 RX ADMIN — OXYCODONE HYDROCHLORIDE 10 MG: 5 TABLET ORAL at 12:41

## 2025-05-01 RX ADMIN — ACETAMINOPHEN 975 MG: 325 TABLET ORAL at 20:03

## 2025-05-01 RX ADMIN — ATORVASTATIN CALCIUM 20 MG: 10 TABLET, FILM COATED ORAL at 20:03

## 2025-05-01 RX ADMIN — SODIUM CHLORIDE 100 ML/HR: 0.9 INJECTION, SOLUTION INTRAVENOUS at 04:48

## 2025-05-01 RX ADMIN — AMOXICILLIN AND CLAVULANATE POTASSIUM 1 TABLET: 500; 125 TABLET, FILM COATED ORAL at 09:12

## 2025-05-01 RX ADMIN — ESCITALOPRAM OXALATE 20 MG: 10 TABLET, FILM COATED ORAL at 20:03

## 2025-05-01 RX ADMIN — OXYCODONE HYDROCHLORIDE 10 MG: 5 TABLET ORAL at 16:46

## 2025-05-01 RX ADMIN — ACETAMINOPHEN 975 MG: 325 TABLET ORAL at 14:02

## 2025-05-01 RX ADMIN — MELATONIN TAB 3 MG 3 MG: 3 TAB at 20:03

## 2025-05-01 RX ADMIN — SODIUM CHLORIDE 100 ML/HR: 9 INJECTION, SOLUTION INTRAVENOUS at 14:04

## 2025-05-01 RX ADMIN — PROPRANOLOL HYDROCHLORIDE 120 MG: 60 CAPSULE, EXTENDED RELEASE ORAL at 20:04

## 2025-05-01 RX ADMIN — Medication 2 L/MIN: at 21:14

## 2025-05-01 RX ADMIN — AMOXICILLIN AND CLAVULANATE POTASSIUM 1 TABLET: 500; 125 TABLET, FILM COATED ORAL at 20:04

## 2025-05-01 RX ADMIN — SODIUM CHLORIDE 100 ML/HR: 0.9 INJECTION, SOLUTION INTRAVENOUS at 14:04

## 2025-05-01 RX ADMIN — SODIUM CHLORIDE 100 ML/HR: 9 INJECTION, SOLUTION INTRAVENOUS at 04:48

## 2025-05-01 ASSESSMENT — COGNITIVE AND FUNCTIONAL STATUS - GENERAL
MOBILITY SCORE: 11
DAILY ACTIVITIY SCORE: 16
DRESSING REGULAR UPPER BODY CLOTHING: A LITTLE
TOILETING: A LOT
TURNING FROM BACK TO SIDE WHILE IN FLAT BAD: A LOT
PERSONAL GROOMING: A LITTLE
MOVING FROM LYING ON BACK TO SITTING ON SIDE OF FLAT BED WITH BEDRAILS: A LOT
DRESSING REGULAR LOWER BODY CLOTHING: A LOT
CLIMB 3 TO 5 STEPS WITH RAILING: TOTAL
WALKING IN HOSPITAL ROOM: A LOT
MOVING TO AND FROM BED TO CHAIR: A LOT
HELP NEEDED FOR BATHING: A LOT
STANDING UP FROM CHAIR USING ARMS: A LOT

## 2025-05-01 ASSESSMENT — PAIN SCALES - GENERAL
PAINLEVEL_OUTOF10: 9
PAINLEVEL_OUTOF10: 5 - MODERATE PAIN
PAINLEVEL_OUTOF10: 10 - WORST POSSIBLE PAIN
PAINLEVEL_OUTOF10: 8
PAINLEVEL_OUTOF10: 5 - MODERATE PAIN

## 2025-05-01 ASSESSMENT — PAIN DESCRIPTION - LOCATION
LOCATION: HIP

## 2025-05-01 ASSESSMENT — PAIN - FUNCTIONAL ASSESSMENT
PAIN_FUNCTIONAL_ASSESSMENT: 0-10

## 2025-05-01 ASSESSMENT — PAIN DESCRIPTION - ORIENTATION
ORIENTATION: RIGHT

## 2025-05-01 ASSESSMENT — PAIN DESCRIPTION - DESCRIPTORS: DESCRIPTORS: ACHING;DISCOMFORT;PRESSURE;RADIATING

## 2025-05-01 ASSESSMENT — ACTIVITIES OF DAILY LIVING (ADL): HOME_MANAGEMENT_TIME_ENTRY: 24

## 2025-05-01 NOTE — PROGRESS NOTES
Karina Lee is a 84 y.o. female on day 1 of admission presenting with Syncope and collapse.      Subjective   Patient assessed at bedside; lying in bed. She is still in a lot of pain with movement. She got up with PT/OT but was in a lot of pain. She denies SOB, fever, chills.       Objective     Last Recorded Vitals  /72 (BP Location: Right arm, Patient Position: Sitting)   Pulse 56   Temp 36.8 °C (98.2 °F) (Temporal)   Resp 20   Wt 89.4 kg (197 lb 0.1 oz)   SpO2 92%   Intake/Output last 3 Shifts:    Intake/Output Summary (Last 24 hours) at 5/1/2025 1517  Last data filed at 5/1/2025 1324  Gross per 24 hour   Intake 910 ml   Output --   Net 910 ml       Admission Weight  Weight: 89.4 kg (197 lb 0.1 oz) (04/29/25 1035)    Daily Weight  04/29/25 : 89.4 kg (197 lb 0.1 oz)    Image Results      Physical Exam  Vitals reviewed.   Constitutional:       Appearance: Normal appearance. She is obese.   HENT:      Head: Normocephalic and atraumatic.      Right Ear: External ear normal.      Left Ear: External ear normal.      Nose: Congestion and rhinorrhea present.      Mouth/Throat:      Mouth: Mucous membranes are moist.      Pharynx: Oropharynx is clear.   Eyes:      Conjunctiva/sclera: Conjunctivae normal.      Pupils: Pupils are equal, round, and reactive to light.   Cardiovascular:      Rate and Rhythm: Normal rate and regular rhythm.      Pulses: Normal pulses.      Heart sounds: Normal heart sounds.   Pulmonary:      Effort: Pulmonary effort is normal.      Breath sounds: Normal breath sounds.   Abdominal:      General: Bowel sounds are normal.      Palpations: Abdomen is soft.   Musculoskeletal:         General: Swelling and tenderness present.      Cervical back: Normal range of motion and neck supple.      Comments: Limited ROM to right leg   Skin:     General: Skin is warm and dry.      Findings: Bruising present.   Neurological:      General: No focal deficit present.      Mental Status: She is alert  and oriented to person, place, and time.   Psychiatric:         Mood and Affect: Mood normal.         Behavior: Behavior normal.          Relevant Results             Assessment & Plan  Syncope and collapse    Benign essential HTN    Anxiety and depression    Dyslipidemia    Closed avulsion fracture of greater trochanter of femur, right, initial encounter    ASHD (arteriosclerotic heart disease)    Nondisplaced fracture of greater trochanter of right femur, initial encounter for closed fracture    Acute recurrent pansinusitis    Right Greater trochanter of femur fracture  Fall at home  - PT/OT evaluation  - WBAT  - Follow up with orthopedic outpatient, non surgical  - started acetaminophen 975 mg daily  - stopped toradol  - started oxycodone 5 - 10 mg q6h, prn  - started dilaudid 0.5 mg prn breakthrough     Acute paranasal sinusitis  - started augmentin 625 mg BID  - started flonase 2 spray daily     Syncope and collapse  ASHD  Dyslipidemia  Essential HTN  - troponin 12 > 11  - continue furosemide 20 mg daily, aspirin 81 mg daily, atorvastatin 20 mg daily  - hold lisinopril 20 mg daily  - monitor BP     Acute on Chronic kidney failure, Stage 3a  Urine retention  - baseline creatine 1.18  - creatine on admission 1.18; creatine today 2.11  - monitor renal function  - renally dose medication  - started 0.9% NS at 75 mL/hr x 1 day  - bladder scanned for > 400 mL  - inserted angel catheter  - KUB pending    Hyponatremia  - Na 132 > 128  - started 0.9% NS at 75 mL/hr     Thrombocytopenia  Normocytic anemia  -  > 126 > 104  - Hb 11.6;   - monitor CBC     Anxiety and depression  - continue bupropion 150 mg daily, escitalopram 20 mg daily, propranolol 120 mg daily     DVT ppx  - discontinued enoxaparin 30 mg daily  - discontinued fondaparinux 2.5 mg daily     Code status: Full     Disposition: Patient requires more than 2 inpatient days.           Lee Ann Garnett, APRN-CNP

## 2025-05-01 NOTE — PROGRESS NOTES
Physical Therapy    Physical Therapy Treatment    Patient Name: Karina Lee  MRN: [id number]  Department: [facility]  Room: [address] [place] [address]  Today's Date: 5/1/2025  Time Calculation  Start Time: 0915  Stop Time: 0935  Time Calculation (min): 20 min    Assessment/Plan   PT Assessment  End of Session Communication: Bedside nurse  Assessment Comment: Mobilizing slowly. Will require rehab stay prior to discharge home.  End of Session Patient Position: Up in chair, Alarm on     PT Plan  Treatment/Interventions: Bed mobility, Transfer training, Gait training, Stair training, Balance training, Neuromuscular re-education, Therapeutic exercise, Therapeutic activity, Strengthening, Home exercise program  PT Plan: Ongoing PT  PT Frequency: 4 times per week  PT Discharge Recommendations: Low intensity level of continued care  Equipment Recommended upon Discharge: Wheeled walker (2ww)  PT Recommended Transfer Status: Assist x1  PT - OK to Discharge: Yes    General Visit Information:   PT  Visit  PT Received On: 05/01/25  General  Prior to Session Communication: Bedside nurse  Patient Position Received: Bed, 2 rail up, Alarm on  General Comment: Willing to participate. No rest pain but increases with movement and worsening with WB.    Subjective   Precautions:  Precautions  LE Weight Bearing Status: Weight Bearing as Tolerated     Date/Time Vitals Session Patient Position Pulse Resp SpO2 BP MAP (mmHg)    05/01/25 0924 --  --  --  --  95 %  --  --           Objective   Pain:  Pain Assessment  Pain Assessment: 0-10  0-10 (Numeric) Pain Score: 5 - Moderate pain    Cognition:  Cognition  Overall Cognitive Status: Within Functional Limits     Postural Control:  Static Sitting Balance  Static Sitting-Level of Assistance: Modified independent  Dynamic Sitting Balance  Dynamic Sitting-Level of Assistance: Close supervision  Static Standing Balance  Static Standing-Level of Assistance: Close supervision  Dynamic Standing Balance  Dynamic  Standing-Level of Assistance: Minimum assistance     Treatments:   Bed Mobility 1  Bed Mobility 1: Supine to sitting  Level of Assistance 1: Maximum assistance    Ambulation/Gait Training 1  Device 1: Rolling walker  Assistance 1: Moderate assistance, Moderate verbal cues  Quality of Gait 1: Diminished heel strike, Inconsistent stride length, Decreased step length, Forward flexed posture  Comments/Distance (ft) 1: 5 ft  Transfer 1  Technique 1: Sit to stand  Transfer Level of Assistance 1: Moderate assistance    Outcome Measures:  [facility] Basic Mobility  Turning from your back to your side while in a flat bed without using bedrails: A lot  Moving from lying on your back to sitting on the side of a flat bed without using bedrails: A lot  Moving to and from bed to chair (including a wheelchair): A lot  Standing up from a chair using your arms (e.g. wheelchair or bedside chair): A lot  To walk in hospital room: A lot  Climbing 3-5 steps with railing: Total  Basic Mobility - Total Score: 11    Education Documentation  No documentation found.  Education Comments  No comments found.        OP EDUCATION:       Encounter Problems       Encounter Problems (Active)       Balance       LTG - Patient will maintain good static balance to allow for safe mobility       Start:  04/30/25    Expected End:  05/14/25               Mobility       LTG - Patient will be able to go up and down a curb/step with 2ww and KALEB        Start:  04/30/25    Expected End:  05/14/25            LTG - Patient will ambulate household distance KALEB with 2ww       Start:  04/30/25    Expected End:  05/14/25               Pain - Adult

## 2025-05-01 NOTE — CARE PLAN
The patient has had an uneventful shift, the patient has had little to no requests throughout the shift, the patient has had scant output this shift, the patient was bladder scanned finding only 100cc of urine in bladder. Patient stated she drank minimal water at the time. Dr. Trivedi notified and Normal Saline 100ml/hr ordered. Otherwise the patient has had a positive shift, the patient has no complaints at this time. The call bell is within reach. Will continue to monitor and will continue the plan of care as previously stated.

## 2025-05-01 NOTE — PROGRESS NOTES
Occupational Therapy    Occupational Therapy Treatment    Name: Karina Lee  MRN: [id number]  Department: [facility]  Room: [address] [place] [address]  Date: 05/01/25  Time Calculation  Start Time: 1343  Stop Time: 1407  Time Calculation (min): 24 min    Assessment:  OT Assessment: RN cleared pt. At today's date, pt experiencing significant levels of pain '10/10' which limited ability to participate and/or tolerate session. Pt did demonstrate improved standing tolerance/ability to ambulate stepping today compared to yesterday at [facility] in which pt was unable to ambulate, only SPT to chair. Although high pain levels, pt does demonstrate good motivation/effort to participate within session. At end, pt positioned back in bed with alarm on. RN and aide remaining in room to complete bladder scan as pt unable to void on commode (minimal droplets/sprinkling). All needs met.  Prognosis: Good  Barriers to Discharge Home: Caregiver assistance, Physical needs  Caregiver Assistance: Patient lives alone and/or does not have reliable caregiver assistance  Physical Needs: Ambulating household distances limited by function/safety, 24hr mobility assistance needed, Intermittent ADL assistance needed, High falls risk due to function or environment  Evaluation/Treatment Tolerance: Patient limited by pain  Medical Staff Made Aware: Yes  End of Session Communication: Bedside nurse, PCT/NA/CTA  End of Session Patient Position: Bed, 2 rail up, Alarm on    Plan:  Treatment Interventions: ADL retraining, Functional transfer training, UE strengthening/ROM, Endurance training, Patient/family training, Neuromuscular reeducation  OT Frequency: 4 times per week  OT Discharge Recommendations: Moderate intensity level of continued care  OT Recommended Transfer Status: Assist of 1, Minimal assist  OT - OK to Discharge: Yes    Subjective   Previous Visit Info:  OT Last Visit  OT Received On: 05/01/25    General:  General  Reason for Referral: Assess ADLs  Referred By:  "Lee Ann Garnett, APRN-CNP  Past Medical History Relevant to Rehab: Acute URI, osteoporosis, anxiety, OA, CVA, GERD, HTN  Family/Caregiver Present: No  Prior to Session Communication: Bedside nurse, PCT/NA/CTA  Patient Position Received: Up in chair, Alarm on  Preferred Learning Style: kinesthetic, visual  General Comment: Pt seated in chair. States 10/10 pain, \"Iv'e been in the chair too long\". Requesting to get back into bed. RN communicated needing to have pt attempt to urinate on BSC. Pt agreeable prior to return to bed.    Precautions:  Hearing/Visual Limitations: Wear hearing aid/glasses  LE Weight Bearing Status: Weight Bearing as Tolerated  Medical Precautions: Fall precautions     Date/Time Vitals Session Patient Position Pulse Resp SpO2 BP MAP (mmHg)    05/01/25 1343 Post OT  Sitting  56  --  92 %  119/72  --     05/01/25 1359 --  --  --  20  --  119/72  --           Pain Assessment:  Pain Assessment  Pain Assessment: 0-10  0-10 (Numeric) Pain Score: 10 - Worst possible pain  Pain Type: Acute pain  Pain Location: Hip  Pain Orientation: Right  Pain Descriptors: Aching, Discomfort, Pressure, Radiating  Pain Frequency: Constant/continuous  Pain Onset: Progressive  Clinical Progression: Gradually worsening  Pain Interventions: Repositioned  Response to Interventions: No change in pain    Objective   Cognition:  Overall Cognitive Status: Within Functional Limits  Orientation Level: Oriented X4    Activities of Daily Living:   LE Dressing  LE Dressing: Yes  Adult Briefs Level of Assistance: Dependent  LE Dressing Where Assessed: Bedside commode  LE Dressing Comments: total A to doff/maya as pt requiring BUE support on WW for improved balance    Bed Mobility/Transfers:   Bed Mobility  Bed Mobility: Yes  Bed Mobility 1  Bed Mobility 1: Sitting to supine  Level of Assistance 1: Moderate assistance (CGA from additional person at UB/trunk to control)  Bed Mobility Comments 1: LB progression onto bed, trunk " control  Bed Mobility 2  Bed Mobility  2: Scooting  Level of Assistance 2: Close supervision, Moderate verbal cues  Bed Mobility Comments 2: significant struggle and increased time to scoot to L side due to pain    Transfers  Transfer: Yes  Transfer 1  Transfer From 1: Sit to  Transfer to 1: Stand  Technique 1: Sit to stand, Stand to sit  Transfer Device 1: Walker, Gait belt  Transfer Level of Assistance 1: Minimum assistance  Trials/Comments 1: mult trials from varied surfaces (BSC, recliner) consistent SELENE  Transfers 2  Transfer From 2: Chair with arms to  Transfer to 2: Commode-standard  Technique 2: Sit to stand, Stand to sit  Transfer Device 2: Walker, Gait belt  Transfer Level of Assistance 2: Minimum assistance  Trials/Comments 2: 1 trial  Transfers 3  Transfer From 3: Commode-standard to  Transfer to 3: Bed  Technique 3: Sit to stand, Stand to sit  Transfer Device 3: Walker, Gait belt  Transfer Level of Assistance 3: Minimum assistance  Trials/Comments 3: Further cistance to ambulate from commode to bed than prior transfer from chair to commode. initial min A/CGA with cues towards bed, when starting to turn/pivot increased A min/mod as pt not tolerating stand position and attempting to sit prior to reaching EOB. Therapist/aide hands on walker to guide/position.    Toilet Transfers  Toilet Transfer From: Chair  Toilet Transfer Type: To  Toilet Transfer to: Extra wide bedside commode  Toilet Transfer Technique: Ambulating  Toilet Transfers: Minimal assistance    Functional Mobility:  Functional Mobility  Functional Mobility Performed: No    Sitting Balance:  Static Sitting Balance  Static Sitting-Level of Assistance: Distant supervision  Dynamic Sitting Balance  Dynamic Sitting-Level of Assistance: Close supervision, Contact guard    Standing Balance:  Static Standing Balance  Static Standing-Level of Assistance: Contact guard  Dynamic Standing Balance  Dynamic Standing-Level of Assistance: Minimum  assistance    Outcome Measures:  [facility] Daily Activity  Putting on and taking off regular lower body clothing: A lot  Bathing (including washing, rinsing, drying): A lot  Putting on and taking off regular upper body clothing: A little  Toileting, which includes using toilet, bedpan or urinal: A lot  Taking care of personal grooming such as brushing teeth: A little  Eating Meals: None  Daily Activity - Total Score: 16    Education Documentation  Precautions, taught by Mary Shannon OT at 4/30/2025  5:41 PM.  Learner: Family, Patient  Readiness: Acceptance  Method: Explanation, Demonstration  Response: Needs Reinforcement  Comment: OT role/purposeTransfers- safety with ADWB precautions    ADL Training, taught by Mary Shannon OT at 4/30/2025  5:41 PM.  Learner: Family, Patient  Readiness: Acceptance  Method: Explanation, Demonstration  Response: Needs Reinforcement  Comment: OT role/purposeTransfers- safety with ADWB precautions    Education Comments  No comments found.      Goals:  Encounter Problems       Encounter Problems (Active)       ADLs       Patient with complete upper body dressing with supervision level of assistance donning and doffing all UE clothes with PRN adaptive equipment while supported sitting       Start:  04/30/25    Expected End:  05/14/25            Patient with complete lower body dressing with minimal assist  level of assistance donning and doffing all LE clothes  with PRN adaptive equipment while supported sitting       Start:  04/30/25    Expected End:  05/14/25            Patient will complete toileting including hygiene clothing management/hygiene with minimal assist  level of assistance and raised toilet seat and grab bars.       Start:  04/30/25    Expected End:  05/14/25               BALANCE       Pt will maintain dynamic standing balance during ADL task with supervision level of assistance in order to demonstrate decreased risk of falling and improved postural control.  (Progressing)       Start:  04/30/25    Expected End:  05/14/25               COGNITION/SAFETY       Patient will recall and adhere to weight bearing and /or ROM restrictions with all ADL and functional mobility in order to promote healing and safety with functional tasks       Start:  04/30/25    Expected End:  05/14/25               TRANSFERS       Patient will perform bed mobility supervision level of assistance and bed rails, overhead trapeze in order to improve safety and independence with mobility (Progressing)       Start:  04/30/25    Expected End:  05/14/25            Patient will complete sit to stand transfer with supervision level of assistance and least restrictive device in order to improve safety and prepare for out of bed mobility. (Progressing)       Start:  04/30/25    Expected End:  05/14/25

## 2025-05-02 LAB
ANION GAP SERPL CALC-SCNC: <7 MMOL/L (ref 10–20)
BUN SERPL-MCNC: 31 MG/DL (ref 6–23)
CALCIUM SERPL-MCNC: 8.1 MG/DL (ref 8.6–10.3)
CHLORIDE SERPL-SCNC: 106 MMOL/L (ref 98–107)
CO2 SERPL-SCNC: 25 MMOL/L (ref 21–32)
CREAT SERPL-MCNC: 1.36 MG/DL (ref 0.5–1.05)
EGFRCR SERPLBLD CKD-EPI 2021: 39 ML/MIN/1.73M*2
ERYTHROCYTE [DISTWIDTH] IN BLOOD BY AUTOMATED COUNT: 13.3 % (ref 11.5–14.5)
GLUCOSE SERPL-MCNC: 130 MG/DL (ref 74–99)
HCT VFR BLD AUTO: 33.9 % (ref 36–46)
HGB BLD-MCNC: 10.9 G/DL (ref 12–16)
MCH RBC QN AUTO: 32.7 PG (ref 26–34)
MCHC RBC AUTO-ENTMCNC: 32.2 G/DL (ref 32–36)
MCV RBC AUTO: 102 FL (ref 80–100)
NRBC BLD-RTO: 0 /100 WBCS (ref 0–0)
PLATELET # BLD AUTO: 102 X10*3/UL (ref 150–450)
POTASSIUM SERPL-SCNC: 4.8 MMOL/L (ref 3.5–5.3)
RBC # BLD AUTO: 3.33 X10*6/UL (ref 4–5.2)
SODIUM SERPL-SCNC: 130 MMOL/L (ref 136–145)
WBC # BLD AUTO: 4.6 X10*3/UL (ref 4.4–11.3)

## 2025-05-02 PROCEDURE — 2500000001 HC RX 250 WO HCPCS SELF ADMINISTERED DRUGS (ALT 637 FOR MEDICARE OP): Mod: IPSPLIT | Performed by: NURSE PRACTITIONER

## 2025-05-02 PROCEDURE — 2500000002 HC RX 250 W HCPCS SELF ADMINISTERED DRUGS (ALT 637 FOR MEDICARE OP, ALT 636 FOR OP/ED): Mod: IPSPLIT | Performed by: NURSE PRACTITIONER

## 2025-05-02 PROCEDURE — 85027 COMPLETE CBC AUTOMATED: CPT | Mod: IPSPLIT | Performed by: NURSE PRACTITIONER

## 2025-05-02 PROCEDURE — 2500000005 HC RX 250 GENERAL PHARMACY W/O HCPCS: Mod: IPSPLIT | Performed by: NURSE PRACTITIONER

## 2025-05-02 PROCEDURE — 99232 SBSQ HOSP IP/OBS MODERATE 35: CPT | Performed by: NURSE PRACTITIONER

## 2025-05-02 PROCEDURE — 94760 N-INVAS EAR/PLS OXIMETRY 1: CPT | Mod: IPSPLIT

## 2025-05-02 PROCEDURE — 36415 COLL VENOUS BLD VENIPUNCTURE: CPT | Mod: IPSPLIT | Performed by: NURSE PRACTITIONER

## 2025-05-02 PROCEDURE — 1200000002 HC GENERAL ROOM WITH TELEMETRY DAILY: Mod: IPSPLIT

## 2025-05-02 PROCEDURE — 80048 BASIC METABOLIC PNL TOTAL CA: CPT | Mod: IPSPLIT | Performed by: NURSE PRACTITIONER

## 2025-05-02 RX ORDER — MAGNESIUM HYDROXIDE 2400 MG/10ML
10 SUSPENSION ORAL DAILY PRN
Status: DISCONTINUED | OUTPATIENT
Start: 2025-05-02 | End: 2025-05-04 | Stop reason: HOSPADM

## 2025-05-02 RX ORDER — SODIUM CHLORIDE 9 MG/ML
INJECTION, SOLUTION INTRAVENOUS
Status: DISPENSED
Start: 2025-05-02 | End: 2025-05-02

## 2025-05-02 RX ORDER — SENNOSIDES 8.6 MG/1
2 TABLET ORAL DAILY
Status: DISCONTINUED | OUTPATIENT
Start: 2025-05-02 | End: 2025-05-04 | Stop reason: HOSPADM

## 2025-05-02 RX ADMIN — OXYCODONE HYDROCHLORIDE 10 MG: 5 TABLET ORAL at 11:25

## 2025-05-02 RX ADMIN — ASPIRIN 81 MG: 81 TABLET, CHEWABLE ORAL at 20:36

## 2025-05-02 RX ADMIN — ACETAMINOPHEN 975 MG: 325 TABLET ORAL at 14:12

## 2025-05-02 RX ADMIN — Medication 2 L/MIN: at 21:20

## 2025-05-02 RX ADMIN — ACETAMINOPHEN 975 MG: 325 TABLET ORAL at 08:36

## 2025-05-02 RX ADMIN — MAGNESIUM HYDROXIDE 10 ML: 2400 SUSPENSION ORAL at 14:12

## 2025-05-02 RX ADMIN — MELATONIN TAB 3 MG 3 MG: 3 TAB at 20:36

## 2025-05-02 RX ADMIN — ATORVASTATIN CALCIUM 20 MG: 10 TABLET, FILM COATED ORAL at 20:36

## 2025-05-02 RX ADMIN — SENNOSIDES 17.2 MG: 8.6 TABLET, FILM COATED ORAL at 14:12

## 2025-05-02 RX ADMIN — AMOXICILLIN AND CLAVULANATE POTASSIUM 1 TABLET: 500; 125 TABLET, FILM COATED ORAL at 08:37

## 2025-05-02 RX ADMIN — PROPRANOLOL HYDROCHLORIDE 120 MG: 60 CAPSULE, EXTENDED RELEASE ORAL at 20:36

## 2025-05-02 RX ADMIN — AMOXICILLIN AND CLAVULANATE POTASSIUM 1 TABLET: 500; 125 TABLET, FILM COATED ORAL at 20:36

## 2025-05-02 RX ADMIN — BUPROPION HYDROCHLORIDE 150 MG: 150 TABLET, EXTENDED RELEASE ORAL at 20:36

## 2025-05-02 RX ADMIN — ESCITALOPRAM OXALATE 20 MG: 10 TABLET, FILM COATED ORAL at 20:36

## 2025-05-02 RX ADMIN — ACETAMINOPHEN 975 MG: 325 TABLET ORAL at 20:35

## 2025-05-02 RX ADMIN — OXYCODONE 5 MG: 5 TABLET ORAL at 23:04

## 2025-05-02 ASSESSMENT — COGNITIVE AND FUNCTIONAL STATUS - GENERAL
PERSONAL GROOMING: A LITTLE
DAILY ACTIVITIY SCORE: 14
HELP NEEDED FOR BATHING: A LOT
MOVING FROM LYING ON BACK TO SITTING ON SIDE OF FLAT BED WITH BEDRAILS: A LITTLE
DRESSING REGULAR UPPER BODY CLOTHING: A LOT
CLIMB 3 TO 5 STEPS WITH RAILING: TOTAL
DRESSING REGULAR LOWER BODY CLOTHING: A LOT
TURNING FROM BACK TO SIDE WHILE IN FLAT BAD: A LITTLE
EATING MEALS: A LITTLE
TOILETING: A LOT
STANDING UP FROM CHAIR USING ARMS: TOTAL
WALKING IN HOSPITAL ROOM: TOTAL
MOVING TO AND FROM BED TO CHAIR: A LITTLE
MOBILITY SCORE: 12

## 2025-05-02 ASSESSMENT — PAIN DESCRIPTION - LOCATION
LOCATION: BACK
LOCATION: HIP
LOCATION: BACK
LOCATION: HIP

## 2025-05-02 ASSESSMENT — PAIN - FUNCTIONAL ASSESSMENT
PAIN_FUNCTIONAL_ASSESSMENT: 0-10

## 2025-05-02 ASSESSMENT — PAIN DESCRIPTION - DESCRIPTORS
DESCRIPTORS: ACHING;SORE
DESCRIPTORS: ACHING

## 2025-05-02 ASSESSMENT — PAIN DESCRIPTION - ORIENTATION
ORIENTATION: RIGHT
ORIENTATION: LOWER

## 2025-05-02 ASSESSMENT — PAIN SCALES - GENERAL
PAINLEVEL_OUTOF10: 2
PAINLEVEL_OUTOF10: 5 - MODERATE PAIN
PAINLEVEL_OUTOF10: 6
PAINLEVEL_OUTOF10: 10 - WORST POSSIBLE PAIN
PAINLEVEL_OUTOF10: 5 - MODERATE PAIN
PAINLEVEL_OUTOF10: 8

## 2025-05-02 NOTE — CARE PLAN
Patient was in the chair for lunch this shift, requested to get into bed due to being uncomfortable. Patient worked with OT this morning and refused PT in the afternoon due to increase in pain. Patient had good output from angel this shift. Patient doesn't have the best oral fluid intake, staff has been encouraging the fluids. Patient received milk of mag and senokkot to help with constipation. Patient received oxy prn and scheduled tylenol for pain management. Patient is currently in bed, no needs at this time

## 2025-05-02 NOTE — PROGRESS NOTES
05/02/25 1646   Discharge Planning   Living Arrangements Family members;Children   Support Systems Family members;Children   Assistance Needed Pt will go to Swing unit once she is discharged and has authorization from insurance, pending auth andrea now. LSW will check onthe weekend if it has gone through.   Type of Residence Skilled nursing facility   Who is requesting discharge planning? Patient   Expected Discharge Disposition SNF   Patient Choice   Provider Choice list and CMS website (https://medicare.gov/care-compare#search) for post-acute Quality and Resource Measure Data were provided and reviewed with: Patient   Patient / Family choosing to utilize agency / facility established prior to hospitalization No   Stroke Family Assessment   Stroke Family Assessment Needed No   Intensity of Service   Intensity of Service 0-30 min     JENNIFER OLIVAS

## 2025-05-02 NOTE — PROGRESS NOTES
Karina Lee is a 84 y.o. female on day 2 of admission presenting with Syncope and collapse.      Subjective   Karina is seen in her room.   Pain currently controlled.   Patient is non-surgical and expressed that she would not get surgery anyway.        Objective     Last Recorded Vitals  /77 (BP Location: Right arm, Patient Position: Lying)   Pulse 62   Temp 36.5 °C (97.7 °F) (Temporal)   Resp 17   Wt 89.4 kg (197 lb 0.1 oz)   SpO2 90%   Intake/Output last 3 Shifts:    Intake/Output Summary (Last 24 hours) at 5/2/2025 1708  Last data filed at 5/2/2025 1312  Gross per 24 hour   Intake 2213.75 ml   Output 1400 ml   Net 813.75 ml       Admission Weight  Weight: 89.4 kg (197 lb 0.1 oz) (04/29/25 1035)    Daily Weight  04/29/25 : 89.4 kg (197 lb 0.1 oz)    Image Results  XR abdomen 1 view  Narrative: Interpreted By:  Reuben Schaefer,   STUDY:  XR ABDOMEN 1 VIEW;  5/1/2025 4:04 pm      INDICATION:  Signs/Symptoms:urine retention; rule out constipation.      COMPARISON:  06/03/2017      ACCESSION NUMBER(S):  [id number]      ORDERING CLINICIAN:  JAYDON CURRY      FINDINGS:  Fusion changes in the lumbar spine      Left hip arthroplasty      Mild retained stool. No bowel obstruction.      Body habitus does limit assessment.      Impression: 1.  Mild constipation. Body habitus limits assessment      MACRO:  None      Signed by: Reuben Schaefer 5/1/2025 5:26 PM  Dictation workstation:   [id number]  ECG 12 lead  Sinus bradycardia  Left bundle branch block  Abnormal ECG  When compared with ECG of 23-FEB-2023 06:50,  Previous ECG has undetermined rhythm, needs review  T wave amplitude has decreased in Anterior leads  See ED provider note for full interpretation and clinical correlation  Confirmed by Shruthi Fernandez ([id number]) on 5/1/2025 3:44:59 PM    Results for orders placed or performed during the hospital encounter of 04/29/25 (from the past 24 hours)   CBC   Result Value Ref Range    WBC 4.6 4.4 - 11.3  x10*3/uL    nRBC 0.0 0.0 - 0.0 /100 WBCs    RBC 3.33 (L) 4.00 - 5.20 x10*6/uL    Hemoglobin 10.9 (L) 12.0 - 16.0 g/dL    Hematocrit 33.9 (L) 36.0 - 46.0 %     (H) 80 - 100 fL    MCH 32.7 26.0 - 34.0 pg    MCHC 32.2 32.0 - 36.0 g/dL    RDW 13.3 11.5 - 14.5 %    Platelets 102 (L) 150 - 450 x10*3/uL   Basic metabolic panel   Result Value Ref Range    Glucose 130 (H) 74 - 99 mg/dL    Sodium 130 (L) 136 - 145 mmol/L    Potassium 4.8 3.5 - 5.3 mmol/L    Chloride 106 98 - 107 mmol/L    Bicarbonate 25 21 - 32 mmol/L    Anion Gap <7 (L) 10 - 20 mmol/L    Urea Nitrogen 31 (H) 6 - 23 mg/dL    Creatinine 1.36 (H) 0.50 - 1.05 mg/dL    eGFR 39 (L) >60 mL/min/1.73m*2    Calcium 8.1 (L) 8.6 - 10.3 mg/dL     Physical Exam  Vitals reviewed.   Constitutional:       Appearance: Normal appearance. She is obese.   HENT:      Head: Normocephalic and atraumatic.      Right Ear: External ear normal.      Left Ear: External ear normal.      Nose: Congestion and rhinorrhea present.      Mouth/Throat:      Mouth: Mucous membranes are moist.      Pharynx: Oropharynx is clear.   Eyes:      Conjunctiva/sclera: Conjunctivae normal.      Pupils: Pupils are equal, round, and reactive to light.   Cardiovascular:      Rate and Rhythm: Normal rate and regular rhythm.      Pulses: Normal pulses.      Heart sounds: Normal heart sounds.   Pulmonary:      Effort: Pulmonary effort is normal.      Breath sounds: Normal breath sounds.   Abdominal:      General: Bowel sounds are normal.      Palpations: Abdomen is soft.   Musculoskeletal:         General: Swelling and tenderness present.      Cervical back: Normal range of motion and neck supple.      Comments: Limited ROM to right leg   Skin:     General: Skin is warm and dry.      Findings: Bruising present.   Neurological:      General: No focal deficit present.      Mental Status: She is alert and oriented to person, place, and time.   Psychiatric:         Mood and Affect: Mood normal.          Behavior: Behavior normal.     Scheduled medications  Scheduled Medications[1]  Continuous medications  Continuous Medications[2]  PRN medications  PRN Medications[3]      This patient has a urinary catheter   Reason for the urinary catheter remaining today? urinary retention/bladder outlet obstruction, acute or chronic          Assessment & Plan  Syncope and collapse    Benign essential HTN    Anxiety and depression    Dyslipidemia    Closed avulsion fracture of greater trochanter of femur, right, initial encounter    ASHD (arteriosclerotic heart disease)    Nondisplaced fracture of greater trochanter of right femur, initial encounter for closed fracture    Acute recurrent pansinusitis    Right Greater trochanter of femur fracture  Fall at home  - PT/OT evaluation  - WBAT  - Follow up with orthopedic outpatient, non surgical  - started acetaminophen 975 mg daily  - stopped toradol  - oxycodone 5 - 10 mg q6h, prn  - dilaudid 0.5 mg prn breakthrough~discontinue     Acute paranasal sinusitis  - started augmentin 625 mg BID  - started flonase 2 spray daily     Syncope and collapse  ASHD  Dyslipidemia  Essential HTN  - troponin 12 > 11  - continue furosemide 20 mg daily, aspirin 81 mg daily, atorvastatin 20 mg daily  - hold lisinopril 20 mg daily  - refusing furosemide and flonase~discontinued  - monitor BP     Acute on Chronic kidney failure, Stage 3a  Urine retention  - baseline creatine 1.18  - creatine on admission 1.18; creatine today 2.11  - monitor renal function  - renally dose medication  - started 0.9% NS at 75 mL/hr x 1 day  - bladder scanned for > 400 mL   ~likely 2/2 trauma of right hip fracture  - inserted angel catheter  - KUB constipation     Hyponatremia  - Na 132 > 128>130  - add sodium bicarb if continues with hyponatremia     Thrombocytopenia  Normocytic anemia  -  > 126 > 104  - Hb 11.6;   - monitor CBC     Anxiety and depression  - continue bupropion 150 mg daily, escitalopram 20 mg  daily, propranolol 120 mg daily     DVT ppx  - discontinued enoxaparin 30 mg daily  - discontinued fondaparinux 2.5 mg daily     Code status: Full     Disposition: Patient requires more than 2 inpatient days.      Kerri Robin, APRN-CNP           [1] acetaminophen, 975 mg, oral, TID  amoxicillin-clavulanate, 1 tablet, oral, BID  aspirin, 81 mg, oral, Daily  atorvastatin, 20 mg, oral, Daily  buPROPion XL, 150 mg, oral, Daily  escitalopram, 20 mg, oral, Daily  [Held by provider] lisinopril, 20 mg, oral, Daily  melatonin, 3 mg, oral, Nightly  [Held by provider] meloxicam, 15 mg, oral, Daily  propranolol LA, 120 mg, oral, Daily  sennosides, 2 tablet, oral, Daily  [2]    [3] PRN medications: LORazepam, magnesium hydroxide, ondansetron ODT **OR** ondansetron, oxyCODONE, oxyCODONE, oxygen

## 2025-05-02 NOTE — PROGRESS NOTES
Physical Therapy                 Therapy Communication Note    Patient Name: Karina Lee  MRN: [id number]  Department: [facility] 3  Room: [address] [place], [place] [address]  Today's Date: 5/2/2025     Discipline: Physical Therapy    PT Missed Visit: Yes     Missed Visit Reason: Missed Visit Reason: Patient refused (States she just returned to bed and will not agree to participate.)    Missed Time: Attempt    Comment:

## 2025-05-02 NOTE — PROGRESS NOTES
"Occupational Therapy                 Therapy Communication Note    Patient Name: Karina Lee  MRN: [id number]  Department: [facility] 3  Room: [address] [place] [address]A  Today's Date: 5/2/2025     Discipline: Occupational Therapy    OT Missed Visit: Yes     Missed Visit Reason: Missed Visit Reason: Patient refused    Missed Time: Attempt    Comment: Upon entrance, pt supine in bed but awake. \"I am too sleepy for therapy right now.\". Pt just got back into bed. Pt not wanting to participate at this time and requesting to return at a later date. Will follow-up as schedule permits and pt appropriate.  "

## 2025-05-03 LAB
ANION GAP SERPL CALC-SCNC: <7 MMOL/L (ref 10–20)
BUN SERPL-MCNC: 20 MG/DL (ref 6–23)
CALCIUM SERPL-MCNC: 8.9 MG/DL (ref 8.6–10.3)
CHLORIDE SERPL-SCNC: 106 MMOL/L (ref 98–107)
CO2 SERPL-SCNC: 29 MMOL/L (ref 21–32)
CREAT SERPL-MCNC: 1.08 MG/DL (ref 0.5–1.05)
EGFRCR SERPLBLD CKD-EPI 2021: 51 ML/MIN/1.73M*2
ERYTHROCYTE [DISTWIDTH] IN BLOOD BY AUTOMATED COUNT: 13.4 % (ref 11.5–14.5)
GLUCOSE SERPL-MCNC: 110 MG/DL (ref 74–99)
HCT VFR BLD AUTO: 35.4 % (ref 36–46)
HGB BLD-MCNC: 11.4 G/DL (ref 12–16)
MCH RBC QN AUTO: 32.7 PG (ref 26–34)
MCHC RBC AUTO-ENTMCNC: 32.2 G/DL (ref 32–36)
MCV RBC AUTO: 101 FL (ref 80–100)
NRBC BLD-RTO: 0 /100 WBCS (ref 0–0)
PLATELET # BLD AUTO: 124 X10*3/UL (ref 150–450)
POTASSIUM SERPL-SCNC: 5.3 MMOL/L (ref 3.5–5.3)
RBC # BLD AUTO: 3.49 X10*6/UL (ref 4–5.2)
SODIUM SERPL-SCNC: 136 MMOL/L (ref 136–145)
WBC # BLD AUTO: 4 X10*3/UL (ref 4.4–11.3)

## 2025-05-03 PROCEDURE — 94760 N-INVAS EAR/PLS OXIMETRY 1: CPT | Mod: IPSPLIT

## 2025-05-03 PROCEDURE — 2500000004 HC RX 250 GENERAL PHARMACY W/ HCPCS (ALT 636 FOR OP/ED): Mod: TB,IPSPLIT | Performed by: NURSE PRACTITIONER

## 2025-05-03 PROCEDURE — 85027 COMPLETE CBC AUTOMATED: CPT | Mod: IPSPLIT | Performed by: NURSE PRACTITIONER

## 2025-05-03 PROCEDURE — 2500000001 HC RX 250 WO HCPCS SELF ADMINISTERED DRUGS (ALT 637 FOR MEDICARE OP): Mod: IPSPLIT | Performed by: NURSE PRACTITIONER

## 2025-05-03 PROCEDURE — 2500000005 HC RX 250 GENERAL PHARMACY W/O HCPCS: Mod: IPSPLIT | Performed by: NURSE PRACTITIONER

## 2025-05-03 PROCEDURE — 97530 THERAPEUTIC ACTIVITIES: CPT | Mod: GP,CQ,IPSPLIT

## 2025-05-03 PROCEDURE — 1100000001 HC PRIVATE ROOM DAILY: Mod: IPSPLIT

## 2025-05-03 PROCEDURE — 80048 BASIC METABOLIC PNL TOTAL CA: CPT | Mod: IPSPLIT | Performed by: NURSE PRACTITIONER

## 2025-05-03 PROCEDURE — 2500000002 HC RX 250 W HCPCS SELF ADMINISTERED DRUGS (ALT 637 FOR MEDICARE OP, ALT 636 FOR OP/ED): Mod: IPSPLIT | Performed by: NURSE PRACTITIONER

## 2025-05-03 PROCEDURE — 99232 SBSQ HOSP IP/OBS MODERATE 35: CPT | Performed by: NURSE PRACTITIONER

## 2025-05-03 PROCEDURE — 36415 COLL VENOUS BLD VENIPUNCTURE: CPT | Mod: IPSPLIT | Performed by: NURSE PRACTITIONER

## 2025-05-03 RX ORDER — MELOXICAM 7.5 MG/1
7.5 TABLET ORAL
Status: DISCONTINUED | OUTPATIENT
Start: 2025-05-03 | End: 2025-05-04 | Stop reason: HOSPADM

## 2025-05-03 RX ORDER — AMOXICILLIN AND CLAVULANATE POTASSIUM 875; 125 MG/1; MG/1
1 TABLET, FILM COATED ORAL 2 TIMES DAILY
Status: DISCONTINUED | OUTPATIENT
Start: 2025-05-03 | End: 2025-05-04 | Stop reason: HOSPADM

## 2025-05-03 RX ORDER — AMOXICILLIN AND CLAVULANATE POTASSIUM 500; 125 MG/1; MG/1
1 TABLET, FILM COATED ORAL 2 TIMES DAILY
Status: DISCONTINUED | OUTPATIENT
Start: 2025-05-03 | End: 2025-05-03 | Stop reason: ENTERED-IN-ERROR

## 2025-05-03 RX ADMIN — BUPROPION HYDROCHLORIDE 150 MG: 150 TABLET, EXTENDED RELEASE ORAL at 20:41

## 2025-05-03 RX ADMIN — AMOXICILLIN AND CLAVULANATE POTASSIUM 1 TABLET: 500; 125 TABLET, FILM COATED ORAL at 09:11

## 2025-05-03 RX ADMIN — ATORVASTATIN CALCIUM 20 MG: 10 TABLET, FILM COATED ORAL at 20:41

## 2025-05-03 RX ADMIN — MELOXICAM 7.5 MG: 7.5 TABLET ORAL at 12:08

## 2025-05-03 RX ADMIN — OXYCODONE 5 MG: 5 TABLET ORAL at 20:42

## 2025-05-03 RX ADMIN — ESCITALOPRAM OXALATE 20 MG: 10 TABLET, FILM COATED ORAL at 20:41

## 2025-05-03 RX ADMIN — ACETAMINOPHEN 975 MG: 325 TABLET ORAL at 20:42

## 2025-05-03 RX ADMIN — HYDROMORPHONE HYDROCHLORIDE 0.2 MG: 1 INJECTION, SOLUTION INTRAMUSCULAR; INTRAVENOUS; SUBCUTANEOUS at 12:07

## 2025-05-03 RX ADMIN — MELATONIN TAB 3 MG 3 MG: 3 TAB at 20:41

## 2025-05-03 RX ADMIN — ASPIRIN 81 MG: 81 TABLET, CHEWABLE ORAL at 20:41

## 2025-05-03 RX ADMIN — AMOXICILLIN AND CLAVULANATE POTASSIUM 1 TABLET: 875; 125 TABLET, FILM COATED ORAL at 20:41

## 2025-05-03 RX ADMIN — ACETAMINOPHEN 975 MG: 325 TABLET ORAL at 09:11

## 2025-05-03 RX ADMIN — SENNOSIDES 17.2 MG: 8.6 TABLET, FILM COATED ORAL at 09:11

## 2025-05-03 RX ADMIN — Medication 2 L/MIN: at 09:50

## 2025-05-03 RX ADMIN — ACETAMINOPHEN 975 MG: 325 TABLET ORAL at 15:07

## 2025-05-03 RX ADMIN — PROPRANOLOL HYDROCHLORIDE 120 MG: 60 CAPSULE, EXTENDED RELEASE ORAL at 20:41

## 2025-05-03 RX ADMIN — OXYCODONE 5 MG: 5 TABLET ORAL at 09:11

## 2025-05-03 ASSESSMENT — COGNITIVE AND FUNCTIONAL STATUS - GENERAL
DAILY ACTIVITIY SCORE: 14
HELP NEEDED FOR BATHING: A LOT
TURNING FROM BACK TO SIDE WHILE IN FLAT BAD: A LOT
MOVING TO AND FROM BED TO CHAIR: A LITTLE
MOBILITY SCORE: 11
TOILETING: A LOT
MOBILITY SCORE: 13
EATING MEALS: A LITTLE
DRESSING REGULAR LOWER BODY CLOTHING: A LOT
STANDING UP FROM CHAIR USING ARMS: A LOT
DRESSING REGULAR UPPER BODY CLOTHING: A LOT
WALKING IN HOSPITAL ROOM: A LOT
MOVING TO AND FROM BED TO CHAIR: A LOT
CLIMB 3 TO 5 STEPS WITH RAILING: TOTAL
MOVING FROM LYING ON BACK TO SITTING ON SIDE OF FLAT BED WITH BEDRAILS: A LOT
TURNING FROM BACK TO SIDE WHILE IN FLAT BAD: A LITTLE
CLIMB 3 TO 5 STEPS WITH RAILING: TOTAL
STANDING UP FROM CHAIR USING ARMS: A LOT
WALKING IN HOSPITAL ROOM: TOTAL
PERSONAL GROOMING: A LITTLE
MOVING FROM LYING ON BACK TO SITTING ON SIDE OF FLAT BED WITH BEDRAILS: A LITTLE

## 2025-05-03 ASSESSMENT — PAIN SCALES - GENERAL
PAINLEVEL_OUTOF10: 0 - NO PAIN
PAINLEVEL_OUTOF10: 10 - WORST POSSIBLE PAIN
PAINLEVEL_OUTOF10: 6
PAINLEVEL_OUTOF10: 5 - MODERATE PAIN
PAINLEVEL_OUTOF10: 8
PAINLEVEL_OUTOF10: 0 - NO PAIN

## 2025-05-03 ASSESSMENT — PAIN DESCRIPTION - LOCATION: LOCATION: BACK

## 2025-05-03 ASSESSMENT — PAIN DESCRIPTION - DESCRIPTORS: DESCRIPTORS: ACHING;SORE

## 2025-05-03 ASSESSMENT — PAIN DESCRIPTION - ORIENTATION: ORIENTATION: LOWER

## 2025-05-03 NOTE — ACP (ADVANCE CARE PLANNING)
Confirming Previous Code Status:   Advance Care Planning Note     Discussion Date: 05/03/25   Discussion Participants: patient and daughter    The patient wishes to discuss Advance Care Planning today and the following is a brief summary of our discussion.     Patient has capacity to make their own medical decisions: Yes  Health Care Agent/Surrogate Decision Maker documented in chart: Yes    Documents on file and valid:  Advance Directive/Living Will: Yes   Health Care Power of : Yes  Other: able to make own decisions    Communication of Medical Status/Prognosis:   Patient does not want CPR or intubation.        Communication of Treatment Goals/Options:   Treat treatable conditions     Treatment Decisions  Goals of Care: quality of life is prioritized; willing to accept low-burden treatments to achieve meaningful goals     Follow Up Plan    Team Members    Time Statement: Total face to face time spent on advance care planning was 10 minutes with 5 minutes spent in counseling, including the explanation.    WILLA Agudelo-CNP  5/3/2025 3:41 PM

## 2025-05-03 NOTE — PROGRESS NOTES
Karina Lee is a 84 y.o. female on day 3 of admission presenting with Syncope and collapse.      Subjective   Karina is seen in her room in a recliner chair.   She appears to be in pain and said the pain was 10/10 but just received pain medication.   Discussed with nursing to notify author if pain not adequately relieved.        Objective     Last Recorded Vitals  /81 (BP Location: Right arm, Patient Position: Sitting)   Pulse 56   Temp 36.6 °C (97.9 °F) (Temporal)   Resp 19   Wt 89.4 kg (197 lb 0.1 oz)   SpO2 91%   Intake/Output last 3 Shifts:    Intake/Output Summary (Last 24 hours) at 5/3/2025 1113  Last data filed at 5/3/2025 0444  Gross per 24 hour   Intake 2093.75 ml   Output 1750 ml   Net 343.75 ml       Admission Weight  Weight: 89.4 kg (197 lb 0.1 oz) (04/29/25 1035)    Daily Weight  04/29/25 : 89.4 kg (197 lb 0.1 oz)    Image Results  XR abdomen 1 view  Narrative: Interpreted By:  Reuben Schaefer,   STUDY:  XR ABDOMEN 1 VIEW;  5/1/2025 4:04 pm      INDICATION:  Signs/Symptoms:urine retention; rule out constipation.      COMPARISON:  06/03/2017      ACCESSION NUMBER(S):  [id number]      ORDERING CLINICIAN:  JAYDON CURRY      FINDINGS:  Fusion changes in the lumbar spine      Left hip arthroplasty      Mild retained stool. No bowel obstruction.      Body habitus does limit assessment.      Impression: 1.  Mild constipation. Body habitus limits assessment      MACRO:  None      Signed by: Reuben Schaefer 5/1/2025 5:26 PM  Dictation workstation:   [id number]  ECG 12 lead  Sinus bradycardia  Left bundle branch block  Abnormal ECG  When compared with ECG of 23-FEB-2023 06:50,  Previous ECG has undetermined rhythm, needs review  T wave amplitude has decreased in Anterior leads  See ED provider note for full interpretation and clinical correlation  Confirmed by Shruthi Fernandez ([id number]) on 5/1/2025 3:44:59 PM    Results for orders placed or performed during the hospital encounter of 04/29/25 (from  the past 24 hours)   Basic Metabolic Panel   Result Value Ref Range    Glucose 110 (H) 74 - 99 mg/dL    Sodium 136 136 - 145 mmol/L    Potassium 5.3 3.5 - 5.3 mmol/L    Chloride 106 98 - 107 mmol/L    Bicarbonate 29 21 - 32 mmol/L    Anion Gap <7 (L) 10 - 20 mmol/L    Urea Nitrogen 20 6 - 23 mg/dL    Creatinine 1.08 (H) 0.50 - 1.05 mg/dL    eGFR 51 (L) >60 mL/min/1.73m*2    Calcium 8.9 8.6 - 10.3 mg/dL   CBC   Result Value Ref Range    WBC 4.0 (L) 4.4 - 11.3 x10*3/uL    nRBC 0.0 0.0 - 0.0 /100 WBCs    RBC 3.49 (L) 4.00 - 5.20 x10*6/uL    Hemoglobin 11.4 (L) 12.0 - 16.0 g/dL    Hematocrit 35.4 (L) 36.0 - 46.0 %     (H) 80 - 100 fL    MCH 32.7 26.0 - 34.0 pg    MCHC 32.2 32.0 - 36.0 g/dL    RDW 13.4 11.5 - 14.5 %    Platelets 124 (L) 150 - 450 x10*3/uL     Physical Exam  Vitals reviewed.   Constitutional:       Appearance: Normal appearance. She is obese.   HENT:      Head: Normocephalic and atraumatic.      Right Ear: External ear normal.      Left Ear: External ear normal.      Nose: clear     Mouth/Throat: clear     Mouth: Mucous membranes are moist.      Pharynx: Oropharynx is clear.   Eyes:      Conjunctiva/sclera: Conjunctivae normal.      Pupils: Pupils are equal, round, and reactive to light.   Cardiovascular:      Rate and Rhythm: Normal rate and regular rhythm.      Pulses: Normal pulses.      Heart sounds: Normal heart sounds.   Pulmonary:      Effort: Pulmonary effort is normal.      Breath sounds: Normal breath sounds.   Abdominal:      General: Bowel sounds are normal.      Palpations: Abdomen is soft.   Musculoskeletal:         General: Swelling and tenderness present. Right hip.     Cervical back: Normal range of motion and neck supple.      Comments: Limited ROM to right leg   Skin:     General: Skin is warm and dry.      Findings: Bruising present.   Neurological:      General: No focal deficit present.      Mental Status: She is alert and oriented to person, place, and time.   Psychiatric:          Mood and Affect: Mood normal.         Behavior: Behavior normal.     Scheduled medications  Scheduled Medications[1]  Continuous medications  Continuous Medications[2]  PRN medications  PRN Medications[3]    Assessment & Plan  Syncope and collapse    Benign essential HTN    Anxiety and depression    Dyslipidemia    Closed avulsion fracture of greater trochanter of femur, right, initial encounter    ASHD (arteriosclerotic heart disease)    Nondisplaced fracture of greater trochanter of right femur, initial encounter for closed fracture    Acute recurrent pansinusitis    Right Greater trochanter of femur fracture  Fall at home  - PT/OT evaluation  - WBAT  - Follow up with orthopedic outpatient, non surgical  - started acetaminophen 975 mg daily  - stopped toradol  - oxycodone 5 - 10 mg q6h, prn  - dilaudid 0.5 mg prn breakthrough~discontinue     Acute paranasal sinusitis  - started augmentin 625 mg BID  - started flonase 2 spray daily     Syncope and collapse  ASHD  Dyslipidemia  Essential HTN  - troponin 12 > 11  - continue furosemide 20 mg daily, aspirin 81 mg daily, atorvastatin 20 mg daily  - hold lisinopril 20 mg daily  - refusing furosemide and flonase~discontinued  - monitor BP     Acute on Chronic kidney failure, Stage 3a-resolved  Urine retention  - baseline creatine 1.18  - creatine on admission 1.18; creatine today 1.08  - monitor renal function  - renally dose medication  - started 0.9% NS at 75 mL/hr x 1 day  - bladder scanned for > 400 mL              ~likely 2/2 trauma of right hip fracture  - inserted angel catheter  - KUB constipation     Hyponatremia  - Na 132 > 128>130>136  - add sodium bicarb if continues with hyponatremia     Thrombocytopenia  Normocytic anemia  -  > 126 > 104  - Hb 11.6;   - monitor CBC     Anxiety and depression  - continue bupropion 150 mg daily, escitalopram 20 mg daily, propranolol 120 mg daily     DVT ppx  - discontinued enoxaparin 30 mg daily  -  discontinued fondaparinux 2.5 mg daily     Code status: Full     Disposition: Patient requires more than 2 inpatient days.   ~awaiting placement.       Kerri Robin, APRN-CNP           [1] acetaminophen, 975 mg, oral, TID  amoxicillin-clavulanate, 1 tablet, oral, BID  aspirin, 81 mg, oral, Daily  atorvastatin, 20 mg, oral, Daily  buPROPion XL, 150 mg, oral, Daily  escitalopram, 20 mg, oral, Daily  melatonin, 3 mg, oral, Nightly  propranolol LA, 120 mg, oral, Daily  sennosides, 2 tablet, oral, Daily  [2]    [3] PRN medications: LORazepam, magnesium hydroxide, ondansetron ODT **OR** ondansetron, oxyCODONE, oxyCODONE, oxygen

## 2025-05-03 NOTE — CARE PLAN
The patient's goals for the shift include      The clinical goals for the shift include Pt will remain hemodynamically stable this shift    Over the shift, the patient had an uneventful shift  VSS  SPO2 > 94% on supplemental oxygen HS  Medicated for c/o low back and right hip pain with good effect  Donovan catheter draining QS  Repositions well in bed  Call light in reach  Safety measures maintained

## 2025-05-03 NOTE — PROGRESS NOTES
Physical Therapy    Physical Therapy Treatment    Patient Name: Karina Lee  MRN: [id number]  Department: [facility]  Room: [address] [place] [address]  Today's Date: 5/3/2025  Time Calculation  Start Time: 0842  Stop Time: 0908  Time Calculation (min): 26 min         Assessment/Plan   PT Assessment  PT Assessment Results: Decreased strength, Decreased range of motion, Decreased endurance, Impaired balance, Decreased mobility, Pain, Impaired hearing, Impaired vision  Rehab Prognosis: Good  Barriers to Discharge Home: Physical needs  Physical Needs: Ambulating household distances limited by function/safety, Intermittent mobility assistance needed, High falls risk due to function or environment  Evaluation/Treatment Tolerance: Patient limited by pain, Patient limited by fatigue  End of Session Communication: Bedside nurse  Assessment Comment: Patient limited by high levels of pain, overall movement very slow.  End of Session Patient Position: Up in chair, Alarm on     PT Plan  Treatment/Interventions: Bed mobility, Transfer training, Gait training, Stair training, Balance training, Neuromuscular re-education, Therapeutic exercise, Therapeutic activity, Strengthening, Home exercise program  PT Plan: Ongoing PT  PT Frequency: 4 times per week  PT Discharge Recommendations: Low intensity level of continued care  Equipment Recommended upon Discharge: Wheeled walker (2ww)  PT Recommended Transfer Status: Assist x1  PT - OK to Discharge: Yes      General Visit Information:   PT  Visit  PT Received On: 05/03/25  General  Referred By: WILLA Fitzpatrick-CNP  Past Medical History Relevant to Rehab: Acute URI, osteoporosis, anxiety, OA, CVA, GERD, HTN  Prior to Session Communication: Bedside nurse  Patient Position Received: Up in chair, Alarm on  General Comment: Patient pleasant and agreeable to work with therapy, but reporting high levels of pain while laying supine in bed    Subjective   Precautions:  Precautions  Hearing/Visual Limitations:  Wear hearing aid/glasses  LE Weight Bearing Status: Weight Bearing as Tolerated  Medical Precautions: Fall precautions  Precautions Comment: R hip fx non operable     Date/Time Vitals Session Patient Position Pulse Resp SpO2 BP MAP (mmHg)    05/03/25 0838 --  --  57  19  95 %  177/81  113     05/03/25 0842 During PT  --  56  --  90 %  --  --     05/03/25 0948 --  --  --  --  85 %  --  --     05/03/25 0950 --  --  --  --  91 %  --  --                 Objective   Pain:  Pain Assessment  Pain Assessment: 0-10  0-10 (Numeric) Pain Score: 10 - Worst possible pain  Pain Type: Acute pain  Pain Location: Hip  Pain Orientation: Right  Pain Interventions: Cold applied  Cognition:  Cognition  Overall Cognitive Status: Within Functional Limits    Activity Tolerance:  Activity Tolerance  Endurance: Tolerates 10 - 20 min exercise with multiple rests  Treatments:  Bed Mobility  Bed Mobility: Yes  Bed Mobility 1  Bed Mobility 1: Supine to sitting  Level of Assistance 1: Moderate assistance, Minimal verbal cues (cues for sequencing)  Bed Mobility Comments 1: head of bed elevated ~45 degrees. required encouragement to attempt movement of RLE    Transfers  Transfer: Yes  Transfer 1  Transfer From 1: Bed to  Transfer to 1: Stand  Technique 1: Sit to stand  Transfer Device 1: Walker, Gait belt  Transfer Level of Assistance 1: Moderate assistance, Minimal verbal cues (cuing for correct hand placement)  Trials/Comments 1: mult trials performed  Transfers 2  Transfer From 2: Stand to  Transfer to 2: Chair with arms  Technique 2: Stand pivot  Transfer Device 2: Walker, Gait belt  Transfer Level of Assistance 2: Minimum assistance, Minimal verbal cues (cues for sequencing)  Trials/Comments 2: extended time to perform pivot d/t slow movements    Outcome Measures:  [facility] Basic Mobility  Turning from your back to your side while in a flat bed without using bedrails: A lot  Moving from lying on your back to sitting on the side of a flat bed  without using bedrails: A lot  Moving to and from bed to chair (including a wheelchair): A lot  Standing up from a chair using your arms (e.g. wheelchair or bedside chair): A lot  To walk in hospital room: A lot  Climbing 3-5 steps with railing: Total  Basic Mobility - Total Score: 11      Encounter Problems       Encounter Problems (Active)       Balance       LTG - Patient will maintain good static balance to allow for safe mobility (Progressing)       Start:  04/30/25    Expected End:  05/14/25               Mobility       LTG - Patient will be able to go up and down a curb/step with 2ww and KALEB  (Progressing)       Start:  04/30/25    Expected End:  05/14/25            LTG - Patient will ambulate household distance KALEB with 2ww (Progressing)       Start:  04/30/25    Expected End:  05/14/25               Pain - Adult

## 2025-05-03 NOTE — CARE PLAN
The patient's goals for the shift include      The clinical goals for the shift include Patient will participate with therapies through 1900    Patient with uneventful shift. 2 person stand pivot. Was up in chair for breakfast and lunch. Medicated for pain per MAR. Participated with therapy today. Donovan patent and draining. Patient auth excepted. Patient to move to swing unit in morning. Patient resting in bed, call button in reach.

## 2025-05-04 ENCOUNTER — HOSPITAL ENCOUNTER (INPATIENT)
Facility: HOSPITAL | Age: 85
DRG: 560 | End: 2025-05-04
Attending: INTERNAL MEDICINE | Admitting: INTERNAL MEDICINE
Payer: MEDICARE

## 2025-05-04 VITALS
WEIGHT: 197 LBS | OXYGEN SATURATION: 96 % | RESPIRATION RATE: 19 BRPM | TEMPERATURE: 98.6 F | BODY MASS INDEX: 31.66 KG/M2 | HEIGHT: 66 IN | DIASTOLIC BLOOD PRESSURE: 82 MMHG | SYSTOLIC BLOOD PRESSURE: 184 MMHG | HEART RATE: 59 BPM

## 2025-05-04 VITALS
DIASTOLIC BLOOD PRESSURE: 74 MMHG | HEART RATE: 59 BPM | SYSTOLIC BLOOD PRESSURE: 175 MMHG | RESPIRATION RATE: 16 BRPM | TEMPERATURE: 97.4 F | BODY MASS INDEX: 31.66 KG/M2 | OXYGEN SATURATION: 94 % | HEIGHT: 66 IN | WEIGHT: 197 LBS

## 2025-05-04 DIAGNOSIS — S72.001S CLOSED RIGHT HIP FRACTURE, SEQUELA: Primary | ICD-10-CM

## 2025-05-04 PROBLEM — R55 SYNCOPE AND COLLAPSE: Status: RESOLVED | Noted: 2025-04-29 | Resolved: 2025-05-04

## 2025-05-04 PROBLEM — J01.41 ACUTE RECURRENT PANSINUSITIS: Status: RESOLVED | Noted: 2025-04-30 | Resolved: 2025-05-04

## 2025-05-04 LAB
ANION GAP SERPL CALC-SCNC: 7 MMOL/L (ref 10–20)
BNP SERPL-MCNC: 192 PG/ML (ref 0–99)
BUN SERPL-MCNC: 19 MG/DL (ref 6–23)
CALCIUM SERPL-MCNC: 9.1 MG/DL (ref 8.6–10.3)
CHLORIDE SERPL-SCNC: 104 MMOL/L (ref 98–107)
CO2 SERPL-SCNC: 29 MMOL/L (ref 21–32)
CREAT SERPL-MCNC: 1.03 MG/DL (ref 0.5–1.05)
EGFRCR SERPLBLD CKD-EPI 2021: 54 ML/MIN/1.73M*2
ERYTHROCYTE [DISTWIDTH] IN BLOOD BY AUTOMATED COUNT: 13.2 % (ref 11.5–14.5)
GLUCOSE SERPL-MCNC: 117 MG/DL (ref 74–99)
HCT VFR BLD AUTO: 35 % (ref 36–46)
HGB BLD-MCNC: 11.3 G/DL (ref 12–16)
MCH RBC QN AUTO: 32.6 PG (ref 26–34)
MCHC RBC AUTO-ENTMCNC: 32.3 G/DL (ref 32–36)
MCV RBC AUTO: 101 FL (ref 80–100)
NRBC BLD-RTO: 0 /100 WBCS (ref 0–0)
PLATELET # BLD AUTO: 130 X10*3/UL (ref 150–450)
POTASSIUM SERPL-SCNC: 5.1 MMOL/L (ref 3.5–5.3)
RBC # BLD AUTO: 3.47 X10*6/UL (ref 4–5.2)
SODIUM SERPL-SCNC: 135 MMOL/L (ref 136–145)
WBC # BLD AUTO: 4 X10*3/UL (ref 4.4–11.3)

## 2025-05-04 PROCEDURE — 2500000005 HC RX 250 GENERAL PHARMACY W/O HCPCS: Mod: IPSPLIT | Performed by: NURSE PRACTITIONER

## 2025-05-04 PROCEDURE — 36415 COLL VENOUS BLD VENIPUNCTURE: CPT | Mod: IPSPLIT | Performed by: NURSE PRACTITIONER

## 2025-05-04 PROCEDURE — 83880 ASSAY OF NATRIURETIC PEPTIDE: CPT | Mod: IPSPLIT | Performed by: NURSE PRACTITIONER

## 2025-05-04 PROCEDURE — 80048 BASIC METABOLIC PNL TOTAL CA: CPT | Mod: IPSPLIT | Performed by: NURSE PRACTITIONER

## 2025-05-04 PROCEDURE — 2500000001 HC RX 250 WO HCPCS SELF ADMINISTERED DRUGS (ALT 637 FOR MEDICARE OP): Mod: IPSPLIT | Performed by: NURSE PRACTITIONER

## 2025-05-04 PROCEDURE — 85027 COMPLETE CBC AUTOMATED: CPT | Mod: IPSPLIT | Performed by: NURSE PRACTITIONER

## 2025-05-04 PROCEDURE — 94760 N-INVAS EAR/PLS OXIMETRY 1: CPT | Mod: IPSPLIT

## 2025-05-04 PROCEDURE — 99238 HOSP IP/OBS DSCHRG MGMT 30/<: CPT | Performed by: NURSE PRACTITIONER

## 2025-05-04 PROCEDURE — 1900000001 HC SKILLED SWING ROOM: Mod: IPSPLIT

## 2025-05-04 PROCEDURE — 2500000004 HC RX 250 GENERAL PHARMACY W/ HCPCS (ALT 636 FOR OP/ED): Mod: JZ,IPSPLIT | Performed by: NURSE PRACTITIONER

## 2025-05-04 PROCEDURE — 2500000002 HC RX 250 W HCPCS SELF ADMINISTERED DRUGS (ALT 637 FOR MEDICARE OP, ALT 636 FOR OP/ED): Mod: IPSPLIT | Performed by: NURSE PRACTITIONER

## 2025-05-04 PROCEDURE — 99304 1ST NF CARE SF/LOW MDM 25: CPT | Performed by: NURSE PRACTITIONER

## 2025-05-04 RX ORDER — NAPROXEN SODIUM 220 MG/1
81 TABLET, FILM COATED ORAL DAILY
Status: DISPENSED | OUTPATIENT
Start: 2025-05-04

## 2025-05-04 RX ORDER — ATORVASTATIN CALCIUM 10 MG/1
20 TABLET, FILM COATED ORAL DAILY
Status: DISPENSED | OUTPATIENT
Start: 2025-05-04

## 2025-05-04 RX ORDER — MAGNESIUM HYDROXIDE 2400 MG/10ML
10 SUSPENSION ORAL DAILY PRN
Status: ACTIVE | OUTPATIENT
Start: 2025-05-04

## 2025-05-04 RX ORDER — SENNOSIDES 8.6 MG/1
2 TABLET ORAL DAILY
Status: ON HOLD
Start: 2025-05-05

## 2025-05-04 RX ORDER — LISINOPRIL 20 MG/1
20 TABLET ORAL DAILY
Status: DISPENSED | OUTPATIENT
Start: 2025-05-04

## 2025-05-04 RX ORDER — OXYCODONE HYDROCHLORIDE 10 MG/1
10 TABLET ORAL EVERY 6 HOURS PRN
Qty: 15 TABLET | Refills: 0 | Status: ON HOLD | OUTPATIENT
Start: 2025-05-04

## 2025-05-04 RX ORDER — MAGNESIUM HYDROXIDE 2400 MG/10ML
10 SUSPENSION ORAL DAILY PRN
Status: ON HOLD
Start: 2025-05-04

## 2025-05-04 RX ORDER — ONDANSETRON 4 MG/1
4 TABLET, ORALLY DISINTEGRATING ORAL EVERY 8 HOURS PRN
Status: ON HOLD
Start: 2025-05-04

## 2025-05-04 RX ORDER — AMOXICILLIN AND CLAVULANATE POTASSIUM 875; 125 MG/1; MG/1
1 TABLET, FILM COATED ORAL 2 TIMES DAILY
Status: ON HOLD
Start: 2025-05-04 | End: 2025-05-07

## 2025-05-04 RX ORDER — SENNOSIDES 8.6 MG/1
2 TABLET ORAL DAILY
Status: ACTIVE | OUTPATIENT
Start: 2025-05-05

## 2025-05-04 RX ORDER — FUROSEMIDE 20 MG/1
20 TABLET ORAL DAILY
Status: DISPENSED | OUTPATIENT
Start: 2025-05-04

## 2025-05-04 RX ORDER — ESCITALOPRAM OXALATE 10 MG/1
20 TABLET ORAL DAILY
Status: DISPENSED | OUTPATIENT
Start: 2025-05-04

## 2025-05-04 RX ORDER — PROPRANOLOL HYDROCHLORIDE 60 MG/1
120 CAPSULE, EXTENDED RELEASE ORAL DAILY
Status: DISCONTINUED | OUTPATIENT
Start: 2025-05-04 | End: 2025-05-04

## 2025-05-04 RX ORDER — ENOXAPARIN SODIUM 100 MG/ML
40 INJECTION SUBCUTANEOUS DAILY
Status: DISPENSED | OUTPATIENT
Start: 2025-05-04

## 2025-05-04 RX ORDER — TALC
3 POWDER (GRAM) TOPICAL NIGHTLY
Status: ON HOLD
Start: 2025-05-04

## 2025-05-04 RX ORDER — ONDANSETRON 4 MG/1
4 TABLET, ORALLY DISINTEGRATING ORAL EVERY 8 HOURS PRN
Status: ACTIVE | OUTPATIENT
Start: 2025-05-04

## 2025-05-04 RX ORDER — OXYCODONE HYDROCHLORIDE 5 MG/1
5 TABLET ORAL EVERY 6 HOURS PRN
Status: DISPENSED | OUTPATIENT
Start: 2025-05-04

## 2025-05-04 RX ORDER — OXYCODONE HYDROCHLORIDE 5 MG/1
5 TABLET ORAL EVERY 6 HOURS PRN
Status: ON HOLD
Start: 2025-05-04

## 2025-05-04 RX ORDER — AMOXICILLIN AND CLAVULANATE POTASSIUM 875; 125 MG/1; MG/1
1 TABLET, FILM COATED ORAL 2 TIMES DAILY
Status: DISPENSED | OUTPATIENT
Start: 2025-05-04

## 2025-05-04 RX ORDER — PROPRANOLOL HYDROCHLORIDE 60 MG/1
120 CAPSULE, EXTENDED RELEASE ORAL DAILY
Status: DISPENSED | OUTPATIENT
Start: 2025-05-04

## 2025-05-04 RX ORDER — OXYCODONE HYDROCHLORIDE 5 MG/1
10 TABLET ORAL EVERY 6 HOURS PRN
Status: ACTIVE | OUTPATIENT
Start: 2025-05-04

## 2025-05-04 RX ORDER — BUPROPION HYDROCHLORIDE 150 MG/1
150 TABLET ORAL DAILY
Status: DISPENSED | OUTPATIENT
Start: 2025-05-04

## 2025-05-04 RX ORDER — TALC
3 POWDER (GRAM) TOPICAL NIGHTLY
Status: DISPENSED | OUTPATIENT
Start: 2025-05-04

## 2025-05-04 RX ORDER — MELOXICAM 7.5 MG/1
15 TABLET ORAL DAILY
Status: ACTIVE | OUTPATIENT
Start: 2025-05-05

## 2025-05-04 RX ADMIN — Medication 2 L/MIN: at 08:15

## 2025-05-04 RX ADMIN — OXYCODONE HYDROCHLORIDE 5 MG: 5 TABLET ORAL at 17:34

## 2025-05-04 RX ADMIN — ENOXAPARIN SODIUM 40 MG: 40 INJECTION SUBCUTANEOUS at 17:22

## 2025-05-04 RX ADMIN — SENNOSIDES 17.2 MG: 8.6 TABLET, FILM COATED ORAL at 08:32

## 2025-05-04 RX ADMIN — AMOXICILLIN AND CLAVULANATE POTASSIUM 1 TABLET: 875; 125 TABLET, FILM COATED ORAL at 20:11

## 2025-05-04 RX ADMIN — MELATONIN TAB 3 MG 3 MG: 3 TAB at 20:11

## 2025-05-04 RX ADMIN — BUPROPION HYDROCHLORIDE 150 MG: 150 TABLET, EXTENDED RELEASE ORAL at 20:11

## 2025-05-04 RX ADMIN — LISINOPRIL 20 MG: 20 TABLET ORAL at 14:19

## 2025-05-04 RX ADMIN — AMOXICILLIN AND CLAVULANATE POTASSIUM 1 TABLET: 875; 125 TABLET, FILM COATED ORAL at 08:32

## 2025-05-04 RX ADMIN — ACETAMINOPHEN 975 MG: 325 TABLET ORAL at 08:32

## 2025-05-04 RX ADMIN — ATORVASTATIN CALCIUM 20 MG: 10 TABLET, FILM COATED ORAL at 20:11

## 2025-05-04 RX ADMIN — MAGNESIUM HYDROXIDE 10 ML: 2400 SUSPENSION ORAL at 08:32

## 2025-05-04 RX ADMIN — ASPIRIN 81 MG: 81 TABLET, CHEWABLE ORAL at 20:11

## 2025-05-04 RX ADMIN — FUROSEMIDE 20 MG: 20 TABLET ORAL at 14:19

## 2025-05-04 RX ADMIN — PROPRANOLOL HYDROCHLORIDE 120 MG: 60 CAPSULE, EXTENDED RELEASE ORAL at 14:50

## 2025-05-04 RX ADMIN — MELOXICAM 7.5 MG: 7.5 TABLET ORAL at 08:32

## 2025-05-04 RX ADMIN — ESCITALOPRAM OXALATE 20 MG: 10 TABLET, FILM COATED ORAL at 20:11

## 2025-05-04 SDOH — SOCIAL STABILITY: SOCIAL INSECURITY: ABUSE: ADULT

## 2025-05-04 SDOH — SOCIAL STABILITY: SOCIAL INSECURITY: HAVE YOU HAD ANY THOUGHTS OF HARMING ANYONE ELSE?: NO

## 2025-05-04 SDOH — HEALTH STABILITY: PHYSICAL HEALTH
HOW OFTEN DO YOU NEED TO HAVE SOMEONE HELP YOU WHEN YOU READ INSTRUCTIONS, PAMPHLETS, OR OTHER WRITTEN MATERIAL FROM YOUR DOCTOR OR PHARMACY?: OFTEN

## 2025-05-04 SDOH — SOCIAL STABILITY: SOCIAL NETWORK: HOW OFTEN DO YOU ATTEND MEETINGS OF THE CLUBS OR ORGANIZATIONS YOU BELONG TO?: NEVER

## 2025-05-04 SDOH — ECONOMIC STABILITY: FOOD INSECURITY: WITHIN THE PAST 12 MONTHS, YOU WORRIED THAT YOUR FOOD WOULD RUN OUT BEFORE YOU GOT THE MONEY TO BUY MORE.: NEVER TRUE

## 2025-05-04 SDOH — SOCIAL STABILITY: SOCIAL NETWORK: HOW OFTEN DO YOU ATTEND CHURCH OR RELIGIOUS SERVICES?: NEVER

## 2025-05-04 SDOH — ECONOMIC STABILITY: FOOD INSECURITY: WITHIN THE PAST 12 MONTHS, THE FOOD YOU BOUGHT JUST DIDN'T LAST AND YOU DIDN'T HAVE MONEY TO GET MORE.: NEVER TRUE

## 2025-05-04 SDOH — SOCIAL STABILITY: SOCIAL NETWORK
IN A TYPICAL WEEK, HOW MANY TIMES DO YOU TALK ON THE PHONE WITH FAMILY, FRIENDS, OR NEIGHBORS?: MORE THAN THREE TIMES A WEEK

## 2025-05-04 SDOH — SOCIAL STABILITY: SOCIAL INSECURITY: ARE YOU MARRIED, WIDOWED, DIVORCED, SEPARATED, NEVER MARRIED, OR LIVING WITH A PARTNER?: WIDOWED

## 2025-05-04 SDOH — ECONOMIC STABILITY: INCOME INSECURITY: IN THE PAST 12 MONTHS HAS THE ELECTRIC, GAS, OIL, OR WATER COMPANY THREATENED TO SHUT OFF SERVICES IN YOUR HOME?: NO

## 2025-05-04 SDOH — SOCIAL STABILITY: SOCIAL INSECURITY: WITHIN THE LAST YEAR, HAVE YOU BEEN HUMILIATED OR EMOTIONALLY ABUSED IN OTHER WAYS BY YOUR PARTNER OR EX-PARTNER?: NO

## 2025-05-04 SDOH — SOCIAL STABILITY: SOCIAL INSECURITY: WITHIN THE LAST YEAR, HAVE YOU BEEN AFRAID OF YOUR PARTNER OR EX-PARTNER?: NO

## 2025-05-04 SDOH — SOCIAL STABILITY: SOCIAL INSECURITY: DO YOU FEEL ANYONE HAS EXPLOITED OR TAKEN ADVANTAGE OF YOU FINANCIALLY OR OF YOUR PERSONAL PROPERTY?: NO

## 2025-05-04 SDOH — SOCIAL STABILITY: SOCIAL INSECURITY: DOES ANYONE TRY TO KEEP YOU FROM HAVING/CONTACTING OTHER FRIENDS OR DOING THINGS OUTSIDE YOUR HOME?: NO

## 2025-05-04 SDOH — SOCIAL STABILITY: SOCIAL NETWORK: HOW OFTEN DO YOU GET TOGETHER WITH FRIENDS OR RELATIVES?: TWICE A WEEK

## 2025-05-04 SDOH — HEALTH STABILITY: PHYSICAL HEALTH: ON AVERAGE, HOW MANY DAYS PER WEEK DO YOU ENGAGE IN MODERATE TO STRENUOUS EXERCISE (LIKE A BRISK WALK)?: 0 DAYS

## 2025-05-04 SDOH — SOCIAL STABILITY: SOCIAL NETWORK
DO YOU BELONG TO ANY CLUBS OR ORGANIZATIONS SUCH AS CHURCH GROUPS, UNIONS, FRATERNAL OR ATHLETIC GROUPS, OR SCHOOL GROUPS?: NO

## 2025-05-04 SDOH — HEALTH STABILITY: MENTAL HEALTH
DO YOU FEEL STRESS - TENSE, RESTLESS, NERVOUS, OR ANXIOUS, OR UNABLE TO SLEEP AT NIGHT BECAUSE YOUR MIND IS TROUBLED ALL THE TIME - THESE DAYS?: TO SOME EXTENT

## 2025-05-04 SDOH — SOCIAL STABILITY: SOCIAL INSECURITY: ARE THERE ANY APPARENT SIGNS OF INJURIES/BEHAVIORS THAT COULD BE RELATED TO ABUSE/NEGLECT?: NO

## 2025-05-04 SDOH — SOCIAL STABILITY: SOCIAL INSECURITY: HAS ANYONE EVER THREATENED TO HURT YOUR FAMILY OR YOUR PETS?: NO

## 2025-05-04 SDOH — SOCIAL STABILITY: SOCIAL INSECURITY: DO YOU FEEL UNSAFE GOING BACK TO THE PLACE WHERE YOU ARE LIVING?: NO

## 2025-05-04 SDOH — SOCIAL STABILITY: SOCIAL INSECURITY: HAVE YOU HAD THOUGHTS OF HARMING ANYONE ELSE?: NO

## 2025-05-04 SDOH — SOCIAL STABILITY: SOCIAL INSECURITY: WERE YOU ABLE TO COMPLETE ALL THE BEHAVIORAL HEALTH SCREENINGS?: YES

## 2025-05-04 SDOH — SOCIAL STABILITY: SOCIAL INSECURITY: ARE YOU OR HAVE YOU BEEN THREATENED OR ABUSED PHYSICALLY, EMOTIONALLY, OR SEXUALLY BY ANYONE?: NO

## 2025-05-04 ASSESSMENT — ACTIVITIES OF DAILY LIVING (ADL)
HEARING - LEFT EAR: FUNCTIONAL
DRESSING YOURSELF: DEPENDENT
WALKS IN HOME: DEPENDENT
PATIENT'S MEMORY ADEQUATE TO SAFELY COMPLETE DAILY ACTIVITIES?: YES
FEEDING YOURSELF: DEPENDENT
HEARING - RIGHT EAR: HEARING AID
LACK_OF_TRANSPORTATION: NO
TOILETING: DEPENDENT
JUDGMENT_ADEQUATE_SAFELY_COMPLETE_DAILY_ACTIVITIES: YES
GROOMING: NEEDS ASSISTANCE
ADEQUATE_TO_COMPLETE_ADL: YES
BATHING: DEPENDENT
LACK_OF_TRANSPORTATION: NO

## 2025-05-04 ASSESSMENT — LIFESTYLE VARIABLES
PRESCIPTION_ABUSE_PAST_12_MONTHS: NO
SKIP TO QUESTIONS 9-10: 1
AUDIT-C TOTAL SCORE: 0
AUDIT-C TOTAL SCORE: 0
SUBSTANCE_ABUSE_PAST_12_MONTHS: NO
HOW MANY STANDARD DRINKS CONTAINING ALCOHOL DO YOU HAVE ON A TYPICAL DAY: PATIENT DOES NOT DRINK
HOW OFTEN DO YOU HAVE A DRINK CONTAINING ALCOHOL: NEVER
HOW OFTEN DO YOU HAVE 6 OR MORE DRINKS ON ONE OCCASION: NEVER

## 2025-05-04 ASSESSMENT — COLUMBIA-SUICIDE SEVERITY RATING SCALE - C-SSRS
2. HAVE YOU ACTUALLY HAD ANY THOUGHTS OF KILLING YOURSELF?: NO
6. HAVE YOU EVER DONE ANYTHING, STARTED TO DO ANYTHING, OR PREPARED TO DO ANYTHING TO END YOUR LIFE?: NO
1. IN THE PAST MONTH, HAVE YOU WISHED YOU WERE DEAD OR WISHED YOU COULD GO TO SLEEP AND NOT WAKE UP?: NO

## 2025-05-04 ASSESSMENT — COGNITIVE AND FUNCTIONAL STATUS - GENERAL
DRESSING REGULAR UPPER BODY CLOTHING: A LOT
STANDING UP FROM CHAIR USING ARMS: A LITTLE
PERSONAL GROOMING: A LITTLE
CLIMB 3 TO 5 STEPS WITH RAILING: TOTAL
HELP NEEDED FOR BATHING: A LOT
TOILETING: A LOT
EATING MEALS: A LITTLE
PATIENT BASELINE BEDBOUND: NO
DAILY ACTIVITIY SCORE: 14
MOVING FROM LYING ON BACK TO SITTING ON SIDE OF FLAT BED WITH BEDRAILS: A LOT
MOBILITY SCORE: 12
MOVING TO AND FROM BED TO CHAIR: A LITTLE
TURNING FROM BACK TO SIDE WHILE IN FLAT BAD: A LOT
WALKING IN HOSPITAL ROOM: TOTAL
DRESSING REGULAR LOWER BODY CLOTHING: A LOT

## 2025-05-04 ASSESSMENT — PAIN DESCRIPTION - LOCATION: LOCATION: HIP

## 2025-05-04 ASSESSMENT — PATIENT HEALTH QUESTIONNAIRE - PHQ9
SUM OF ALL RESPONSES TO PHQ9 QUESTIONS 1 & 2: 6
1. LITTLE INTEREST OR PLEASURE IN DOING THINGS: NEARLY EVERY DAY
2. FEELING DOWN, DEPRESSED OR HOPELESS: NEARLY EVERY DAY

## 2025-05-04 ASSESSMENT — PAIN SCALES - GENERAL
PAINLEVEL_OUTOF10: 0 - NO PAIN
PAINLEVEL_OUTOF10: 7
PAINLEVEL_OUTOF10: 8
PAINLEVEL_OUTOF10: 0 - NO PAIN

## 2025-05-04 ASSESSMENT — PAIN DESCRIPTION - DESCRIPTORS
DESCRIPTORS: ACHING;THROBBING
DESCRIPTORS: ACHING;THROBBING

## 2025-05-04 ASSESSMENT — PAIN - FUNCTIONAL ASSESSMENT
PAIN_FUNCTIONAL_ASSESSMENT: 0-10

## 2025-05-04 ASSESSMENT — PAIN DESCRIPTION - ORIENTATION: ORIENTATION: RIGHT

## 2025-05-04 NOTE — DISCHARGE SUMMARY
Discharge Diagnosis  Syncope and collapse           Issues Requiring Follow-Up  Transitioned to swing unit.     Discharge Meds     Medication List      PAUSE taking these medications     furosemide 20 mg tablet; Wait to take this until your doctor or other   care provider tells you to start again.; Commonly known as: Lasix; Take 1   tablet by mouth once daily   lisinopril 20 mg tablet; Wait to take this until your doctor or other   care provider tells you to start again.; Take 1 tablet by mouth once daily     START taking these medications     amoxicillin-clavulanate 875-125 mg tablet; Commonly known as: Augmentin;   Take 1 tablet by mouth 2 times a day for 5 doses.   magnesium hydroxide 2,400 mg/10 mL suspension suspension; Commonly known   as: Milk of Magnesia; Take 10 mL by mouth once daily as needed for   constipation.   melatonin 3 mg tablet; Take 1 tablet (3 mg) by mouth once daily at   bedtime.   ondansetron ODT 4 mg disintegrating tablet; Commonly known as:   Zofran-ODT; Dissolve 1 tablet (4 mg) in the mouth every 8 hours if needed   for nausea.   * oxyCODONE 5 mg immediate release tablet; Commonly known as:   Roxicodone; Take 1 tablet (5 mg) by mouth every 6 hours if needed for   moderate pain (4 - 6).   * oxyCODONE 10 mg immediate release tablet; Commonly known as:   Roxicodone; Take 1 tablet (10 mg) by mouth every 6 hours if needed for   severe pain (7 - 10).   sennosides 8.6 mg tablet; Commonly known as: Senokot; Take 2 tablets   (17.2 mg) by mouth once daily.; Start taking on: May 5, 2025  * This list has 2 medication(s) that are the same as other medications   prescribed for you. Read the directions carefully, and ask your doctor or   other care provider to review them with you.     CONTINUE taking these medications     aspirin 81 mg chewable tablet   atorvastatin 20 mg tablet; Commonly known as: Lipitor; Take 1 tablet by   mouth once daily   buPROPion  mg 24 hr tablet; Commonly known as:  Wellbutrin XL; Take   1 tablet by mouth once daily   escitalopram 20 mg tablet; Commonly known as: Lexapro; Take 1 tablet by   mouth once daily   LORazepam 1 mg tablet; Commonly known as: Ativan; TAKE 1 TABLET BY MOUTH   AT NIGHT AS NEEDED   meloxicam 15 mg tablet; Commonly known as: Mobic; Take 1 tablet by mouth   once daily   propranolol  mg 24 hr capsule; Commonly known as: Inderal LA; Take   1 capsule (120 mg) by mouth once daily. Do not crush, chew, or split.     STOP taking these medications     clotrimazole-betamethasone cream; Commonly known as: Lotrisone   Metamucil Sugar-Free (aspart) 3.4 gram/5.8 gram powder; Generic drug:   psyllium husk   ondansetron 4 mg tablet; Commonly known as: Zofran   tobramycin 0.3 % ophthalmic solution; Commonly known as: Tobrex       Test Results Pending At Discharge  Pending Labs       No current pending labs.            Hospital Course   Karina Lee is a 84 y.o. female presenting with a complaint of a fall. EMS were called to her house due to a fall. The patient was ambulating to the bathroom when she passed out. She fell and hit her right hip on the ground.   CT right hip Nondisplaced fracture of the greater trochanter of the proximal right femur. She is non-surgical and expressed that she would not want surgical intervention. Patient is pain controlled and transitioned to the swing unit for physical therapy.     Pertinent Physical Exam At Time of Discharge  Vitals reviewed.   Constitutional:       Appearance: Normal appearance. She is obese.   HENT:      Head: Normocephalic and atraumatic.      Right Ear: External ear normal.      Left Ear: External ear normal.      Nose: clear     Mouth/Throat: clear     Mouth: Mucous membranes are moist.      Pharynx: Oropharynx is clear.   Eyes:      Conjunctiva/sclera: Conjunctivae normal.      Pupils: Pupils are equal, round, and reactive to light.   Cardiovascular:      Rate and Rhythm: Normal rate and regular rhythm.       Pulses: Normal pulses.      Heart sounds: Normal heart sounds.   Pulmonary:      Effort: Pulmonary effort is normal.      Breath sounds: Normal breath sounds.   Abdominal:      General: Bowel sounds are normal.      Palpations: Abdomen is soft.   Musculoskeletal:         General: Swelling and tenderness present. Right hip.     Cervical back: Normal range of motion and neck supple.      Comments: Limited ROM to right leg   Skin:     General: Skin is warm and dry.      Findings: Bruising present.   Neurological:      General: No focal deficit present.      Mental Status: She is alert and oriented to person, place, and time.   Psychiatric:         Mood and Affect: Mood normal.         Behavior: Behavior normal.     Outpatient Follow-Up  Future Appointments   Date Time Provider Department Center   6/17/2025  5:45 PM Madison Craft DO [id number]         WILLA Agudelo-CNP

## 2025-05-04 NOTE — NURSING NOTE
Assumed care of patient, new to swing unit from med/surge. Pt assisted into recliner chair from w/c with 2 assist.

## 2025-05-04 NOTE — CARE PLAN
The patient's goals for the shift include      The clinical goals for the shift include patient will tolerate being up in chair for meals through 1900  Patient with uneventful morning. Denies any pain. Resting in bed. Given MOM due to no BM since admission. Abd soft non tender hypo sounds. Donovan patent and draining. Patient discharging and going to [facility] swing bed. Nurse to nurse given to [place].

## 2025-05-04 NOTE — CARE PLAN
The patient's goals for the shift include      The clinical goals for the shift include Pt will tolerate increased activity this shift    Over the shift, the patient had an uneventful shift  VSS  Turning well in bed with minimal assist  No c/o pain while in bed  Good urine output  Call light in reach  Safety measures maintained

## 2025-05-04 NOTE — H&P
History of Present Illness  Karina Lee is a 84 y.o. female presenting with a complaint of a fall. EMS were called to her house due to a fall. The patient was ambulating to the bathroom when she passed out. She fell and hit her right hip on the ground.   CT right hip Nondisplaced fracture of the greater trochanter of the proximal right femur. She is non-surgical and expressed that she would not want surgical intervention. Patient is pain controlled and transitioned to the swing unit for physical therapy.         Past Medical History  Medical History[1]    Surgical History  Surgical History[2]     Social History  She reports that she has never smoked. She has never been exposed to tobacco smoke. She has never used smokeless tobacco. She reports that she does not use drugs. No history on file for alcohol use.    Allergies  Anesthetics - sonja type- parabens, Cephalexin, Nitrofurantoin macrocrystal, Tetanus vaccines and toxoid, Tramadol, Oxycodone-acetaminophen, and Sulfa (sulfonamide antibiotics)     Vitals reviewed.   Constitutional:       Appearance: Normal appearance. She is obese.   HENT:      Head: Normocephalic and atraumatic.      Right Ear: External ear normal.      Left Ear: External ear normal.      Nose: clear     Mouth/Throat: clear     Mouth: Mucous membranes are moist.      Pharynx: Oropharynx is clear.   Eyes:      Conjunctiva/sclera: Conjunctivae normal.      Pupils: Pupils are equal, round, and reactive to light.   Cardiovascular:      Rate and Rhythm: Normal rate and regular rhythm.      Pulses: Normal pulses.      Heart sounds: Normal heart sounds.   Pulmonary:      Effort: Pulmonary effort is normal.      Breath sounds: Normal breath sounds.   Abdominal:      General: Bowel sounds are normal.      Palpations: Abdomen is soft.   Musculoskeletal:         General: Swelling and tenderness present. Right hip.     Cervical back: Normal range of motion and neck supple.      Comments: Limited ROM to right  "leg   Skin:     General: Skin is warm and dry.      Findings: Bruising present.   Neurological:      General: No focal deficit present.      Mental Status: She is alert and oriented to person, place, and time.   Psychiatric:         Mood and Affect: Mood normal.         Behavior: Behavior normal.      Last Recorded Vitals  Blood pressure 162/80, pulse 59, temperature 36.3 °C (97.4 °F), temperature source Temporal, resp. rate 18, height 1.676 m (5' 5.98\"), weight 89.4 kg (197 lb), SpO2 98%.    Relevant Results  Scheduled medications  Scheduled Medications[3]  Continuous medications  Continuous Medications[4]  PRN medications  PRN Medications[5]     Results for orders placed or performed during the hospital encounter of 04/29/25 (from the past 24 hours)   Basic Metabolic Panel   Result Value Ref Range    Glucose 117 (H) 74 - 99 mg/dL    Sodium 135 (L) 136 - 145 mmol/L    Potassium 5.1 3.5 - 5.3 mmol/L    Chloride 104 98 - 107 mmol/L    Bicarbonate 29 21 - 32 mmol/L    Anion Gap 7 (L) 10 - 20 mmol/L    Urea Nitrogen 19 6 - 23 mg/dL    Creatinine 1.03 0.50 - 1.05 mg/dL    eGFR 54 (L) >60 mL/min/1.73m*2    Calcium 9.1 8.6 - 10.3 mg/dL   CBC   Result Value Ref Range    WBC 4.0 (L) 4.4 - 11.3 x10*3/uL    nRBC 0.0 0.0 - 0.0 /100 WBCs    RBC 3.47 (L) 4.00 - 5.20 x10*6/uL    Hemoglobin 11.3 (L) 12.0 - 16.0 g/dL    Hematocrit 35.0 (L) 36.0 - 46.0 %     (H) 80 - 100 fL    MCH 32.6 26.0 - 34.0 pg    MCHC 32.3 32.0 - 36.0 g/dL    RDW 13.2 11.5 - 14.5 %    Platelets 130 (L) 150 - 450 x10*3/uL   B-type natriuretic peptide   Result Value Ref Range     (H) 0 - 99 pg/mL        Imaging  Imaging  No results found.    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)     Right Greater trochanter of femur fracture  Fall at home  - PT/OT evaluation  - WBAT  - Follow up with " orthopedic outpatient, non surgical  - started acetaminophen 975 mg daily  - stopped toradol  - oxycodone 5 - 10 mg q6h, prn     Acute paranasal sinusitis~resolved  - started augmentin 625 mg BID  - started flonase 2 spray daily     Syncope and collapse  ASHD  Dyslipidemia  Essential HTN  - troponin 12 > 11  - continue furosemide 20 mg daily, aspirin 81 mg daily, atorvastatin 20 mg daily  - hold lisinopril 20 mg daily  - refusing furosemide and flonase~discontinued  - monitor BP     Acute on Chronic kidney failure, Stage 3a-resolved  Urine retention  - baseline creatine 1.18  - creatine on admission 1.18; creatine today 1.08  - monitor renal function  - renally dose medication  - started 0.9% NS at 75 mL/hr x 1 day  - bladder scanned for > 400 mL              ~likely 2/2 trauma of right hip fracture  - inserted angel catheter  - KUB constipation     Hyponatremia~resolved  - Na 132 > 128>130>136  - add sodium bicarb if continues with hyponatremia     Thrombocytopenia  Normocytic anemia  -  > 126 > 104  - Hb 11.6;   - monitor CBC     Anxiety and depression  - continue bupropion 150 mg daily, escitalopram 20 mg daily, propranolol 120 mg daily     DVT ppx  - discontinued enoxaparin 30 mg daily  - discontinued fondaparinux 2.5 mg daily     Code status: Full     Disposition: Patient requires more than 2 inpatient days.              ~awaiting placement.    Disposition: Admitted to [facility] unit for aggressive rehab s/p fracture RLE.    I spent 35 minutes in the professional and overall care of this patient.      MINISTERIO Agudelo  Internal Medicine  Attending Attestation:    Patient was seen and examined face to face, history and physical was taken personally at bedside the APRN-CNP, was present for the whole duration of the exam who participated in the documentation of this note. I performed the medical decision-making components (assessment and plan of care). I have reviewed the documentation  and verified the findings in the note as written with additions or exceptions as stated in the body of this note.  Status post fall and right greater trochanteric fracture not a surgical candidate, was admitted here in acute care hospital, had pain medication and physical and Occupational Therapy, pain is much better controlled, weightbearing as tolerated patient is now in swing bed for more rehab.  She is feeling well she denies chest pain, shortness of breath, pain is well-controlled, on exam heart regular, lungs clear to auscultation, abdomen soft bowel sounds are present, still some bruises over the right side of the thigh and back, pedal pulses are present.  We will admit patient in rehab, physical Occupational Therapy consult, continue with pain medication mainly Tylenol and Oxy as needed, DVT prophylaxis, protein calorie diet, weekly lab.    Dr. Elias Patino MD  Internal Medicine        [1]   Past Medical History:  Diagnosis Date    Acute upper respiratory infection, unspecified 01/07/2015    Acute URI    Age-related osteoporosis without current pathological fracture     Osteoporosis    Anxiety     Anxiety disorder, unspecified     Anxiety    Arthritis     Cerebral infarction, unspecified 01/25/2018    CVA (cerebral vascular accident)    Dermatitis due to ingested food 03/03/2015    Allergic dermatitis due ingested food    Dizziness and giddiness 01/25/2018    Dizziness, nonspecific    Fatty (change of) liver, not elsewhere classified     Nonalcoholic fatty liver disease    GERD (gastroesophageal reflux disease)     Hypertension     Nutritional intake less than body requirements 01/03/2024    Other conditions influencing health status 01/25/2018    Asymmetrical hearing loss of both ears    Pain 01/03/2024    Pain in unspecified hip 01/25/2018    Hip pain    Personal history of other diseases of the circulatory system 01/19/2015    History of hypertension    Personal history of other diseases of the  digestive system 01/25/2018    History of gastroesophageal reflux (GERD)    Personal history of other diseases of the musculoskeletal system and connective tissue     Personal history of arthritis    Personal history of other diseases of the nervous system and sense organs 02/04/2015    History of acute otitis externa    Personal history of other diseases of urinary system 03/05/2014    History of renal insufficiency syndrome    Personal history of other endocrine, nutritional and metabolic disease 01/25/2018    History of hyperlipidemia    Personal history of other specified conditions 01/19/2015    History of headache    Personal history of other specified conditions 06/12/2019    History of diarrhea    Personal history of transient ischemic attack (TIA), and cerebral infarction without residual deficits 10/23/2017    History of cerebrovascular accident    Stroke (Multi)     Unspecified hearing loss, unspecified ear 01/25/2018    Hearing difficulty   [2]   Past Surgical History:  Procedure Laterality Date    ADENOIDECTOMY  01/19/2015    Adenoidectomy    BACK SURGERY  11/26/2013    Back Surgery    CHOLECYSTECTOMY  11/26/2013    Cholecystectomy    CT ANGIO NECK  6/3/2017    CT NECK ANGIO W AND WO IV CONTRAST 6/3/2017 [facility]    CT HEAD ANGIO W AND WO IV CONTRAST  6/3/2017    CT HEAD ANGIO W AND WO IV CONTRAST 6/3/2017 [facility] CLINICAL [facility]GAC[facility]    KNEE SURGERY  11/26/2013    Knee Surgery    TONSILLECTOMY  01/16/2014    Tonsillectomy    TOTAL HIP ARTHROPLASTY  01/04/2017    Hip Replacement   [3] amoxicillin-clavulanate, 1 tablet, oral, BID  aspirin, 81 mg, oral, Daily  atorvastatin, 20 mg, oral, Daily  buPROPion XL, 150 mg, oral, Daily  enoxaparin, 40 mg, subcutaneous, Daily  escitalopram, 20 mg, oral, Daily  furosemide, 20 mg, oral, Daily  lisinopril, 20 mg, oral, Daily  melatonin, 3 mg, oral, Nightly  [START ON 5/5/2025] meloxicam, 15 mg, oral, Daily  propranolol LA, 120 mg, oral, Daily  [START ON 5/5/2025]  sennosides, 2 tablet, oral, Daily  [4]    [5] PRN medications: magnesium hydroxide, ondansetron ODT, oxyCODONE, oxyCODONE

## 2025-05-05 PROCEDURE — 2500000001 HC RX 250 WO HCPCS SELF ADMINISTERED DRUGS (ALT 637 FOR MEDICARE OP): Mod: IPSPLIT | Performed by: NURSE PRACTITIONER

## 2025-05-05 PROCEDURE — 1900000001 HC SKILLED SWING ROOM

## 2025-05-05 PROCEDURE — 97530 THERAPEUTIC ACTIVITIES: CPT | Mod: GP,CQ

## 2025-05-05 PROCEDURE — 97161 PT EVAL LOW COMPLEX 20 MIN: CPT | Mod: GP

## 2025-05-05 PROCEDURE — 2500000005 HC RX 250 GENERAL PHARMACY W/O HCPCS: Performed by: NURSE PRACTITIONER

## 2025-05-05 PROCEDURE — 2500000002 HC RX 250 W HCPCS SELF ADMINISTERED DRUGS (ALT 637 FOR MEDICARE OP, ALT 636 FOR OP/ED): Mod: IPSPLIT | Performed by: NURSE PRACTITIONER

## 2025-05-05 PROCEDURE — 97165 OT EVAL LOW COMPLEX 30 MIN: CPT | Mod: GO

## 2025-05-05 PROCEDURE — 94760 N-INVAS EAR/PLS OXIMETRY 1: CPT

## 2025-05-05 PROCEDURE — 97116 GAIT TRAINING THERAPY: CPT | Mod: GP,CQ

## 2025-05-05 PROCEDURE — 2500000004 HC RX 250 GENERAL PHARMACY W/ HCPCS (ALT 636 FOR OP/ED): Mod: JZ,IPSPLIT | Performed by: NURSE PRACTITIONER

## 2025-05-05 RX ADMIN — MAGNESIUM HYDROXIDE 10 ML: 2400 SUSPENSION ORAL at 08:46

## 2025-05-05 RX ADMIN — FUROSEMIDE 20 MG: 20 TABLET ORAL at 08:46

## 2025-05-05 RX ADMIN — MELATONIN TAB 3 MG 3 MG: 3 TAB at 20:13

## 2025-05-05 RX ADMIN — SENNOSIDES 17.2 MG: 8.6 TABLET, FILM COATED ORAL at 08:46

## 2025-05-05 RX ADMIN — LISINOPRIL 20 MG: 20 TABLET ORAL at 08:46

## 2025-05-05 RX ADMIN — ENOXAPARIN SODIUM 40 MG: 40 INJECTION SUBCUTANEOUS at 08:46

## 2025-05-05 RX ADMIN — MELOXICAM 15 MG: 7.5 TABLET ORAL at 08:46

## 2025-05-05 RX ADMIN — BENZOCAINE AND MENTHOL 1 LOZENGE: 15; 3.6 LOZENGE ORAL at 20:13

## 2025-05-05 RX ADMIN — BUPROPION HYDROCHLORIDE 150 MG: 150 TABLET, EXTENDED RELEASE ORAL at 20:13

## 2025-05-05 RX ADMIN — ESCITALOPRAM OXALATE 20 MG: 10 TABLET, FILM COATED ORAL at 20:13

## 2025-05-05 RX ADMIN — AMOXICILLIN AND CLAVULANATE POTASSIUM 1 TABLET: 875; 125 TABLET, FILM COATED ORAL at 20:13

## 2025-05-05 RX ADMIN — ATORVASTATIN CALCIUM 20 MG: 10 TABLET, FILM COATED ORAL at 20:13

## 2025-05-05 RX ADMIN — PROPRANOLOL HYDROCHLORIDE 120 MG: 60 CAPSULE, EXTENDED RELEASE ORAL at 08:46

## 2025-05-05 RX ADMIN — ASPIRIN 81 MG: 81 TABLET, CHEWABLE ORAL at 20:13

## 2025-05-05 RX ADMIN — BENZOCAINE AND MENTHOL 1 LOZENGE: 15; 3.6 LOZENGE ORAL at 12:28

## 2025-05-05 RX ADMIN — AMOXICILLIN AND CLAVULANATE POTASSIUM 1 TABLET: 875; 125 TABLET, FILM COATED ORAL at 08:46

## 2025-05-05 ASSESSMENT — PAIN DESCRIPTION - DESCRIPTORS
DESCRIPTORS: ACHING;THROBBING
DESCRIPTORS: ACHING;THROBBING

## 2025-05-05 ASSESSMENT — COGNITIVE AND FUNCTIONAL STATUS - GENERAL
MOVING FROM LYING ON BACK TO SITTING ON SIDE OF FLAT BED WITH BEDRAILS: A LOT
MOBILITY SCORE: 12
CLIMB 3 TO 5 STEPS WITH RAILING: TOTAL
DRESSING REGULAR UPPER BODY CLOTHING: A LITTLE
TOILETING: A LOT
STANDING UP FROM CHAIR USING ARMS: A LITTLE
CLIMB 3 TO 5 STEPS WITH RAILING: TOTAL
HELP NEEDED FOR BATHING: A LOT
DRESSING REGULAR LOWER BODY CLOTHING: A LOT
DAILY ACTIVITIY SCORE: 16
WALKING IN HOSPITAL ROOM: A LOT
TURNING FROM BACK TO SIDE WHILE IN FLAT BAD: A LOT
MOVING FROM LYING ON BACK TO SITTING ON SIDE OF FLAT BED WITH BEDRAILS: A LOT
MOBILITY SCORE: 11
PERSONAL GROOMING: A LITTLE
STANDING UP FROM CHAIR USING ARMS: A LOT
WALKING IN HOSPITAL ROOM: A LOT
TURNING FROM BACK TO SIDE WHILE IN FLAT BAD: A LOT
MOVING TO AND FROM BED TO CHAIR: A LOT
MOVING TO AND FROM BED TO CHAIR: A LOT

## 2025-05-05 ASSESSMENT — PAIN SCALES - GENERAL
PAINLEVEL_OUTOF10: 0 - NO PAIN
PAINLEVEL_OUTOF10: 6
PAINLEVEL_OUTOF10: 0 - NO PAIN
PAINLEVEL_OUTOF10: 4
PAINLEVEL_OUTOF10: 6

## 2025-05-05 ASSESSMENT — PAIN - FUNCTIONAL ASSESSMENT
PAIN_FUNCTIONAL_ASSESSMENT: 0-10

## 2025-05-05 ASSESSMENT — ACTIVITIES OF DAILY LIVING (ADL)
ADL_ASSISTANCE: INDEPENDENT
BATHING_ASSISTANCE: MODERATE

## 2025-05-05 NOTE — CARE PLAN
Occupational Therapy    Swing Bed Program  Patient Profile & Activities Assessment        Precautions:  Fall precautions    Cognition:  Within Functional Limits    Preferred Learning Style:  kinesthetic, visual    Explained: Discussed with patient availability of books, books on tape, games, movies, videos, social visits, pet therapy visits. Coloring, crossword puzzle/word search/sodoku books also available. Can request walk or stroll with staff.    Activity Plan:  Patient will be invited to participate in all appropriate activities.    Goal: Patient will participate in activities daily for socialization, intellectual stimulation, and spiritual needs according to patient's preferences.    Mary Shannon, OT

## 2025-05-05 NOTE — PROGRESS NOTES
Physical Therapy    Physical Therapy Evaluation    Patient Name: Karina Lee  MRN: [id number]  Department: [facility]  Room: [address] [place], [place] [address]  Today's Date: 5/5/2025   Time Calculation  Start Time: 0901  Stop Time: 0925  Time Calculation (min): 24 min    Assessment/Plan   PT Assessment  PT Assessment Results: Decreased strength, Decreased endurance, Decreased range of motion, Impaired balance, Decreased mobility, Pain  Rehab Prognosis: Fair  Barriers to Discharge Home: Physical needs, Caregiver assistance  Caregiver Assistance: Patient lives alone and/or does not have reliable caregiver assistance  Physical Needs: Ambulating household distances limited by function/safety, Intermittent mobility assistance needed, High falls risk due to function or environment  Evaluation/Treatment Tolerance: Patient limited by pain  Medical Staff Made Aware: Yes  Strengths: Attitude of self  Barriers to Participation: Comorbidities  End of Session Communication: Bedside nurse, PCT/NA/CTA  Assessment Comment: Pt is a 83 y/o F seen for PT evaluation following admission for a fall and subsequent R hip fx. Pt is  demonstrating deficits in strength, ROM, endurance, balance, and general mobility. Pt requires skilled PT intervention to address deficits and prevent decline while hospitalized.  End of Session Patient Position: Up in chair, Alarm on  IP OR SWING BED PT PLAN  Inpatient or Swing Bed: Swing Bed  PT Plan  Treatment/Interventions: Bed mobility, Transfer training, Gait training, Stair training, Balance training, Neuromuscular re-education, Therapeutic exercise, Therapeutic activity, Strengthening, Endurance training, Home exercise program  PT Plan: Ongoing PT  PT Frequency: BID  PT Discharge Recommendations: Low intensity level of continued care  Equipment Recommended upon Discharge: Wheeled walker  PT Recommended Transfer Status: Assist x2  PT - OK to Discharge: Yes Based on completed evaluation and care plan recommendations, no barriers to  discharge to next site of care.     Subjective   General Visit Information:  General  Reason for Referral: impaired mobility and gait difficulty  Referred By: WILLA Fitzpatrick-CNP  Past Medical History Relevant to Rehab: Acute URI, osteoporosis, anxiety, OA, CVA, GERD, HTN  Co-Treatment: OT  Co-Treatment Reason: d/t increased assist levels needed and to maximize pt participation  Prior to Session Communication: Bedside nurse, PCT/NA/CTA  Patient Position Received: Bed, 2 rail up, Alarm off, not on at start of session  Preferred Learning Style: kinesthetic, verbal  General Comment: Pt pleasant and agreeable to participate, cleared by nursing, left w call bell in reach.  Home Living:  Home Living  Type of Home: House  Lives With: Alone  Home Adaptive Equipment: Walker rolling or standard, Wheelchair-manual (rollator)  Home Layout: One level  Home Access: Stairs to enter without rails  Entrance Stairs-Rails: None  Entrance Stairs-Number of Steps: 4 inch threshold from garage to kitchen  Bathroom Shower/Tub: Walk-in shower (reports small threshold to enter)  Bathroom Toilet: Handicapped height  Bathroom Equipment: Grab bars in shower  Home Living Comments: sleeps in recliner/lift chair  Prior Level of Function:  Prior Function Per Pt/Caregiver Report  Level of [place]: Independent with ADLs and functional transfers, Independent with homemaking with wheelchair  Receives Help From: Family  Prior Function Comments: Pt reporting independence for PLOF. Does report she was in WC  primarily but would use FWW to enter bed/bathroom d/t narrow spaces. Does report FWW does not fit in bathroom well and would furniture amb. Reports 2 falls in last year, each stemming from using the bathroom.  Precautions:  Precautions  Hearing/Visual Limitations: R hearing aid  LE Weight Bearing Status: Weight Bearing as Tolerated  Medical Precautions: Fall precautions  Precautions Comment: R hip fx             Objective   Pain:  Pain  Assessment  Pain Assessment: 0-10  0-10 (Numeric) Pain Score: 6  Pain Type: Acute pain  Pain Location: Hip  Pain Orientation: Right  Patient's Stated Pain Goal: No pain  Pain Interventions: Distraction  Cognition:  Cognition  Overall Cognitive Status: Within Functional Limits  Orientation Level: Oriented X4    General Assessments:  Activity Tolerance  Endurance: Tolerates 10 - 20 min exercise with multiple rests    Sensation  Sensation Comment: no apparant deficits    Postural Control  Postural Control: Within Functional Limits    Static Sitting Balance  Static Sitting-Balance Support: Feet supported, Bilateral upper extremity supported  Static Sitting-Level of Assistance: Close supervision  Static Sitting-Comment/Number of Minutes: seated EOB mult min       Functional Assessments:  Bed Mobility 1  Bed Mobility 1: Supine to sitting  Level of Assistance 1: Minimum assistance  Bed Mobility Comments 1: 1 trial, HOB raised moderately, some use of bed rails noted. min A to assist intermittently in bringing RLE off EOB.  Bed Mobility 2  Bed Mobility  2: Scooting  Level of Assistance 2: Close supervision  Bed Mobility Comments 2: 1 trial, towards EOB, slow to perform    Transfer 1  Transfer From 1: Bed to  Transfer to 1: Sit, Stand  Technique 1: Sit to stand, Stand to sit  Transfer Device 1: Gait belt, Walker  Transfer Level of Assistance 1: Moderate assistance, Minimum assistance, +2  Trials/Comments 1: 2 trials, mod x2 for 1st trial, min x2 for 2nd trial after vcs for technique and positioning.  Transfers 2  Transfer From 2: Bed to  Transfer to 2: Chair with arms  Technique 2: Stand pivot  Transfer Device 2: Walker, Gait belt  Transfer Level of Assistance 2: Minimum assistance, Moderate verbal cues  Trials/Comments 2: 1 trial, towards L, min x2 for pt safety and to guid pt to chair. vcs for hand placement and controlled descent, as well as positioning prior to sitting  Transfers 3  Transfer From 3: Chair with arms  to  Transfer to 3: Stand, Sit  Technique 3: Sit to stand, Stand to sit  Transfer Device 3: Walker, Gait belt  Transfer Level of Assistance 3: Minimum assistance  Trials/Comments 3: 1 trial, min A x2, cont vcs as stated above    Ambulation/Gait Training  Ambulation/Gait Training Performed: Yes  Ambulation/Gait Training 1  Surface 1: Level tile  Device 1: Rolling walker  Gait Support Devices: Gait belt  Assistance 1: Minimum assistance  Quality of Gait 1: Diminished heel strike, Inconsistent stride length, Decreased step length, Forward flexed posture, Antalgic  Comments/Distance (ft) 1: 1x2 ft, vcs for sequencing, FWW placement. Pt w difficulty advacning LLE d/t difficulty w WS onto RLE.  Extremity/Trunk Assessments:  RLE   RLE :  (deferred d/t pain)  LLE   LLE :  (grossly 4/5)  Outcome Measures:  [facility] Basic Mobility  Turning from your back to your side while in a flat bed without using bedrails: A lot  Moving from lying on your back to sitting on the side of a flat bed without using bedrails: A lot  Moving to and from bed to chair (including a wheelchair): A lot  Standing up from a chair using your arms (e.g. wheelchair or bedside chair): A lot  To walk in hospital room: A lot  Climbing 3-5 steps with railing: Total  Basic Mobility - Total Score: 11    Encounter Problems       Encounter Problems (Active)       PT Problem       pt will be able to ambulate 20 ft with FWW and modified independent        Start:  05/05/25    Expected End:  05/26/25            Pt will be able to navigate 1 4 inch threshold with no hand rail and modified independent to allow for safe DC.         Start:  05/05/25    Expected End:  05/26/25            Pt will be able to perform STS transfer with modified independent        Start:  05/05/25    Expected End:  05/26/25            pt will be able to propel self home distances with manual WC and modified independent        Start:  05/05/25    Expected End:  05/26/25            Pt will  demonstrate ability to  object from ground from sitting position, without a device and mod I       Start:  05/05/25    Expected End:  05/26/25            Pt will be able to navigate in narrow as needed w FWW to allow for entrance into home bathroom.       Start:  05/05/25    Expected End:  05/26/25               Pain - Adult              Education Documentation  Mobility Training, taught by Tali Youngblood, PT at 5/5/2025 12:13 PM.  Learner: Patient  Readiness: Acceptance  Method: Explanation  Response: Demonstrated Understanding, Verbalizes Understanding  Comment: Pt edu on role of PT and POC, as well as importance of mobility w proper AD for maximum safety    Education Comments  No comments found.

## 2025-05-05 NOTE — CARE PLAN
The patient's goals for the shift include      The clinical goals for the shift include Patient will use call bell for assistance    Patient in bed. Call bell in reach. Tolerating medications. Donovan intact and draining. Patient unable to stand to get weight this morning.

## 2025-05-05 NOTE — CARE PLAN
The clinical goals for the shift include Pt to participate in therapy, use acall schwartz for assistance    Pt participated in therapy for a short time. Pt had difficulty getting out of bed but did better once bed completely flat. Pt was on and off of BSC today and had BM. Pt is pleasant and cooperative. Pt slow at times with answering questions or with cognitive perceptions. Pt medication compliant, whole in pudding (if small enough). Pt c/o sore throat, new order for cepacol with good effect. Donovan remains in place, no leakage, no c/o pain or discomfort. Call bell within reach. Family at bedside visiting currently.

## 2025-05-05 NOTE — PROGRESS NOTES
Occupational Therapy    Evaluation    Patient Name: Karina Lee  MRN: [id number]  Department: [facility]  Room: [address] [place] [address]  Today's Date: 5/5/2025  Time Calculation  Start Time: 0902  Stop Time: 0925  Time Calculation (min): 23 min    Assessment  IP OT Assessment  OT Assessment: RN cleared pt. OT/PT co-eval to maximize pt's participation and safety with mobility. OT orders received and occupational profile established via interview method, data gathering, and review of medical records to determine low complexity and establish OT POC. At this time, pt displays a decline from PLOF including diminished- ADL/IADL independence, functional mobility, balance, task tolerance, and increased pain impacting ability to complete daily routine. Pt would benefit from OT services to address deficit areas to restore QOL and increase safety prior to d/c home. At end, pt positioned into chair with alarm on. Call light in reach, needs met. RN notified.  Prognosis: Good  Barriers to Discharge Home: Caregiver assistance, Physical needs  Caregiver Assistance: Patient lives alone and/or does not have reliable caregiver assistance  Physical Needs: Ambulating household distances limited by function/safety, 24hr mobility assistance needed, Intermittent ADL assistance needed, High falls risk due to function or environment  Evaluation/Treatment Tolerance: Patient limited by pain  Medical Staff Made Aware: Yes  End of Session Communication: Bedside nurse, PCT/NA/CTA  End of Session Patient Position: Up in chair, Alarm on    Plan:  OT - OK to Discharge: Yes    Subjective   Current Problem:  No diagnosis found.    General:  General  Reason for Referral: Assess ADLs  Referred By: Lee Ann Garnett, APRN-CNP  Past Medical History Relevant to Rehab: Acute URI, osteoporosis, anxiety, OA, CVA, GERD, HTN  Family/Caregiver Present: No  Co-Treatment: PT  Co-Treatment Reason: Maximize pt safety with transfers and increase participation  Prior to Session  Communication: Bedside nurse, PCT/NA/CTA  Patient Position Received: Bed, 2 rail up, Alarm off, not on at start of session  Preferred Learning Style: kinesthetic, visual  General Comment: Pt supine in bed, previous attempt to evaluate pt declined as she was eating. At this time, pt is finished with eating and agreeable to work with therapy.    Precautions:  Hearing/Visual Limitations: Wear hearing aid/glasses  LE Weight Bearing Status: Weight Bearing as Tolerated  Medical Precautions: Fall precautions  Precautions Comment: R hip fx non operable    Pain:  Pain Assessment  Pain Assessment: 0-10  0-10 (Numeric) Pain Score: 6  Pain Type: Acute pain  Pain Location: Hip  Pain Orientation: Right  Pain Descriptors: Aching, Throbbing  Pain Frequency: Intermittent  Pain Onset: Progressive  Clinical Progression: Gradually worsening  Pain Interventions: Repositioned (declined cold/hot pack)  Response to Interventions: Content/relaxed    Objective   Cognition:  Overall Cognitive Status: Within Functional Limits  Orientation Level: Oriented X4    Home Living:  Type of Home: House  Lives With: Alone  Home Adaptive Equipment: Walker rolling or standard  Home Layout: One level, Laundry main level  Home Access: Stairs to enter without rails  Entrance Stairs-Rails: None  Entrance Stairs-Number of Steps: 1  Bathroom Shower/Tub: Walk-in shower  Bathroom Toilet: Adaptive toilet seating  Bathroom Equipment: Grab bars in shower  Home Living Comments: sleeps in recliner/lift chair more often than a bed     Prior Function:  Level of [place]: Independent with ADLs and functional transfers, Independent with homemaking with wheelchair  Receives Help From: Family  ADL Assistance: Independent  Homemaking Assistance: Independent (Family helps prn as needed.)  Driving/Transportation: Family/Friend  Ambulatory Assistance: Independent  Hand Dominance: Right  Prior Function Comments: per pt report, prior to hospital- independent with ADLs/IADLs  with family available to help as needed. Independent with ambulation via AD (walker in/out doorways, w/c throughout house to get room to room). Recent increase in falls. 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    IADL History:  Homemaking Responsibilities: Yes  Current License: No  Mode of Transportation: Family, Friends    ADL:  Eating Assistance: Independent  Grooming Assistance: Stand by  Grooming Deficit: Setup, Supervision/safety  Bathing Assistance: Moderate (anticipated)  Bathing Deficit: Right lower leg including foot, Left lower leg including foot, Use of adaptive equipment, Perineal area  UE Dressing Assistance: Minimal  UE Dressing Deficit: Fasteners  LE Dressing Assistance: Moderate  LE Dressing Deficit: Setup, Steadying, Verbal cueing, Supervision/safety, Increased time to complete, Use of adaptive equipment, Thread RLE into pants, Thread LLE into pants, Thread RLE into underwear, Thread LLE into underwear, Don/doff R sock, Don/doff L sock, Pull up over hips  Toileting Assistance with Device: Maximal (anticipated)  Toileting Deficit: Setup, Steadying, Supervison/safety, Increased time to complete, Clothing management up, Clothing management down, Perineal hygiene    Activity Tolerance:  Endurance: Tolerates 10 - 20 min exercise with multiple rests    Bed Mobility/Transfers:   Bed Mobility  Bed Mobility: Yes  Bed Mobility 1  Bed Mobility 1: Supine to sitting  Level of Assistance 1: Minimum assistance  Bed Mobility Comments 1: 1 trial, HOB raised. Assist to progress RLE off bed, cues to incorporate BUE with bedrail use to increase ease.  Bed Mobility 2  Bed Mobility  2: Scooting  Level of Assistance 2: Close supervision    Transfers  Transfer: Yes  Transfer 1  Transfer From 1: Sit to  Transfer to 1: Stand  Technique 1: Sit to stand, Stand to sit  Transfer Device 1: Walker, Gait belt  Transfer Level of Assistance 1: Minimum assistance, Moderate assistance, +2  Transfers 2  Transfer  From 2: Bed to  Transfer to 2: Chair with arms  Technique 2: Stand pivot  Transfer Device 2: Walker, Gait belt  Transfer Level of Assistance 2: Minimum assistance, Moderate verbal cues  Trials/Comments 2: cueing for AD use and safety. Increased time.  Transfers 3  Transfer From 3: Chair with arms to  Transfer to 3: Stand  Technique 3: Sit to stand, Stand to sit  Transfer Device 3: Walker, Gait belt  Transfer Level of Assistance 3: Minimum assistance, Moderate verbal cues  Trials/Comments 3: attempted taking forward steps-- decreased strides/increased time due to pain. Poor tolerance.    Sitting Balance:  Static Sitting Balance  Static Sitting-Level of Assistance: Distant supervision, Close supervision  Dynamic Sitting Balance  Dynamic Sitting-Level of Assistance: Close supervision    Standing Balance:  Static Standing Balance  Static Standing-Level of Assistance: Contact guard, Minimum assistance  Dynamic Standing Balance  Dynamic Standing-Level of Assistance: Minimum assistance (x2)    IADL's:   Homemaking Responsibilities: Yes  Current License: No  Mode of Transportation: Family, Friends    Vision:   Vision - Basic Assessment  Current Vision: Wears glasses all the time    Sensation:  Light Touch: No apparent deficits    Strength:  Strength Comments: JARAD WFL-- grossly 4/4+ out of 5 throughout. B  strength good    Coordination:  Movements are Fluid and Coordinated: Yes     Hand Function:  Hand Function  Gross Grasp: Functional  Coordination: Functional    Extremities:   RUE   RUE : Within Functional Limits and LUE   LUE: Within Functional Limits    Outcome Measures:   [facility] Daily Activity  Putting on and taking off regular lower body clothing: A lot  Bathing (including washing, rinsing, drying): A lot  Putting on and taking off regular upper body clothing: A little  Toileting, which includes using toilet, bedpan or urinal: A lot  Taking care of personal grooming such as brushing teeth: A little  Eating Meals:  None  Daily Activity - Total Score: 16    Education Documentation  No documentation found.  Education Comments  No comments found.      Goals:   Encounter Problems       Encounter Problems (Active)       ADLs       Patient will perform UB and LB bathing with modified independent level of assistance and grab bars, shower chair, and long-handled sponge.       Start:  05/05/25    Expected End:  05/19/25            Patient with complete lower body dressing with modified independent level of assistance donning and doffing all LE clothes  with PRN adaptive equipment while supported sitting       Start:  05/05/25    Expected End:  05/19/25            Patient will complete daily grooming tasks with modified independent level of assistance and PRN adaptive equipment while supported sitting and standing.       Start:  05/05/25    Expected End:  05/19/25            Patient will complete toileting including hygiene clothing management/hygiene with modified independent level of assistance and raised toilet seat and grab bars.       Start:  05/05/25    Expected End:  05/19/25               BALANCE       Pt will maintain dynamic standing balance during ADL task with modified independent level of assistance in order to demonstrate decreased risk of falling and improved postural control.       Start:  05/05/25    Expected End:  05/19/25               MOBILITY          TRANSFERS       Patient will complete sit to stand transfer with modified independent level of assistance and least restrictive device in order to improve safety and prepare for out of bed mobility.       Start:  05/05/25    Expected End:  05/19/25

## 2025-05-05 NOTE — PROGRESS NOTES
Physical Therapy    Physical Therapy Treatment    Patient Name: Karina Lee  MRN: [id number]  Department: [facility]  Room: [address] [place], [place] [address]  Today's Date: 5/5/2025  Time Calculation  Start Time: 1432  Stop Time: 1455  Time Calculation (min): 23 min    Assessment/Plan   PT Assessment  PT Assessment Results: Decreased strength, Decreased endurance, Decreased range of motion, Impaired balance, Decreased mobility, Pain  Rehab Prognosis: Fair  Barriers to Discharge Home: Physical needs, Caregiver assistance  Caregiver Assistance: Patient lives alone and/or does not have reliable caregiver assistance  Physical Needs: Ambulating household distances limited by function/safety, Intermittent mobility assistance needed, High falls risk due to function or environment  Evaluation/Treatment Tolerance: Patient limited by pain  Medical Staff Made Aware: Yes  Strengths: Attitude of self  Barriers to Participation: Comorbidities  End of Session Communication: Bedside nurse, PCT/NA/CTA  Assessment Comment: Pt was able to perform STS and stand pivot with minAx1 with VC for proper transfer technique. Pt amb 1x5' using FWW with CGA, pt limited due to pain. Pt would continue to benefit from skilled therapy to address deficits and prevent further decline while hospitalized.  End of Session Patient Position: Bed, 2 rail up, Alarm on  PT Plan  Inpatient/Swing Bed or Outpatient: Swing Bed  PT Plan  Treatment/Interventions: Bed mobility, Transfer training, Gait training  PT Plan: Ongoing PT  PT Frequency: BID  PT Discharge Recommendations: Low intensity level of continued care  Equipment Recommended upon Discharge: Wheeled walker  PT Recommended Transfer Status: Assist x2  PT - OK to Discharge: Yes    General Visit Information:   PT  Visit  PT Received On: 05/05/25  General  Reason for Referral: impaired mobility and gait difficulty  Referred By: Lee Ann Garnett, WILLA-CNP  Past Medical History Relevant to Rehab: Acute URI, osteoporosis, anxiety, OA,  CVA, GERD, HTN  Family/Caregiver Present: No  Prior to Session Communication: Bedside nurse, PCT/NA/CTA  Patient Position Received:  (Up on bedside commode with call bell within reach)  Preferred Learning Style: kinesthetic, visual  General Comment: Pt was on bedside commode at the start of session and pt was agreeable to therapy. Assisted nursing staff with assistance standing during BM hygiene. Pt was cleared by RN prior to session.    Subjective   Precautions:  Precautions  Hearing/Visual Limitations: Wear hearing aid/glasses  LE Weight Bearing Status: Weight Bearing as Tolerated  Medical Precautions: Fall precautions  Precautions Comment: R hip fx non operable, angel catheter     Date/Time Vitals Session Patient Position Pulse Resp SpO2 BP MAP (mmHg)    05/05/25 1458 --  --  59  18  91 %  177/88  --          Objective   Pain:  Pain Assessment  Pain Assessment: 0-10  0-10 (Numeric) Pain Score: 4  Pain Type: Acute pain  Pain Location: Hip  Pain Orientation: Right  Pain Descriptors: Aching, Throbbing  Pain Frequency: Intermittent  Pain Onset: Progressive  Clinical Progression: Gradually worsening  Pain Interventions: Repositioned    Cognition:  Cognition  Overall Cognitive Status: Within Functional Limits  Orientation Level: Oriented X4    Coordination:  Movements are Fluid and Coordinated: Yes    Activity Tolerance:  Activity Tolerance  Endurance: Tolerates 10 - 20 min exercise with multiple rests    Treatments:  Bed Mobility  Bed Mobility: Yes  Bed Mobility 1  Bed Mobility 1: Sitting to supine  Level of Assistance 1: Moderate assistance  Bed Mobility Comments 1: 1 trial, required assistance for RLE and trunk management  Bed Mobility 2  Bed Mobility  2: Scooting  Level of Assistance 2: Close supervision  Bed Mobility Comments 2: 1 trial, scooting further back onto bed    Ambulation/Gait Training  Ambulation/Gait Training Performed: Yes  Ambulation/Gait Training 1  Surface 1: Level tile  Device 1: Rolling  walker  Gait Support Devices: Gait belt  Assistance 1: Contact guard  Quality of Gait 1: Diminished heel strike, Inconsistent stride length, Decreased step length, Forward flexed posture, Antalgic  Comments/Distance (ft) 1: 1x5', pt demonstrated difficulty advancing LLE d/t difficulty weight shifting onto RLE    Transfers  Transfer: Yes  Transfer 1  Transfer From 1: Commode-standard to  Transfer to 1: Chair with arms  Technique 1: Stand pivot  Transfer Device 1: Walker, Gait belt  Transfer Level of Assistance 1: Minimum assistance, Contact guard, +2  Transfers 2  Transfer From 2: Chair with arms to  Transfer to 2: Bed  Technique 2: Stand pivot  Transfer Device 2: Walker, Gait belt  Transfer Level of Assistance 2: Minimum assistance  Transfers 3  Transfer From 3: Chair with arms to  Transfer to 3: Stand  Technique 3: Sit to stand, Stand to sit  Transfer Device 3: Walker, Gait belt  Transfer Level of Assistance 3: Minimum assistance    Outcome Measures:  [facility] Basic Mobility  Turning from your back to your side while in a flat bed without using bedrails: A lot  Moving from lying on your back to sitting on the side of a flat bed without using bedrails: A lot  Moving to and from bed to chair (including a wheelchair): A lot  Standing up from a chair using your arms (e.g. wheelchair or bedside chair): A little  To walk in hospital room: A lot  Climbing 3-5 steps with railing: Total  Basic Mobility - Total Score: 12    Education Documentation  Precautions, taught by Tatiana Calles PTA at 5/5/2025  3:42 PM.  Learner: Patient  Readiness: Acceptance  Method: Explanation  Response: Verbalizes Understanding  Comment: transfer technique, posture when standing    Body Mechanics, taught by Tatiana Calles PTA at 5/5/2025  3:42 PM.  Learner: Patient  Readiness: Acceptance  Method: Explanation  Response: Verbalizes Understanding  Comment: transfer technique, posture when standing    Mobility Training, taught by Tatiana  NIMA Calles at 5/5/2025  3:42 PM.  Learner: Patient  Readiness: Acceptance  Method: Explanation  Response: Verbalizes Understanding  Comment: transfer technique, posture when standing    Education Comments  No comments found.      Encounter Problems       Encounter Problems (Active)       PT Problem       pt will be able to ambulate 20 ft with FWW and modified independent  (Progressing)       Start:  05/05/25    Expected End:  05/26/25            Pt will be able to navigate 1 4 inch threshold with no hand rail and modified independent to allow for safe DC.   (Progressing)       Start:  05/05/25    Expected End:  05/26/25            Pt will be able to perform STS transfer with modified independent  (Progressing)       Start:  05/05/25    Expected End:  05/26/25            pt will be able to propel self home distances with manual WC and modified independent  (Progressing)       Start:  05/05/25    Expected End:  05/26/25            Pt will demonstrate ability to  object from ground from sitting position, without a device and mod I (Progressing)       Start:  05/05/25    Expected End:  05/26/25            Pt will be able to navigate in narrow as needed w FWW to allow for entrance into home bathroom. (Progressing)       Start:  05/05/25    Expected End:  05/26/25               Pain - Adult

## 2025-05-05 NOTE — CONSULTS
"Nutrition Initial Assessment:   Nutrition Assessment    Reason for Assessment: Admission nursing screening ([facility] admission)    Patient is a 84 y.o. female presenting with complaint of a fall. The patient was ambulating to the bathroom when she passed out. She fell and hit her right hip on the ground.  CT right hip Nondisplaced fracture of the greater trochanter of the proximal right femur. She is non-surgical and expressed that she would not want surgical intervention. Patient is pain controlled and transitioned to the [facility] rehab for physical therapy.     PMH: CVA, GERD, HTN, GERD, HLD    Nutrition History:  Energy Intake: Good > 75 %  Food and Nutrient History: Pt visited in room, up in a chair eating lunch of tomato soup, crackers, and egg salad from a bowl. Pt reports fair intake due to a sore throat. Pt states she typically drinks Ensure once daily at home and would like to receive it here. Pt denies any recent wt loss. States she lives home alone. Her daughter and son-in-law does her grocery shopping and delivers them once weekly. No nutrition concerns at this time.     Skilled Unit Only  Appetite/Fluid intake:  good (%)   Feeding Ability:  independent   Dentition:  no problem present  Oral Function:  no problem present      Anthropometrics:  Height: 167.6 cm (5' 5.98\")   Weight: 89.4 kg (197 lb)   BMI (Calculated): 31.81  IBW/kg (Dietitian Calculated): 65 kg  Percent of IBW: 137 %     Weight History:   Wt Readings from Last 10 Encounters:   05/04/25 89.4 kg (197 lb)   04/29/25 89.4 kg (197 lb 0.1 oz)   12/08/24 90.7 kg (200 lb)   08/13/24 94.3 kg (208 lb)   06/30/23 91.2 kg (201 lb)      Weight Change %:  Weight History / % Weight Change: 05/04/25 - 89.4 kg; 06/30/23 - 91.2 kg  Significant Weight Loss: No    Nutrition Focused Physical Exam Findings:  Subcutaneous Fat Loss:   Defer Subcutaneous Fat Loss Assessment: Defer all  Defer All Reason: eating lunch at time of visit; good intake, no hx wt " loss  Muscle Wasting:  Defer Muscle Wasting Assessment: Defer all    Nutrition Significant Labs:  BMP Trend:   Results from last 7 days   Lab Units 05/04/25  0619 05/03/25  0705 05/02/25  0609 05/01/25  0606   GLUCOSE mg/dL 117* 110* 130* 142*   CALCIUM mg/dL 9.1 8.9 8.1* 8.4*   SODIUM mmol/L 135* 136 130* 128*   POTASSIUM mmol/L 5.1 5.3 4.8 4.7   CO2 mmol/L 29 29 25 26   CHLORIDE mmol/L 104 106 106 100   BUN mg/dL 19 20 31* 41*   CREATININE mg/dL 1.03 1.08* 1.36* 2.11*    , Renal Lab Trend:   Results from last 7 days   Lab Units 05/04/25  0619 05/03/25  0705 05/02/25  0609 05/01/25  0606 04/30/25  0608 04/29/25  1538   POTASSIUM mmol/L 5.1 5.3 4.8 4.7   < > 4.5   SODIUM mmol/L 135* 136 130* 128*   < > 132*   MAGNESIUM mg/dL  --   --   --   --   --  1.80   EGFR mL/min/1.73m*2 54* 51* 39* 23*   < > 46*   BUN mg/dL 19 20 31* 41*   < > 21   CREATININE mg/dL 1.03 1.08* 1.36* 2.11*   < > 1.18*    < > = values in this interval not displayed.     03/13/25 - HbA1c 5.6%    Nutrition Specific Medications:  aspirin, 81 mg, oral, Daily  atorvastatin, 20 mg, oral, Daily  buPROPion XL, 150 mg, oral, Daily  escitalopram, 20 mg, oral, Daily  furosemide, 20 mg, oral, Daily  lisinopril, 20 mg, oral, Daily  melatonin, 3 mg, oral, Nightly  meloxicam, 15 mg, oral, Daily  propranolol LA, 120 mg, oral, Daily  sennosides, 2 tablet, oral, Daily    I/O:   Last BM Date: 04/29/25;      Dietary Orders (From admission, onward)       Start     Ordered    05/04/25 1431  May Participate in Room Service  ( ROOM SERVICE MAY PARTICIPATE)  Once        Question:  .  Answer:  Yes    05/04/25 1430    05/04/25 1220  Adult diet Regular  Diet effective now        Question:  Diet type  Answer:  Regular    05/04/25 1219                   Estimated Needs:   Total Energy Estimated Needs in 24 hours (kCal): 1800 kCal (6599-0239 Kcal/day)  Method for Estimating Needs: 25-30 Kcal/kg IBW  Total Protein Estimated Needs in 24 Hours (g): 95 g ( gm/day)  Method  for Estimating 24 Hour Protein Needs: 1.0-1.2 gm/kg IBW  Total Fluid Estimated Needs in 24 Hours (mL): 1800 mL  Method for Estimating 24 Hour Fluid Needs: 1 mL/Kcal        Nutrition Diagnosis   Nutrition Diagnosis  Patient has Nutrition Diagnosis: No (no hx wt loss, good intake, drinks ONS once daily at home)     Nutrition Interventions/Recommendations   Nutrition prescription for oral nutrition    Nutrition Recommendations:  Individualized Nutrition Prescription Provided for : Regular diet, ensure once daily per pt request; weekly weight monitor    Nutrition Interventions/Goals:   Interventions: Medical food supplement  Medical Food Supplement: Commercial beverage medical food supplement therapy  Goal: Ensure plus high protein (350 Kcal and 20 gm protein per 8 fl oz)  Coordination of Care with Providers: Nursing, Provider (IDT meeting)      Education Documentation  N/A - regular diet       Nutrition Monitoring and Evaluation   Food/Nutrient Related History Monitoring  Additional Plans: No nutrition risk identified. Will follow and monitor per policy          Time Spent (min): 60 minutes

## 2025-05-06 DIAGNOSIS — F41.9 ANXIETY: ICD-10-CM

## 2025-05-06 PROCEDURE — 97116 GAIT TRAINING THERAPY: CPT | Mod: GP,CQ

## 2025-05-06 PROCEDURE — 2500000004 HC RX 250 GENERAL PHARMACY W/ HCPCS (ALT 636 FOR OP/ED): Mod: JZ | Performed by: NURSE PRACTITIONER

## 2025-05-06 PROCEDURE — 1900000001 HC SKILLED SWING ROOM

## 2025-05-06 PROCEDURE — 2500000002 HC RX 250 W HCPCS SELF ADMINISTERED DRUGS (ALT 637 FOR MEDICARE OP, ALT 636 FOR OP/ED): Performed by: NURSE PRACTITIONER

## 2025-05-06 PROCEDURE — 2500000001 HC RX 250 WO HCPCS SELF ADMINISTERED DRUGS (ALT 637 FOR MEDICARE OP): Performed by: NURSE PRACTITIONER

## 2025-05-06 PROCEDURE — 97535 SELF CARE MNGMENT TRAINING: CPT | Mod: GO

## 2025-05-06 PROCEDURE — 94760 N-INVAS EAR/PLS OXIMETRY 1: CPT

## 2025-05-06 PROCEDURE — 2500000005 HC RX 250 GENERAL PHARMACY W/O HCPCS: Performed by: NURSE PRACTITIONER

## 2025-05-06 PROCEDURE — 97530 THERAPEUTIC ACTIVITIES: CPT | Mod: GP,CQ

## 2025-05-06 RX ORDER — ESCITALOPRAM OXALATE 20 MG/1
20 TABLET ORAL DAILY
Qty: 30 TABLET | Refills: 0 | Status: SHIPPED | OUTPATIENT
Start: 2025-05-06

## 2025-05-06 RX ADMIN — SENNOSIDES 17.2 MG: 8.6 TABLET, FILM COATED ORAL at 09:05

## 2025-05-06 RX ADMIN — ESCITALOPRAM OXALATE 20 MG: 10 TABLET, FILM COATED ORAL at 20:05

## 2025-05-06 RX ADMIN — PROPRANOLOL HYDROCHLORIDE 120 MG: 60 CAPSULE, EXTENDED RELEASE ORAL at 09:05

## 2025-05-06 RX ADMIN — AMOXICILLIN AND CLAVULANATE POTASSIUM 1 TABLET: 875; 125 TABLET, FILM COATED ORAL at 20:05

## 2025-05-06 RX ADMIN — BUPROPION HYDROCHLORIDE 150 MG: 150 TABLET, EXTENDED RELEASE ORAL at 20:05

## 2025-05-06 RX ADMIN — BENZOCAINE AND MENTHOL 1 LOZENGE: 15; 3.6 LOZENGE ORAL at 13:16

## 2025-05-06 RX ADMIN — AMOXICILLIN AND CLAVULANATE POTASSIUM 1 TABLET: 875; 125 TABLET, FILM COATED ORAL at 09:05

## 2025-05-06 RX ADMIN — FUROSEMIDE 20 MG: 20 TABLET ORAL at 09:05

## 2025-05-06 RX ADMIN — MELATONIN TAB 3 MG 3 MG: 3 TAB at 20:04

## 2025-05-06 RX ADMIN — MELOXICAM 15 MG: 7.5 TABLET ORAL at 09:05

## 2025-05-06 RX ADMIN — ENOXAPARIN SODIUM 40 MG: 40 INJECTION SUBCUTANEOUS at 09:05

## 2025-05-06 RX ADMIN — BENZOCAINE AND MENTHOL 1 LOZENGE: 15; 3.6 LOZENGE ORAL at 20:04

## 2025-05-06 RX ADMIN — LISINOPRIL 20 MG: 20 TABLET ORAL at 09:05

## 2025-05-06 RX ADMIN — ATORVASTATIN CALCIUM 20 MG: 10 TABLET, FILM COATED ORAL at 20:05

## 2025-05-06 RX ADMIN — OXYCODONE 10 MG: 5 TABLET ORAL at 20:05

## 2025-05-06 RX ADMIN — ASPIRIN 81 MG: 81 TABLET, CHEWABLE ORAL at 20:04

## 2025-05-06 ASSESSMENT — COGNITIVE AND FUNCTIONAL STATUS - GENERAL
TOILETING: A LOT
TURNING FROM BACK TO SIDE WHILE IN FLAT BAD: A LITTLE
MOVING FROM LYING ON BACK TO SITTING ON SIDE OF FLAT BED WITH BEDRAILS: A LOT
MOVING FROM LYING ON BACK TO SITTING ON SIDE OF FLAT BED WITH BEDRAILS: A LOT
TURNING FROM BACK TO SIDE WHILE IN FLAT BAD: A LITTLE
PERSONAL GROOMING: A LITTLE
HELP NEEDED FOR BATHING: A LITTLE
PERSONAL GROOMING: A LITTLE
MOVING TO AND FROM BED TO CHAIR: A LITTLE
TOILETING: A LITTLE
MOVING FROM LYING ON BACK TO SITTING ON SIDE OF FLAT BED WITH BEDRAILS: A LITTLE
DAILY ACTIVITIY SCORE: 19
CLIMB 3 TO 5 STEPS WITH RAILING: A LOT
HELP NEEDED FOR BATHING: A LITTLE
CLIMB 3 TO 5 STEPS WITH RAILING: A LOT
WALKING IN HOSPITAL ROOM: A LOT
MOBILITY SCORE: 12
STANDING UP FROM CHAIR USING ARMS: A LITTLE
DRESSING REGULAR LOWER BODY CLOTHING: A LITTLE
DRESSING REGULAR UPPER BODY CLOTHING: A LITTLE
TOILETING: A LITTLE
MOVING FROM LYING ON BACK TO SITTING ON SIDE OF FLAT BED WITH BEDRAILS: A LITTLE
MOVING TO AND FROM BED TO CHAIR: A LOT
WALKING IN HOSPITAL ROOM: A LITTLE
DRESSING REGULAR UPPER BODY CLOTHING: A LITTLE
WALKING IN HOSPITAL ROOM: A LOT
DRESSING REGULAR LOWER BODY CLOTHING: A LITTLE
STANDING UP FROM CHAIR USING ARMS: A LITTLE
TURNING FROM BACK TO SIDE WHILE IN FLAT BAD: A LOT
DAILY ACTIVITIY SCORE: 19
STANDING UP FROM CHAIR USING ARMS: A LITTLE
CLIMB 3 TO 5 STEPS WITH RAILING: TOTAL
MOBILITY SCORE: 12
MOBILITY SCORE: 17
MOVING TO AND FROM BED TO CHAIR: A LOT
DAILY ACTIVITIY SCORE: 16
MOVING TO AND FROM BED TO CHAIR: A LITTLE
TURNING FROM BACK TO SIDE WHILE IN FLAT BAD: A LOT
CLIMB 3 TO 5 STEPS WITH RAILING: TOTAL
DRESSING REGULAR UPPER BODY CLOTHING: A LITTLE
HELP NEEDED FOR BATHING: A LOT
MOBILITY SCORE: 17
WALKING IN HOSPITAL ROOM: A LITTLE
STANDING UP FROM CHAIR USING ARMS: A LITTLE
PERSONAL GROOMING: A LITTLE
DRESSING REGULAR LOWER BODY CLOTHING: A LOT

## 2025-05-06 ASSESSMENT — ACTIVITIES OF DAILY LIVING (ADL): HOME_MANAGEMENT_TIME_ENTRY: 47

## 2025-05-06 ASSESSMENT — PAIN DESCRIPTION - LOCATION: LOCATION: HIP

## 2025-05-06 ASSESSMENT — PAIN - FUNCTIONAL ASSESSMENT
PAIN_FUNCTIONAL_ASSESSMENT: 0-10

## 2025-05-06 ASSESSMENT — PAIN SCALES - GENERAL
PAINLEVEL_OUTOF10: 7
PAINLEVEL_OUTOF10: 0 - NO PAIN
PAINLEVEL_OUTOF10: 5 - MODERATE PAIN
PAINLEVEL_OUTOF10: 10 - WORST POSSIBLE PAIN
PAINLEVEL_OUTOF10: 10 - WORST POSSIBLE PAIN

## 2025-05-06 ASSESSMENT — PAIN DESCRIPTION - DESCRIPTORS
DESCRIPTORS: ACHING
DESCRIPTORS: ACHING;THROBBING

## 2025-05-06 ASSESSMENT — PAIN DESCRIPTION - ORIENTATION: ORIENTATION: RIGHT

## 2025-05-06 NOTE — CARE PLAN
The patient's goals for the shift include      The clinical goals for the shift include Patient will use call bell for assistance.    Over the shift, the patient did not make progress toward the following goals. Patient used call bell for assistance.

## 2025-05-06 NOTE — PROGRESS NOTES
Physical Therapy    Physical Therapy Treatment    Patient Name: Karina Lee  MRN: [id number]  Department: [facility]  Room: [address] [place] [address]  Today's Date: 5/6/2025  Time Calculation  Start Time: 1024  Stop Time: 1055  Time Calculation (min): 31 min         Assessment/Plan   PT Assessment  Rehab Prognosis: Fair  Barriers to Discharge Home: Physical needs, Caregiver assistance  Caregiver Assistance: Patient lives alone and/or does not have reliable caregiver assistance  Physical Needs: Ambulating household distances limited by function/safety, Intermittent mobility assistance needed, High falls risk due to function or environment  PT Plan  Inpatient/Swing Bed or Outpatient: Swing Bed  PT Plan  Treatment/Interventions: Bed mobility, Transfer training, Gait training  PT Plan: Ongoing PT  PT Frequency: BID  PT Discharge Recommendations: Low intensity level of continued care  Equipment Recommended upon Discharge: Wheeled walker  PT Recommended Transfer Status: Assist x2  PT - OK to Discharge: Yes      General Visit Information:   PT  Visit  PT Received On: 05/06/25  General  Reason for Referral: Impaired mobility  Referred By: WILLA Fitzpatrick-CNP  Past Medical History Relevant to Rehab: Acute URI, osteoporosis, anxiety, OA, CVA, GERD, HTN  Prior to Session Communication: Bedside nurse, PCT/NA/CTA  Patient Position Received: Up in chair, Alarm on  Preferred Learning Style: visual, verbal    Subjective   Precautions:  Precautions  LE Weight Bearing Status: Weight Bearing as Tolerated  Medical Precautions: Fall precautions  Precautions Comment: R hip fx non operable, angel catheter    Objective   Pain:  Pain Assessment  Pain Assessment: 0-10  0-10 (Numeric) Pain Score: 10 - Worst possible pain  Pain Type: Acute pain  Pain Location: Hip  Pain Orientation: Right  Pain Descriptors: Aching  Pain Interventions: Medication (See MAR), Distraction, Rest  Cognition:  Cognition  Orientation Level: Oriented X4  Coordination:  Movements are  Fluid and Coordinated: Yes  Postural Control:  Postural Control  Postural Control: Within Functional Limits  Static Sitting Balance  Static Sitting-Level of Assistance: Close supervision  Dynamic Sitting Balance  Dynamic Sitting-Level of Assistance: Close supervision  Static Standing Balance  Static Standing-Level of Assistance: Close supervision  Dynamic Standing Balance  Dynamic Standing-Level of Assistance: Minimum assistance  Extremity/Trunk Assessments:    Activity Tolerance:  Activity Tolerance  Endurance: Tolerates 10 - 20 min exercise with multiple rests  Treatments:  Therapeutic Exercise  Therapeutic Exercise Performed: Yes  Therapeutic Exercise Activity 1: Standing marches 2x5    Transfers  Transfer: Yes  Transfer 1  Transfer From 1: Sit to  Transfer to 1: Stand  Technique 1: Sit to stand, Stand to sit  Transfer Device 1: Walker, Gait belt  Transfer Level of Assistance 1: Minimum assistance, Contact guard, +2  Trials/Comments 1: STS x 10    Outcome Measures:  [facility] Basic Mobility  Turning from your back to your side while in a flat bed without using bedrails: A lot  Moving from lying on your back to sitting on the side of a flat bed without using bedrails: A lot  Moving to and from bed to chair (including a wheelchair): A lot  Standing up from a chair using your arms (e.g. wheelchair or bedside chair): A little  To walk in hospital room: A lot  Climbing 3-5 steps with railing: Total  Basic Mobility - Total Score: 12      Encounter Problems       Encounter Problems (Active)       PT Problem       pt will be able to ambulate 20 ft with FWW and modified independent  (Progressing)       Start:  05/05/25    Expected End:  05/26/25            Pt will be able to navigate 1 4 inch threshold with no hand rail and modified independent to allow for safe DC.   (Progressing)       Start:  05/05/25    Expected End:  05/26/25            Pt will be able to perform STS transfer with modified independent  (Progressing)        Start:  05/05/25    Expected End:  05/26/25            pt will be able to propel self home distances with manual WC and modified independent  (Progressing)       Start:  05/05/25    Expected End:  05/26/25            Pt will demonstrate ability to  object from ground from sitting position, without a device and mod I (Progressing)       Start:  05/05/25    Expected End:  05/26/25            Pt will be able to navigate in narrow as needed w FWW to allow for entrance into home bathroom. (Progressing)       Start:  05/05/25    Expected End:  05/26/25               Pain - Adult

## 2025-05-06 NOTE — NURSING NOTE
Participated in therapy today. Remained safe from falls/ injury during shift. Appetite good. Rested in bed after afternoon therapy today.

## 2025-05-06 NOTE — PROGRESS NOTES
Physical Therapy    Physical Therapy Treatment    Patient Name: Karina Lee  MRN: [id number]  Department: [facility]  Room: [address] [place] [address]  Today's Date: 5/6/2025  Time Calculation  Start Time: 1255  Stop Time: 1320  Time Calculation (min): 25 min    Assessment/Plan   PT Assessment  PT Assessment Results: Decreased strength, Decreased endurance, Decreased range of motion, Impaired balance, Decreased mobility, Pain  Rehab Prognosis: Fair  Barriers to Discharge Home: Physical needs, Caregiver assistance  Caregiver Assistance: Patient lives alone and/or does not have reliable caregiver assistance  Physical Needs: Ambulating household distances limited by function/safety, Intermittent mobility assistance needed, High falls risk due to function or environment  End of Session Communication: Bedside nurse, PCT/NA/PRATIMA  End of Session Patient Position: Bed, 2 rail up, Alarm on  PT Plan  Inpatient/Swing Bed or Outpatient: Swing Bed  PT Plan  Treatment/Interventions: Bed mobility, Transfer training, Gait training  PT Plan: Ongoing PT  PT Frequency: BID  PT Discharge Recommendations: Low intensity level of continued care  Equipment Recommended upon Discharge: Wheeled walker  PT Recommended Transfer Status: Assist x2  PT - OK to Discharge: Yes      General Visit Information:   PT  Visit  PT Received On: 05/06/25  General  Reason for Referral: Impaired mobility  Referred By: WILLA Fitzpatrick-CNP  Past Medical History Relevant to Rehab: Acute URI, osteoporosis, anxiety, OA, CVA, GERD, HTN  Prior to Session Communication: Bedside nurse, PCT/NA/PRATIMA  Patient Position Received: Up in chair, Alarm on  Preferred Learning Style: visual, verbal    Subjective   Precautions:  Precautions  LE Weight Bearing Status: Weight Bearing as Tolerated  Medical Precautions: Fall precautions  Precautions Comment: R hip fx non operable, angel catheter    Objective   Pain:  Pain Assessment  Pain Assessment: 0-10  0-10 (Numeric) Pain Score: 10 - Worst  possible pain  Pain Type: Acute pain  Pain Location: Hip  Pain Orientation: Right  Pain Descriptors: Aching  Pain Interventions: Medication (See MAR), Distraction, Rest  Cognition:  Cognition  Orientation Level: Oriented X4  Coordination:  Movements are Fluid and Coordinated: Yes  Postural Control:  Postural Control  Postural Control: Within Functional Limits  Static Sitting Balance  Static Sitting-Level of Assistance: Close supervision  Dynamic Sitting Balance  Dynamic Sitting-Level of Assistance: Close supervision  Static Standing Balance  Static Standing-Level of Assistance: Close supervision  Dynamic Standing Balance  Dynamic Standing-Level of Assistance: Minimum assistance  Activity Tolerance:  Activity Tolerance  Endurance: Tolerates 10 - 20 min exercise with multiple rests  Treatments:  Therapeutic Exercise  Therapeutic Exercise Performed: Yes  Therapeutic Exercise Activity 1: Standing marches 2x5    Bed Mobility  Bed Mobility: Yes  Bed Mobility 1  Bed Mobility 1: Sitting to supine  Level of Assistance 1: Minimum assistance  Bed Mobility Comments 1: Min assist with LEs    Ambulation/Gait Training  Ambulation/Gait Training Performed: Yes  Ambulation/Gait Training 1  Surface 1: Level tile  Device 1: Rolling walker  Gait Support Devices: Gait belt  Assistance 1: Contact guard  Quality of Gait 1: Inconsistent stride length  Comments/Distance (ft) 1: 8 ft x 3 across room  Transfers  Transfer: Yes  Transfer 1  Transfer From 1: Sit to  Transfer to 1: Stand  Technique 1: Sit to stand, Stand to sit  Transfer Device 1: Walker, Gait belt  Transfer Level of Assistance 1: Minimum assistance, Contact guard, +2  Trials/Comments 1: x5  Transfers 2  Transfer From 2: Chair with arms to  Transfer to 2: Bed  Technique 2: Stand pivot, To right  Transfer Device 2: Walker, Gait belt  Transfer Level of Assistance 2: Minimum assistance, +2    Outcome Measures:  [facility] Basic Mobility  Turning from your back to your side while in a flat  bed without using bedrails: A lot  Moving from lying on your back to sitting on the side of a flat bed without using bedrails: A lot  Moving to and from bed to chair (including a wheelchair): A lot  Standing up from a chair using your arms (e.g. wheelchair or bedside chair): A little  To walk in hospital room: A lot  Climbing 3-5 steps with railing: Total  Basic Mobility - Total Score: 12    Encounter Problems       Encounter Problems (Active)       PT Problem       pt will be able to ambulate 20 ft with FWW and modified independent  (Progressing)       Start:  05/05/25    Expected End:  05/26/25            Pt will be able to navigate 1 4 inch threshold with no hand rail and modified independent to allow for safe DC.   (Progressing)       Start:  05/05/25    Expected End:  05/26/25            Pt will be able to perform STS transfer with modified independent  (Progressing)       Start:  05/05/25    Expected End:  05/26/25            pt will be able to propel self home distances with manual WC and modified independent  (Progressing)       Start:  05/05/25    Expected End:  05/26/25            Pt will demonstrate ability to  object from ground from sitting position, without a device and mod I (Progressing)       Start:  05/05/25    Expected End:  05/26/25            Pt will be able to navigate in narrow as needed w FWW to allow for entrance into home bathroom. (Progressing)       Start:  05/05/25    Expected End:  05/26/25               Pain - Adult

## 2025-05-06 NOTE — PROGRESS NOTES
Occupational Therapy    Occupational Therapy Treatment    Name: Karina Lee  MRN: [id number]  Department: [facility]  Room: [address] [place] [address]  Date: 05/06/25  Time Calculation  Start Time: 0908  Stop Time: 0955  Time Calculation (min): 47 min    Assessment:  OT Assessment: RN cleared pt. At this date, pt demonstrates improvements with functional mobility including increased ease with STS and improved tolerance when performing SPT to complete transfer to another surface. Pt continues to be limited by pain as observed throughout session and does limit pt. Will continue to work towards current goals to improve QOL and increase safety prior to d/c home. Per [facility], dtr's planning on reconfiguring living situation as pt currently lives alone. Dtr's will be setting up schedule to provide increased support/care for pt prior to return home. Important to consider at time of d/c. At end, pt positioned into chair with alarm on. Call light in reach, needs met. RN notified.  Prognosis: Good  Barriers to Discharge Home: Caregiver assistance, Physical needs  Caregiver Assistance: Patient lives alone and/or does not have reliable caregiver assistance  Physical Needs: Ambulating household distances limited by function/safety, 24hr mobility assistance needed, Intermittent ADL assistance needed, High falls risk due to function or environment  Evaluation/Treatment Tolerance: Patient tolerated treatment well, Patient limited by pain  Medical Staff Made Aware: Yes  End of Session Communication: Bedside nurse, PCT/NA/CTA  End of Session Patient Position: Up in chair, Alarm on    Plan:  OT - OK to Discharge: Yes    Subjective   Previous Visit Info:  OT Last Visit  OT Received On: 05/06/25    General:  General  Reason for Referral: Impaired ADLs  Referred By: Lee Ann Garnett, APRN-CNP  Past Medical History Relevant to Rehab: Acute URI, osteoporosis, anxiety, OA, CVA, GERD, HTN  Family/Caregiver Present: No  Prior to Session Communication: Bedside nurse,  PCT/NA/CTA  Patient Position Received: Up in chair, Alarm on  Preferred Learning Style: kinesthetic, visual  General Comment: Pt seated up in chair, agreeable to work with therapy. States she already got washed but would like to get dressed and do grooming tasks.    Precautions:  Hearing/Visual Limitations: Wear hearing aid/glasses  LE Weight Bearing Status: Weight Bearing as Tolerated  Medical Precautions: Fall precautions  Precautions Comment: R hip fx non operable, angel catheter    Pain Assessment:  Pain Assessment  Pain Assessment: 0-10  0-10 (Numeric) Pain Score: 5 - Moderate pain  Pain Type: Acute pain  Pain Location: Hip  Pain Orientation: Right  Pain Descriptors: Aching, Throbbing  Pain Frequency: Constant/continuous  Pain Onset: Ongoing  Clinical Progression: Gradually worsening  Pain Interventions: Repositioned  Response to Interventions: Content/relaxed    Objective   Cognition:  Overall Cognitive Status: Within Functional Limits  Orientation Level: Oriented X4    Activities of Daily Living:   Grooming  Grooming Level of Assistance: Setup, Distant supervision  Grooming Where Assessed: Recliner  Grooming Comments: completed oral/denture care, wash face, application of deodorant, hair care from a support sit position. Goal to progress to sink, support sit.    LE Dressing  LE Dressing: Yes  Sock Level of Assistance: Minimum assistance  Adult Briefs Level of Assistance: Dependent, Moderate assistance  LE Dressing Where Assessed: Bedside commode (sit and stand position)  LE Dressing Comments: in stand at commode, total A to doff (therapist ripped off) as pt required BUE support on WW for balance. Grady Mod A with reacher. Increased fatigue requiring increased A.    Bed Mobility/Transfers:   Transfers  Transfer: Yes  Transfer 1  Transfer From 1: Sit to  Transfer to 1: Stand  Technique 1: Sit to stand, Stand to sit  Transfer Device 1: Walker, Gait belt  Transfer Level of Assistance 1: Minimum assistance, Minimal  verbal cues  Trials/Comments 1: 3 trials, consistent SELENE with increased time with initiation, cues provided to 'tuck bottom' for increased upright posture.  Transfers 2  Transfer From 2: Stand to  Transfer to 2: Commode-standard  Technique 2: Stand pivot, To left  Transfer Device 2: Walker, Gait belt  Transfer Level of Assistance 2: Minimum assistance, Minimal verbal cues  Trials/Comments 2: Cues for AD use and progression however improved from last visit    Toilet Transfers  Toilet Transfer From: Chair  Toilet Transfer Type: To and from  Toilet Transfer to: Extra wide bedside commode  Toilet Transfer Technique: Stand pivot  Toilet Transfers: Minimal assistance    Sitting Balance:  Static Sitting Balance  Static Sitting-Level of Assistance: Distant supervision, Close supervision  Dynamic Sitting Balance  Dynamic Sitting-Level of Assistance: Close supervision    Standing Balance:  Static Standing Balance  Static Standing-Level of Assistance: Contact guard, Minimum assistance  Dynamic Standing Balance  Dynamic Standing-Level of Assistance: Minimum assistance    Outcome Measures:  [facility] Daily Activity  Putting on and taking off regular lower body clothing: A lot  Bathing (including washing, rinsing, drying): A lot  Putting on and taking off regular upper body clothing: A little  Toileting, which includes using toilet, bedpan or urinal: A lot  Taking care of personal grooming such as brushing teeth: A little  Eating Meals: None  Daily Activity - Total Score: 16    Education Documentation  No documentation found.  Education Comments  No comments found.      Goals:  Encounter Problems       Encounter Problems (Active)       ADLs       Patient will perform UB and LB bathing with modified independent level of assistance and grab bars, shower chair, and long-handled sponge.       Start:  05/05/25    Expected End:  05/19/25            Patient with complete lower body dressing with modified independent level of assistance  donning and doffing all LE clothes  with PRN adaptive equipment while supported sitting (Progressing)       Start:  05/05/25    Expected End:  05/19/25            Patient will complete daily grooming tasks with modified independent level of assistance and PRN adaptive equipment while supported sitting and standing. (Progressing)       Start:  05/05/25    Expected End:  05/19/25            Patient will complete toileting including hygiene clothing management/hygiene with modified independent level of assistance and raised toilet seat and grab bars.       Start:  05/05/25    Expected End:  05/19/25               BALANCE       Pt will maintain dynamic standing balance during ADL task with modified independent level of assistance in order to demonstrate decreased risk of falling and improved postural control. (Progressing)       Start:  05/05/25    Expected End:  05/19/25               MOBILITY          TRANSFERS       Patient will complete sit to stand transfer with modified independent level of assistance and least restrictive device in order to improve safety and prepare for out of bed mobility. (Progressing)       Start:  05/05/25    Expected End:  05/19/25

## 2025-05-07 PROCEDURE — 2500000002 HC RX 250 W HCPCS SELF ADMINISTERED DRUGS (ALT 637 FOR MEDICARE OP, ALT 636 FOR OP/ED): Performed by: NURSE PRACTITIONER

## 2025-05-07 PROCEDURE — 97116 GAIT TRAINING THERAPY: CPT | Mod: GP,CQ

## 2025-05-07 PROCEDURE — 97530 THERAPEUTIC ACTIVITIES: CPT | Mod: GP,CQ

## 2025-05-07 PROCEDURE — 2500000005 HC RX 250 GENERAL PHARMACY W/O HCPCS: Performed by: NURSE PRACTITIONER

## 2025-05-07 PROCEDURE — 2500000004 HC RX 250 GENERAL PHARMACY W/ HCPCS (ALT 636 FOR OP/ED): Mod: JZ | Performed by: NURSE PRACTITIONER

## 2025-05-07 PROCEDURE — 97535 SELF CARE MNGMENT TRAINING: CPT | Mod: GO

## 2025-05-07 PROCEDURE — 2500000001 HC RX 250 WO HCPCS SELF ADMINISTERED DRUGS (ALT 637 FOR MEDICARE OP): Performed by: NURSE PRACTITIONER

## 2025-05-07 PROCEDURE — 94760 N-INVAS EAR/PLS OXIMETRY 1: CPT

## 2025-05-07 PROCEDURE — 1900000001 HC SKILLED SWING ROOM

## 2025-05-07 RX ADMIN — PROPRANOLOL HYDROCHLORIDE 120 MG: 60 CAPSULE, EXTENDED RELEASE ORAL at 09:28

## 2025-05-07 RX ADMIN — LISINOPRIL 20 MG: 20 TABLET ORAL at 09:28

## 2025-05-07 RX ADMIN — FUROSEMIDE 20 MG: 20 TABLET ORAL at 09:28

## 2025-05-07 RX ADMIN — ATORVASTATIN CALCIUM 20 MG: 10 TABLET, FILM COATED ORAL at 20:38

## 2025-05-07 RX ADMIN — ESCITALOPRAM OXALATE 20 MG: 10 TABLET, FILM COATED ORAL at 20:38

## 2025-05-07 RX ADMIN — ENOXAPARIN SODIUM 40 MG: 40 INJECTION SUBCUTANEOUS at 09:28

## 2025-05-07 RX ADMIN — ASPIRIN 81 MG: 81 TABLET, CHEWABLE ORAL at 20:38

## 2025-05-07 RX ADMIN — BUPROPION HYDROCHLORIDE 150 MG: 150 TABLET, EXTENDED RELEASE ORAL at 20:38

## 2025-05-07 RX ADMIN — MELOXICAM 15 MG: 7.5 TABLET ORAL at 09:28

## 2025-05-07 RX ADMIN — AMOXICILLIN AND CLAVULANATE POTASSIUM 1 TABLET: 875; 125 TABLET, FILM COATED ORAL at 09:28

## 2025-05-07 RX ADMIN — MELATONIN TAB 3 MG 3 MG: 3 TAB at 20:38

## 2025-05-07 RX ADMIN — SENNOSIDES 17.2 MG: 8.6 TABLET, FILM COATED ORAL at 09:28

## 2025-05-07 RX ADMIN — BENZOCAINE AND MENTHOL 1 LOZENGE: 15; 3.6 LOZENGE ORAL at 20:01

## 2025-05-07 RX ADMIN — OXYCODONE 10 MG: 5 TABLET ORAL at 19:53

## 2025-05-07 RX ADMIN — BENZOCAINE AND MENTHOL 1 LOZENGE: 15; 3.6 LOZENGE ORAL at 09:43

## 2025-05-07 ASSESSMENT — COGNITIVE AND FUNCTIONAL STATUS - GENERAL
WALKING IN HOSPITAL ROOM: A LITTLE
HELP NEEDED FOR BATHING: A LITTLE
HELP NEEDED FOR BATHING: A LITTLE
STANDING UP FROM CHAIR USING ARMS: A LITTLE
MOVING FROM LYING ON BACK TO SITTING ON SIDE OF FLAT BED WITH BEDRAILS: A LITTLE
DRESSING REGULAR LOWER BODY CLOTHING: A LITTLE
DRESSING REGULAR UPPER BODY CLOTHING: A LITTLE
DAILY ACTIVITIY SCORE: 19
WALKING IN HOSPITAL ROOM: A LOT
DAILY ACTIVITIY SCORE: 18
TURNING FROM BACK TO SIDE WHILE IN FLAT BAD: A LITTLE
STANDING UP FROM CHAIR USING ARMS: A LITTLE
MOBILITY SCORE: 15
CLIMB 3 TO 5 STEPS WITH RAILING: TOTAL
CLIMB 3 TO 5 STEPS WITH RAILING: A LITTLE
STANDING UP FROM CHAIR USING ARMS: A LITTLE
MOBILITY SCORE: 18
TURNING FROM BACK TO SIDE WHILE IN FLAT BAD: A LITTLE
PERSONAL GROOMING: A LITTLE
MOVING TO AND FROM BED TO CHAIR: A LITTLE
MOVING TO AND FROM BED TO CHAIR: A LITTLE
TURNING FROM BACK TO SIDE WHILE IN FLAT BAD: A LITTLE
MOBILITY SCORE: 15
MOVING FROM LYING ON BACK TO SITTING ON SIDE OF FLAT BED WITH BEDRAILS: A LITTLE
TOILETING: A LITTLE
WALKING IN HOSPITAL ROOM: A LOT
MOVING TO AND FROM BED TO CHAIR: A LITTLE
DRESSING REGULAR UPPER BODY CLOTHING: A LITTLE
PERSONAL GROOMING: A LITTLE
TOILETING: A LOT
DRESSING REGULAR LOWER BODY CLOTHING: A LITTLE
MOVING FROM LYING ON BACK TO SITTING ON SIDE OF FLAT BED WITH BEDRAILS: A LITTLE
CLIMB 3 TO 5 STEPS WITH RAILING: TOTAL

## 2025-05-07 ASSESSMENT — PAIN DESCRIPTION - DESCRIPTORS
DESCRIPTORS: ACHING
DESCRIPTORS: ACHING

## 2025-05-07 ASSESSMENT — PAIN SCALES - GENERAL
PAINLEVEL_OUTOF10: 0 - NO PAIN
PAINLEVEL_OUTOF10: 5 - MODERATE PAIN
PAINLEVEL_OUTOF10: 7
PAINLEVEL_OUTOF10: 5 - MODERATE PAIN
PAINLEVEL_OUTOF10: 9

## 2025-05-07 ASSESSMENT — PAIN - FUNCTIONAL ASSESSMENT
PAIN_FUNCTIONAL_ASSESSMENT: 0-10

## 2025-05-07 ASSESSMENT — PAIN DESCRIPTION - LOCATION: LOCATION: HIP

## 2025-05-07 ASSESSMENT — ACTIVITIES OF DAILY LIVING (ADL): HOME_MANAGEMENT_TIME_ENTRY: 44

## 2025-05-07 ASSESSMENT — PAIN DESCRIPTION - ORIENTATION: ORIENTATION: RIGHT

## 2025-05-07 NOTE — PROGRESS NOTES
Occupational Therapy                 Therapy Communication Note    Patient Name: Karina Lee  MRN: [id number]  Department: [facility]  Room: [address] [place] [address]A  Today's Date: 5/7/2025     Discipline: Occupational Therapy    OT Missed Visit: Yes     Missed Visit Reason: Missed Visit Reason: Other (Comment)    Missed Time: Attempt    Comment: Upon entrance into pt's room, pt in recliner and asleep. Per NA, just was in room and pt requesting to rest as she was pretty fatigued. Will return in afternoon for planned sponge bath at sink- already communicated with pt and she is agreeable. RN notified.

## 2025-05-07 NOTE — PROGRESS NOTES
05/07/25 1230   Discharge Planning   Living Arrangements Children   Support Systems Children   Assistance Needed Pt family cares for her at home. Pt will Dc to home when done at Swing unit with HH in place.   Type of Residence Private residence   Who is requesting discharge planning? Patient   Home or Post Acute Services In home services   Type of Home Care Services Home OT;Home PT   Expected Discharge Disposition Home H   Patient Choice   Provider Choice list and CMS website (https://medicare.gov/care-compare#search) for post-acute Quality and Resource Measure Data were provided and reviewed with: Patient   Patient / Family choosing to utilize agency / facility established prior to hospitalization No   Stroke Family Assessment   Stroke Family Assessment Needed No   Intensity of Service   Intensity of Service 0-30 min     JENNIFER OLIVAS

## 2025-05-07 NOTE — PROGRESS NOTES
Physical Therapy    Physical Therapy Treatment    Patient Name: Karina Lee  MRN: [id number]  Department: [facility]  Room: [address] [place], [place] [address]  Today's Date: 5/7/2025  Time Calculation  Start Time: 0911  Stop Time: 0948  Time Calculation (min): 37 min    Assessment/Plan   PT Assessment  PT Assessment Results: Decreased strength, Decreased endurance, Decreased range of motion, Impaired balance, Decreased mobility, Pain  Rehab Prognosis: Fair  Barriers to Discharge Home: Physical needs, Caregiver assistance  Caregiver Assistance: Patient lives alone and/or does not have reliable caregiver assistance  Physical Needs: Ambulating household distances limited by function/safety, Intermittent mobility assistance needed, High falls risk due to function or environment  Evaluation/Treatment Tolerance: Patient limited by pain  Medical Staff Made Aware: Yes  Strengths: Attitude of self, Ability to acquire knowledge  Barriers to Participation: Comorbidities  End of Session Communication: Bedside nurse, PCT/NA/CTA  Assessment Comment: Pt was able to amb 3 x 8' using FWW with CGA; pt demonstrated an increase in WB on RLE but still limitated at this time. Pt demonstrated improvement with STS by requiring CGA and VC for sequencing. Pt was limited in activities due to R hip pain increasing throughout session. Pt would continue to benefit from skilled therapy to address above noted limitations and prevent further decline while hospitalized.  End of Session Patient Position: Up in chair, Alarm on  PT Plan  Inpatient/Swing Bed or Outpatient: Swing Bed  PT Plan  Treatment/Interventions: Transfer training, Gait training  PT Plan: Ongoing PT  PT Frequency: BID  PT Discharge Recommendations: Low intensity level of continued care  Equipment Recommended upon Discharge: Wheeled walker  PT Recommended Transfer Status: Assist x1  PT - OK to Discharge: Yes    General Visit Information:   PT  Visit  PT Received On: 05/07/25  General  Reason for Referral: Impaired  mobility  Referred By: Lee Ann Garnett, APRN-CNP  Past Medical History Relevant to Rehab: Acute URI, osteoporosis, anxiety, OA, CVA, GERD, HTN  Family/Caregiver Present: No  Prior to Session Communication: PCT/NA/CTA  Patient Position Received: Up in chair, Alarm on  Preferred Learning Style: visual, verbal  General Comment: Pt was agreeable to therapy. Pt was cleared by nursing staff prior to session. Pt stated that she was expeiencing needlike pain in throat at the start of session and that it was painful to swallow food or water; nurse was notified.    Subjective   Precautions:  Precautions  Hearing/Visual Limitations: Wear hearing aid/glasses  LE Weight Bearing Status: Weight Bearing as Tolerated  Medical Precautions: Fall precautions  Precautions Comment: R hip fx non operable, angel catheter     Date/Time Vitals Session Patient Position Pulse Resp SpO2 BP MAP (mmHg)    05/07/25 0907 --  --  --  --  94 %  --  --           Objective   Pain:  Pain Assessment  Pain Assessment: 0-10  0-10 (Numeric) Pain Score: 5 - Moderate pain (pain increased with ambulation to a 9/10)  Pain Type: Acute pain  Pain Location: Hip  Pain Orientation: Right  Response to Interventions: Increase in pain  Cognition:  Cognition  Overall Cognitive Status: Within Functional Limits  Orientation Level: Oriented X4  Coordination:  Movements are Fluid and Coordinated: Yes  Activity Tolerance:  Activity Tolerance  Endurance: Tolerates 30 min exercise with multiple rests    Treatments:  Ambulation/Gait Training  Ambulation/Gait Training Performed: Yes  Ambulation/Gait Training 1  Surface 1: Level tile  Device 1: Rolling walker  Gait Support Devices: Gait belt  Assistance 1: Contact guard  Quality of Gait 1: Antalgic, Inconsistent stride length, Decreased step length  Comments/Distance (ft) 1: 3 x 8' with extended seated rest breaks in between sets due to pain    Transfers  Transfer: Yes  Transfer 1  Transfer From 1: Sit to  Transfer to 1:  Stand  Technique 1: Sit to stand, Stand to sit  Transfer Device 1: Walker, Gait belt  Transfer Level of Assistance 1: Contact guard, Minimal verbal cues  Trials/Comments 1: x4    Outcome Measures:  [facility] Basic Mobility  Turning from your back to your side while in a flat bed without using bedrails: A little  Moving from lying on your back to sitting on the side of a flat bed without using bedrails: A little  Moving to and from bed to chair (including a wheelchair): A little  Standing up from a chair using your arms (e.g. wheelchair or bedside chair): A little  To walk in hospital room: A lot  Climbing 3-5 steps with railing: Total  Basic Mobility - Total Score: 15    Education Documentation  Precautions, taught by Tatiana Calles PTA at 5/7/2025 11:00 AM.  Learner: Patient  Readiness: Acceptance  Method: Explanation  Response: Verbalizes Understanding  Comment: posture when standing, cues for transfers    Body Mechanics, taught by Tatiana Calles PTA at 5/7/2025 11:00 AM.  Learner: Patient  Readiness: Acceptance  Method: Explanation  Response: Verbalizes Understanding  Comment: posture when standing, cues for transfers    Mobility Training, taught by Tatiana Calles PTA at 5/7/2025 11:00 AM.  Learner: Patient  Readiness: Acceptance  Method: Explanation  Response: Verbalizes Understanding  Comment: posture when standing, cues for transfers    Education Comments  No comments found.      Encounter Problems       Encounter Problems (Active)       PT Problem       pt will be able to ambulate 20 ft with FWW and modified independent  (Progressing)       Start:  05/05/25    Expected End:  05/26/25            Pt will be able to navigate 1 4 inch threshold with no hand rail and modified independent to allow for safe DC.   (Progressing)       Start:  05/05/25    Expected End:  05/26/25            Pt will be able to perform STS transfer with modified independent  (Progressing)       Start:  05/05/25    Expected End:   05/26/25            pt will be able to propel self home distances with manual WC and modified independent  (Progressing)       Start:  05/05/25    Expected End:  05/26/25            Pt will demonstrate ability to  object from ground from sitting position, without a device and mod I (Progressing)       Start:  05/05/25    Expected End:  05/26/25            Pt will be able to navigate in narrow as needed w FWW to allow for entrance into home bathroom. (Progressing)       Start:  05/05/25    Expected End:  05/26/25               Pain - Adult

## 2025-05-07 NOTE — PROGRESS NOTES
Physical Therapy    Physical Therapy Treatment    Patient Name: Karina Lee  MRN: [id number]  Department: [facility]  Room: [address] [place] [address]  Today's Date: 5/7/2025  Time Calculation  Start Time: 1240  Stop Time: 1311  Time Calculation (min): 31 min         Assessment/Plan   PT Assessment  PT Assessment Results: Decreased strength, Decreased endurance, Decreased range of motion, Impaired balance, Decreased mobility, Pain  Rehab Prognosis: Fair  Barriers to Discharge Home: Physical needs, Caregiver assistance  Caregiver Assistance: Patient lives alone and/or does not have reliable caregiver assistance  Physical Needs: Ambulating household distances limited by function/safety, Intermittent mobility assistance needed, High falls risk due to function or environment  Evaluation/Treatment Tolerance: Patient limited by pain  End of Session Communication: Bedside nurse, PCT/NA/CTA  End of Session Patient Position: Up in chair, Alarm on  PT Plan  Inpatient/Swing Bed or Outpatient: Swing Bed  PT Plan  Treatment/Interventions: Transfer training, Gait training  PT Plan: Ongoing PT  PT Frequency: BID  PT Discharge Recommendations: Low intensity level of continued care  Equipment Recommended upon Discharge: Wheeled walker  PT Recommended Transfer Status: Assist x1  PT - OK to Discharge: Yes      General Visit Information:   PT  Visit  PT Received On: 05/07/25  General  Reason for Referral: Impaired mobility  Referred By: WILLA Fitzpatrick-CNP  Past Medical History Relevant to Rehab: Acute URI, osteoporosis, anxiety, OA, CVA, GERD, HTN  Prior to Session Communication: PCT/NA/CTA, Bedside nurse  Patient Position Received: Up in chair, Alarm on  Preferred Learning Style: visual, verbal  General Comment: Patient agreeable to therapy this date    Subjective   Precautions:  Precautions  Hearing/Visual Limitations: Wear hearing aid/glasses  LE Weight Bearing Status: Weight Bearing as Tolerated  Medical Precautions: Fall  precautions  Precautions Comment: R hip fx non operable, angel catheter      Objective   Pain:  Pain Assessment  Pain Assessment: 0-10  0-10 (Numeric) Pain Score: 9  Pain Type: Acute pain  Pain Location: Hip  Pain Orientation: Right  Pain Interventions: Medication (See MAR), Distraction, Rest  Cognition:  Cognition  Orientation Level: Oriented X4  Coordination:  Movements are Fluid and Coordinated: Yes    Activity Tolerance:  Activity Tolerance  Endurance: Tolerates 30 min exercise with multiple rests  Treatments:     Ambulation/Gait Training  Ambulation/Gait Training Performed: Yes  Ambulation/Gait Training 1  Surface 1: Level tile  Device 1: Rolling walker  Gait Support Devices: Gait belt  Assistance 1: Contact guard  Quality of Gait 1: Antalgic, Inconsistent stride length, Decreased step length  Comments/Distance (ft) 1: 3x8 ft  Transfers  Transfer: Yes  Transfer 1  Transfer From 1: Sit to  Transfer to 1: Stand  Technique 1: Sit to stand, Stand to sit  Transfer Device 1: Walker, Gait belt  Transfer Level of Assistance 1: Contact guard, Minimal verbal cues  Trials/Comments 1: x5 with instructions for controlled decent    Outcome Measures:      05/07/25 1240   [facility] Basic Mobility   Turning from your back to your side while in a flat bed without using bedrails 3   Moving from lying on your back to sitting on the side of a flat bed without using bedrails 3   Moving to and from bed to chair (including a wheelchair) 3   Standing up from a chair using your arms (e.g. wheelchair or bedside chair) 3   To walk in hospital room 2   Climbing 3-5 steps with railing 1   Basic Mobility - Total Score 15         Encounter Problems       Encounter Problems (Active)       PT Problem       pt will be able to ambulate 20 ft with FWW and modified independent  (Progressing)       Start:  05/05/25    Expected End:  05/26/25            Pt will be able to navigate 1 4 inch threshold with no hand rail and modified independent to allow for  safe DC.   (Progressing)       Start:  05/05/25    Expected End:  05/26/25            Pt will be able to perform STS transfer with modified independent  (Progressing)       Start:  05/05/25    Expected End:  05/26/25            pt will be able to propel self home distances with manual WC and modified independent  (Progressing)       Start:  05/05/25    Expected End:  05/26/25            Pt will demonstrate ability to  object from ground from sitting position, without a device and mod I (Progressing)       Start:  05/05/25    Expected End:  05/26/25            Pt will be able to navigate in narrow as needed w FWW to allow for entrance into home bathroom. (Progressing)       Start:  05/05/25    Expected End:  05/26/25               Pain - Adult

## 2025-05-07 NOTE — PROGRESS NOTES
Occupational Therapy    Occupational Therapy Treatment    Name: Karina Lee  MRN: [id number]  Department: [facility]  Room: [address] [place] [address]  Date: 05/07/25  Time Calculation  Start Time: 1507  Stop Time: 1551  Time Calculation (min): 44 min    Assessment:  OT Assessment: RN cleared pt. Pt making good progress towards current goals with pain continuing to be a limiting factor at times but not during today's session. Pt demonstrated improved functional mobility and tolerance as able to complete transfer from recliner to sink (5-8 ft total) WW with close SUP throughout. No rest period or LOB observed. Grooming and UB bathing completed in support sit at sink with set-up/dist SUP. Plan at next session to focus on LB bathing with LHS and shower transfer for improved safety. At end, pt positioned in bed with alarm on. All needs met and RN notified.  Prognosis: Good  Barriers to Discharge Home: Caregiver assistance, Physical needs  Caregiver Assistance: Patient lives alone and/or does not have reliable caregiver assistance  Physical Needs: Ambulating household distances limited by function/safety, 24hr mobility assistance needed, Intermittent ADL assistance needed, High falls risk due to function or environment  Evaluation/Treatment Tolerance: Patient tolerated treatment well, Patient limited by pain  Medical Staff Made Aware: Yes  End of Session Communication: Bedside nurse, PCT/NA/CTA  End of Session Patient Position: Alarm on, Bed, 2 rail up    Plan:  OT - OK to Discharge: Yes    Subjective   Previous Visit Info:  OT Last Visit  OT Received On: 05/07/25    General:  General  Reason for Referral: Impaired ADLs  Referred By: Lee Ann Garnett, APRN-CNP  Past Medical History Relevant to Rehab: Acute URI, osteoporosis, anxiety, OA, CVA, GERD, HTN  OT Missed Visit: Yes  Missed Visit Reason: Other (Comment)  Family/Caregiver Present: No  Prior to Session Communication: Bedside nurse, PCT/NA/CTA  Patient Position Received: Up in chair,  Alarm on  Preferred Learning Style: visual, verbal  General Comment: Pt seated in recliner, agreeable to work with therapy. Requesting to get back into bed after done. Very cooperative and pleasant throughout.    Precautions:  Hearing/Visual Limitations: Wear hearing aid/glasses  LE Weight Bearing Status: Weight Bearing as Tolerated  Medical Precautions: Fall precautions  Precautions Comment: R hip fx non operable, angel catheter    Pain Assessment:  Pain Assessment  Pain Assessment: 0-10  0-10 (Numeric) Pain Score: 5 - Moderate pain  Pain Type: Acute pain  Pain Location: Hip  Pain Orientation: Right  Pain Descriptors: Aching  Pain Frequency: Intermittent  Pain Onset: Gradual  Pain Interventions: Cold applied, Repositioned  Response to Interventions: Content/relaxed    Objective   Cognition:  Overall Cognitive Status: Within Functional Limits  Orientation Level: Oriented X4    Activities of Daily Living:   Grooming  Grooming Level of Assistance: Setup, Distant supervision  Grooming Where Assessed: Recliner, Sitting sinkside  Grooming Comments: oral/denture care, hair care, wash face, apply deodorant. Chair up to sink with pt's ability to access drawer for supplies and faucet for water access. Therapist in/out of room during this time frame grabbing items for pt-- no concerns.    UE Bathing  UE Bathing Level of Assistance: Setup, Distant supervision  UE Bathing Where Assessed: Sitting sinkside  UE Bathing Comments: Complete UB wash completed by pt from seated position at sink. Pt also using washcloth to wash head/hair.    UE Dressing  UE Dressing Level of Assistance: Setup, Distant supervision  UE Dressing Where Assessed: Recliner, Other (Comment) (sitting sinkside)  UE Dressing Comments: [facility]-- pt completed fastener independently this date.    LE Dressing  LE Dressing: Yes  LE Dressing Adaptive Equipment: Reacher, Sock aide  Sock Level of Assistance: Contact guard (SBA)  LE Dressing Where  Assessed: Recliner  LE Dressing Comments: Doff socks via reacher and maya via sock aid. SBA to provide increased resistance (heel) or clothing stuck on equipment (sock on sockaid)    Bed Mobility/Transfers:   Bed Mobility  Bed Mobility: Yes  Bed Mobility 1  Bed Mobility 1: Sitting to supine  Level of Assistance 1: Minimum assistance  Bed Mobility Comments 1: Min A to clear LLE, leg  manipulated by pt for RLE.  Bed Mobility 2  Bed Mobility  2: Scooting  Level of Assistance 2: Close supervision  Bed Mobility Comments 2: 1 trial, towards HOB    Transfers  Transfer: Yes  Transfer 1  Transfer From 1: Sit to  Transfer to 1: Stand  Technique 1: Sit to stand, Stand to sit  Transfer Device 1: Gait belt, Walker  Transfer Level of Assistance 1: Contact guard, Minimum assistance  Trials/Comments 1: multiple trials, recliner surface--    Functional Mobility:  Functional Mobility  Functional Mobility Performed: Yes  Functional Mobility 1  Surface 1: Level tile  Device 1: Rolling walker  Functional Mobility Support Devices: Gait belt  Assistance 1: Close supervision  Comments 1: From recliner to sink, followed with recliner. About 5-8 feet total. Increased time however improved stride and ability to lift RLE to step forward (compared to at eval).    Sitting Balance:  Static Sitting Balance  Static Sitting-Level of Assistance: Modified Independent  Dynamic Sitting Balance  Dynamic Sitting-Level of Assistance: Close supervision    Standing Balance:  Static Standing Balance  Static Standing-Level of Assistance: Contact guard, Close supervision  Dynamic Standing Balance  Dynamic Standing-Level of Assistance: Contact guard, Minimum assistance    Outcome Measures:  [facility] Daily Activity  Putting on and taking off regular lower body clothing: A little  Bathing (including washing, rinsing, drying): A little  Putting on and taking off regular upper body clothing: A little  Toileting, which includes using toilet, bedpan or urinal: A  lot  Taking care of personal grooming such as brushing teeth: A little  Eating Meals: None  Daily Activity - Total Score: 18    Education Documentation  No documentation found.  Education Comments  No comments found.      Goals:  Encounter Problems       Encounter Problems (Active)       ADLs       Patient will perform UB and LB bathing with modified independent level of assistance and grab bars, shower chair, and long-handled sponge. (Progressing)       Start:  05/05/25    Expected End:  05/19/25            Patient with complete lower body dressing with modified independent level of assistance donning and doffing all LE clothes  with PRN adaptive equipment while supported sitting (Progressing)       Start:  05/05/25    Expected End:  05/19/25            Patient will complete daily grooming tasks with modified independent level of assistance and PRN adaptive equipment while supported sitting and standing. (Progressing)       Start:  05/05/25    Expected End:  05/19/25            Patient will complete toileting including hygiene clothing management/hygiene with modified independent level of assistance and raised toilet seat and grab bars.       Start:  05/05/25    Expected End:  05/19/25               BALANCE       Pt will maintain dynamic standing balance during ADL task with modified independent level of assistance in order to demonstrate decreased risk of falling and improved postural control. (Progressing)       Start:  05/05/25    Expected End:  05/19/25               MOBILITY          TRANSFERS       Patient will complete sit to stand transfer with modified independent level of assistance and least restrictive device in order to improve safety and prepare for out of bed mobility. (Progressing)       Start:  05/05/25    Expected End:  05/19/25

## 2025-05-07 NOTE — PROGRESS NOTES
05/07/25 1230   Discharge Planning   Living Arrangements Children   Support Systems Children   Assistance Needed Pt family cares for her at home. Pt will Dc to home when done at Swing unit with HH in place.   Type of Residence Private residence   Who is requesting discharge planning? Patient   Home or Post Acute Services In home services   Type of Home Care Services Home OT;Home PT   Expected Discharge Disposition Home H   Patient Choice   Provider Choice list and CMS website (https://medicare.gov/care-compare#search) for post-acute Quality and Resource Measure Data were provided and reviewed with: Patient   Patient / Family choosing to utilize agency / facility established prior to hospitalization No   Stroke Family Assessment   Stroke Family Assessment Needed No   Intensity of Service   Intensity of Service 0-30 min     Swing Bed Social Assessment: 5/7/25    Pt arrived from: [facility]    Inpatient [id number] dates: 5/4    Diagnosis: Closed hip fracture.     Skilled servcies needed: Pt and Ot     Follow-up appointments needed:    Patient lives with:    Prior level of function:    DME available:  Support people/agencies:  Weekly Swing Bed Meeting will be on:       at 11:30  Patient requests that following person/people to be given updates and/or attend meeting:    Patient is expected to reach home safety goals by:  Expected needs at discharge:

## 2025-05-07 NOTE — PROGRESS NOTES
05/07/25 1230   Discharge Planning   Living Arrangements Children   Support Systems Children   Assistance Needed Pt family cares for her at home. Pt will Dc to home when done at Swing unit with HH in place.   Type of Residence Private residence   Who is requesting discharge planning? Patient   Home or Post Acute Services In home services   Type of Home Care Services Home OT;Home PT   Expected Discharge Disposition Home H   Patient Choice   Provider Choice list and CMS website (https://medicare.gov/care-compare#search) for post-acute Quality and Resource Measure Data were provided and reviewed with: Patient   Patient / Family choosing to utilize agency / facility established prior to hospitalization No   Stroke Family Assessment   Stroke Family Assessment Needed No   Intensity of Service   Intensity of Service 0-30 min     Swing Bed Social Assessment: 5/7/25    Pt arrived from: [facility]    Inpatient [id number] dates: 5/4    Diagnosis: Closed right hip fracture    Skilled servcies needed:     Follow-up appointments needed:    Patient lives with: Help from daughters    Prior level of function: Independent prior.     DME available:  Support people/agencies:  Weekly Swing Bed Meeting will be on:  Thurs     at 11:30  Patient requests that following person/people to be given updates and/or attend meeting:    Patient is expected to reach home safety goals by:  Expected needs at discharge:  Home health in pace at DC.  JENNIFER OLIVAS

## 2025-05-08 PROBLEM — R33.8 ACUTE RETENTION OF URINE: Status: ACTIVE | Noted: 2025-05-08

## 2025-05-08 PROBLEM — J01.00 ACUTE NON-RECURRENT MAXILLARY SINUSITIS: Status: ACTIVE | Noted: 2025-05-08

## 2025-05-08 PROBLEM — D64.9 NORMOCYTIC ANEMIA: Status: ACTIVE | Noted: 2025-05-08

## 2025-05-08 PROCEDURE — 1900000001 HC SKILLED SWING ROOM

## 2025-05-08 PROCEDURE — 97116 GAIT TRAINING THERAPY: CPT | Mod: GP,CQ

## 2025-05-08 PROCEDURE — 2500000001 HC RX 250 WO HCPCS SELF ADMINISTERED DRUGS (ALT 637 FOR MEDICARE OP): Performed by: NURSE PRACTITIONER

## 2025-05-08 PROCEDURE — 2500000002 HC RX 250 W HCPCS SELF ADMINISTERED DRUGS (ALT 637 FOR MEDICARE OP, ALT 636 FOR OP/ED): Performed by: NURSE PRACTITIONER

## 2025-05-08 PROCEDURE — 94760 N-INVAS EAR/PLS OXIMETRY 1: CPT

## 2025-05-08 PROCEDURE — 97530 THERAPEUTIC ACTIVITIES: CPT | Mod: GP,CQ

## 2025-05-08 PROCEDURE — 2500000004 HC RX 250 GENERAL PHARMACY W/ HCPCS (ALT 636 FOR OP/ED): Mod: JZ | Performed by: NURSE PRACTITIONER

## 2025-05-08 PROCEDURE — 2500000005 HC RX 250 GENERAL PHARMACY W/O HCPCS: Performed by: NURSE PRACTITIONER

## 2025-05-08 PROCEDURE — 97535 SELF CARE MNGMENT TRAINING: CPT | Mod: GO

## 2025-05-08 PROCEDURE — 97110 THERAPEUTIC EXERCISES: CPT | Mod: GP,CQ

## 2025-05-08 RX ORDER — NYSTATIN 100000 [USP'U]/ML
5 SUSPENSION ORAL 3 TIMES DAILY
Status: COMPLETED | OUTPATIENT
Start: 2025-05-08 | End: 2025-05-15

## 2025-05-08 RX ADMIN — OXYCODONE 10 MG: 5 TABLET ORAL at 20:52

## 2025-05-08 RX ADMIN — MELOXICAM 15 MG: 7.5 TABLET ORAL at 08:10

## 2025-05-08 RX ADMIN — SENNOSIDES 17.2 MG: 8.6 TABLET, FILM COATED ORAL at 08:11

## 2025-05-08 RX ADMIN — BUPROPION HYDROCHLORIDE 150 MG: 150 TABLET, EXTENDED RELEASE ORAL at 20:53

## 2025-05-08 RX ADMIN — PROPRANOLOL HYDROCHLORIDE 120 MG: 60 CAPSULE, EXTENDED RELEASE ORAL at 08:15

## 2025-05-08 RX ADMIN — LISINOPRIL 20 MG: 20 TABLET ORAL at 08:11

## 2025-05-08 RX ADMIN — MAGNESIUM HYDROXIDE 10 ML: 2400 SUSPENSION ORAL at 08:10

## 2025-05-08 RX ADMIN — ATORVASTATIN CALCIUM 20 MG: 10 TABLET, FILM COATED ORAL at 20:53

## 2025-05-08 RX ADMIN — MELATONIN TAB 3 MG 3 MG: 3 TAB at 20:52

## 2025-05-08 RX ADMIN — OXYCODONE 10 MG: 5 TABLET ORAL at 11:08

## 2025-05-08 RX ADMIN — BENZOCAINE AND MENTHOL 1 LOZENGE: 15; 3.6 LOZENGE ORAL at 08:09

## 2025-05-08 RX ADMIN — ASPIRIN 81 MG: 81 TABLET, CHEWABLE ORAL at 20:53

## 2025-05-08 RX ADMIN — NYSTATIN 500000 UNITS: 100000 SUSPENSION ORAL at 20:53

## 2025-05-08 RX ADMIN — ENOXAPARIN SODIUM 40 MG: 40 INJECTION SUBCUTANEOUS at 08:11

## 2025-05-08 RX ADMIN — ESCITALOPRAM OXALATE 20 MG: 10 TABLET, FILM COATED ORAL at 20:53

## 2025-05-08 RX ADMIN — NYSTATIN 500000 UNITS: 100000 SUSPENSION ORAL at 14:44

## 2025-05-08 RX ADMIN — FUROSEMIDE 20 MG: 20 TABLET ORAL at 08:11

## 2025-05-08 ASSESSMENT — COGNITIVE AND FUNCTIONAL STATUS - GENERAL
MOVING TO AND FROM BED TO CHAIR: A LITTLE
WALKING IN HOSPITAL ROOM: A LOT
HELP NEEDED FOR BATHING: A LITTLE
DAILY ACTIVITIY SCORE: 18
PERSONAL GROOMING: A LITTLE
TOILETING: A LOT
MOBILITY SCORE: 13
TURNING FROM BACK TO SIDE WHILE IN FLAT BAD: A LOT
WALKING IN HOSPITAL ROOM: A LITTLE
TURNING FROM BACK TO SIDE WHILE IN FLAT BAD: A LOT
STANDING UP FROM CHAIR USING ARMS: A LITTLE
CLIMB 3 TO 5 STEPS WITH RAILING: TOTAL
DAILY ACTIVITIY SCORE: 15
MOVING FROM LYING ON BACK TO SITTING ON SIDE OF FLAT BED WITH BEDRAILS: A LITTLE
WALKING IN HOSPITAL ROOM: A LOT
MOVING TO AND FROM BED TO CHAIR: A LITTLE
DAILY ACTIVITIY SCORE: 15
CLIMB 3 TO 5 STEPS WITH RAILING: TOTAL
DRESSING REGULAR UPPER BODY CLOTHING: A LOT
TURNING FROM BACK TO SIDE WHILE IN FLAT BAD: A LITTLE
STANDING UP FROM CHAIR USING ARMS: A LITTLE
MOVING FROM LYING ON BACK TO SITTING ON SIDE OF FLAT BED WITH BEDRAILS: A LITTLE
MOVING TO AND FROM BED TO CHAIR: A LITTLE
TOILETING: A LOT
TURNING FROM BACK TO SIDE WHILE IN FLAT BAD: A LITTLE
DRESSING REGULAR LOWER BODY CLOTHING: A LOT
TOILETING: A LOT
MOBILITY SCORE: 16
STANDING UP FROM CHAIR USING ARMS: A LITTLE
STANDING UP FROM CHAIR USING ARMS: A LITTLE
DRESSING REGULAR UPPER BODY CLOTHING: A LITTLE
DRESSING REGULAR LOWER BODY CLOTHING: A LITTLE
CLIMB 3 TO 5 STEPS WITH RAILING: TOTAL
DRESSING REGULAR UPPER BODY CLOTHING: A LOT
CLIMB 3 TO 5 STEPS WITH RAILING: TOTAL
MOVING FROM LYING ON BACK TO SITTING ON SIDE OF FLAT BED WITH BEDRAILS: A LOT
HELP NEEDED FOR BATHING: A LOT
MOVING FROM LYING ON BACK TO SITTING ON SIDE OF FLAT BED WITH BEDRAILS: A LOT
WALKING IN HOSPITAL ROOM: A LITTLE
MOVING TO AND FROM BED TO CHAIR: A LITTLE
PERSONAL GROOMING: A LITTLE
MOBILITY SCORE: 16
HELP NEEDED FOR BATHING: A LOT
MOBILITY SCORE: 13
PERSONAL GROOMING: A LITTLE
DRESSING REGULAR LOWER BODY CLOTHING: A LOT

## 2025-05-08 ASSESSMENT — PAIN - FUNCTIONAL ASSESSMENT
PAIN_FUNCTIONAL_ASSESSMENT: 0-10

## 2025-05-08 ASSESSMENT — PAIN DESCRIPTION - DESCRIPTORS
DESCRIPTORS: ACHING;THROBBING
DESCRIPTORS: ACHING

## 2025-05-08 ASSESSMENT — ACTIVITIES OF DAILY LIVING (ADL)
BATHING_WHERE_ASSESSED: OTHER (COMMENT)
HOME_MANAGEMENT_TIME_ENTRY: 63
BATHING_LEVEL_OF_ASSISTANCE: SETUP;DISTANT SUPERVISION

## 2025-05-08 ASSESSMENT — PAIN SCALES - GENERAL
PAINLEVEL_OUTOF10: 9
PAINLEVEL_OUTOF10: 7
PAINLEVEL_OUTOF10: 8
PAINLEVEL_OUTOF10: 7

## 2025-05-08 NOTE — CARE PLAN
"The clinical goals for the shift include Pt to participate in therapy, use call bell and wait for assistance    Pt participated in therapy. C/o sore throat and given cepacol earlier with good effect. Pt told provider of continued sore throat and nystatin swish and swallow ordered. Dose given with no adverse reaction noted. Pt medicated times 1 for pain with good effect. Pt medication compliant, in pudding with thin liquids. Donovan cath removed per protocol. No urine output as of yet, continuing to monitor. Pt is poor fluid intake since admission, \"Im just not thirsty\". Encouraging fluids, educated on need for fluids. Safety devices in place, call bell within reach.   "

## 2025-05-08 NOTE — PROGRESS NOTES
Karina Lee is a 84 y.o. female on day 4 of admission presenting with Closed right hip fracture, sequela.      Subjective   Patient assessed while sitting in a wheel chair. She complained of a sore throat and difficulty swallowing. She denies cough, chest pain, fever, chills, N/V/D/C.       Objective     Last Recorded Vitals  /73 (BP Location: Right arm, Patient Position: Lying)   Pulse 53   Temp 36.3 °C (97.3 °F) (Temporal)   Resp 18   Wt 89.4 kg (197 lb)   SpO2 93%   Intake/Output last 3 Shifts:    Intake/Output Summary (Last 24 hours) at 5/8/2025 1143  Last data filed at 5/8/2025 0826  Gross per 24 hour   Intake 480 ml   Output 300 ml   Net 180 ml       Admission Weight  Weight: 89.4 kg (197 lb) (05/04/25 1306)    Daily Weight  05/04/25 : 89.4 kg (197 lb)    Image Results      Physical Exam  Vitals reviewed.   Constitutional:       Appearance: Normal appearance. She is obese.   HENT:      Head: Normocephalic and atraumatic.      Right Ear: External ear normal.      Left Ear: External ear normal.      Nose: Nose normal.      Mouth/Throat:      Mouth: Mucous membranes are moist.      Comments: White patches on tongue and throat  Eyes:      Conjunctiva/sclera: Conjunctivae normal.      Pupils: Pupils are equal, round, and reactive to light.   Cardiovascular:      Rate and Rhythm: Normal rate and regular rhythm.      Pulses: Normal pulses.      Heart sounds: Normal heart sounds.   Pulmonary:      Effort: Pulmonary effort is normal.      Breath sounds: Normal breath sounds.   Abdominal:      General: Bowel sounds are normal.      Palpations: Abdomen is soft.   Genitourinary:     Comments: Donovan catheter draining yellow urine  Musculoskeletal:         General: Swelling and tenderness present.      Cervical back: Normal range of motion and neck supple.      Comments: Limited ROM to RLE   Skin:     General: Skin is warm and dry.   Neurological:      General: No focal deficit present.      Mental Status: She  is alert and oriented to person, place, and time.   Psychiatric:         Mood and Affect: Mood normal.         Behavior: Behavior normal.         Relevant Results    Scheduled medications  Scheduled Medications[1]  Continuous medications  Continuous Medications[2]  PRN medications  PRN Medications[3]    No results found. However, due to the size of the patient record, not all encounters were searched. Please check Results Review for a complete set of results.                  This patient has a urinary catheter   Reason for the urinary catheter remaining today? Urine catheter unnecessary, will be removed today    Assessment & Plan  Closed right hip fracture, sequela    ASHD (arteriosclerotic heart disease)    Constipation    Stage 3a chronic kidney disease (Multi)    Anxiety and depression    Benign essential HTN    Dyslipidemia    Thrombocytopenia (CMS-HCC)    Acute retention of urine    Normocytic anemia    Acute non-recurrent maxillary sinusitis      Generalized Weakness  Right Greater trochanter of femur fracture  Fall at home  - PT/OT evaluation; recommended moderate intensity  - WBAT  - Follow up with orthopedic outpatient, non surgical  - discontinued acetaminophen 975 mg daily  - stopped toradol  - continue oxycodone 5 - 10 mg q6h, prn  - discontinued dilaudid 0.5 mg prn breakthrough     Acute paranasal sinusitis, resolved  - completed augmentin 625 mg BID  - discontinued flonase 2 spray daily     ASHD  Dyslipidemia  Essential HTN  - continue furosemide 20 mg daily, aspirin 81 mg daily, atorvastatin 20 mg daily, lisinopril 20 mg daily  - monitor BP     Chronic kidney failure, Stage 3a  Urine retention  - baseline creatine 1.0  - creatine on admission 1.18; creatine today 1.03  - monitor renal function  - renally dose medication  - discontinued 0.9% NS at 75 mL/hr x 1 day  - bladder scanned for > 400 mL  - removed angel catheter  - KUB: showed moderate constipation    Constipation  - continue MOM 2,400 mg  daily, prn    Oral candidiasis  - started nystatin 100,000 units/mL (5 mL) TID       Hyponatremia  - Na 132 > 128 > today 135  - started 0.9% NS at 75 mL/hr     Thrombocytopenia  Normocytic anemia  -  > 126 > today 130  - Hb 11.6;   - monitor CBC     Anxiety and depression  - continue bupropion 150 mg daily, escitalopram 20 mg daily, propranolol 120 mg daily     DVT ppx  - discontinued enoxaparin 30 mg daily  - discontinued fondaparinux 2.5 mg daily     Code status: Full     Disposition: Patient requires moderate intensity therapy    Seen and discussed with MD Lee Ann Mccray, APRN-CNP    Attending Attestation:    Patient was seen and examined face to face, history and physical was taken personally at bedside the APRN-CNP, was present for the whole duration of the exam who participated in the documentation of this note. I performed the medical decision-making components (assessment and plan of care). I have reviewed the documentation and verified the findings in the note as written with additions or exceptions as stated in the body of this note.  Complaining of sore throat with swallowing, doing fairly well with therapy, pain is well-controlled, on exam heart regular, lungs clear, incision is healing on right hip, slight edema right lower extremity, pedal pulses are present.  There is probably some whitish stuff in back of throat.  Some tenderness over sinuses and congestion in nasal passage.  Give patient nystatin swish and swallow for possible oral candidiasis, antihistamine, Flonase, Mucinex for sinusitis, continue with physical and Occupational Therapy, pain medication,    Dr. Elias Patino MD  Internal Medicine        [1] aspirin, 81 mg, oral, Daily  atorvastatin, 20 mg, oral, Daily  buPROPion XL, 150 mg, oral, Daily  enoxaparin, 40 mg, subcutaneous, Daily  escitalopram, 20 mg, oral, Daily  furosemide, 20 mg, oral, Daily  lisinopril, 20 mg, oral, Daily  melatonin, 3  mg, oral, Nightly  meloxicam, 15 mg, oral, Daily  nystatin, 5 mL, Swish & Swallow, TID  propranolol LA, 120 mg, oral, Daily  sennosides, 2 tablet, oral, Daily  [2]    [3] PRN medications: benzocaine-menthol, magnesium hydroxide, ondansetron ODT, oxyCODONE, oxyCODONE

## 2025-05-08 NOTE — PROGRESS NOTES
25 1255   Discharge Planning   Living Arrangements Family members   Support Systems Family members;Children   Assistance Needed Pt Swing bed meeting was today and Pt will remain for atleast another week.   Type of Residence Private residence   Who is requesting discharge planning? Patient   Home or Post Acute Services In home services   Type of Home Care Services Home OT;Home PT   Expected Discharge Disposition Home H   Patient Choice   Provider Choice list and CMS website (https://medicare.gov/care-compare#search) for post-acute Quality and Resource Measure Data were provided and reviewed with: Patient   Patient / Family choosing to utilize agency / facility established prior to hospitalization No   Stroke Family Assessment   Stroke Family Assessment Needed No   Intensity of Service   Intensity of Service 0-30 min     Weekly Swing Bed Meetin25    Present were: Karina Lee    Accomplishments: Pt and Ot states pt is still working on her goals.         Barriers: Pt still needs to work on walking more to brush her teeth, getting up more during her day. Pt has pain and this needs to decrease to make gains in therapy.         Planned rehab focus for the following week: Work on her goals for upcoming week. Pick her home health choice.   JENNIFER OLIVAS

## 2025-05-08 NOTE — PROGRESS NOTES
Physical Therapy    Physical Therapy Treatment    Patient Name: Karina Lee  MRN: [id number]  Department: [facility]  Room: [address] [place] [address]  Today's Date: 5/8/2025  Time Calculation  Start Time: 1336  Stop Time: 1405  Time Calculation (min): 29 min    Assessment/Plan   PT Assessment  End of Session Communication: Bedside nurse, PCT/LEONARD/PRATIMA  End of Session Patient Position: Bed, 2 rail up, Alarm on  PT Plan  Inpatient/Swing Bed or Outpatient: Swing Bed  PT Plan  Treatment/Interventions: Transfer training, Gait training  PT Plan: Ongoing PT  PT Frequency: BID  PT Discharge Recommendations: Low intensity level of continued care  Equipment Recommended upon Discharge: Wheeled walker  PT Recommended Transfer Status: Assist x1  PT - OK to Discharge: Yes      General Visit Information:   PT  Visit  PT Received On: 05/08/25  Response to Previous Treatment: Patient with no complaints from previous session.  General  Reason for Referral: Decresed mobility  Referred By: WILLA Fitzpatrick-CNP  Past Medical History Relevant to Rehab: Acute URI, osteoporosis, anxiety, OA, CVA, GERD, HTN  Prior to Session Communication: Bedside nurse, PCT/LEONARD/PRATIMA  Patient Position Received: Bed, 2 rail up, Alarm on  Preferred Learning Style: visual, verbal    Subjective   Precautions:  Precautions  Hearing/Visual Limitations: Wear hearing aid/glasses  LE Weight Bearing Status: Weight Bearing as Tolerated  Medical Precautions: Fall precautions  Precautions Comment: R hip fx non operable, angel catheter    Objective   Pain:  Pain Assessment  Pain Assessment: 0-10  0-10 (Numeric) Pain Score: 7  Pain Type: Acute pain  Pain Location: Hip  Pain Orientation: Right  Pain Descriptors: Aching  Pain Interventions: Medication (See MAR), Distraction, Rest  Cognition:  Cognition  Overall Cognitive Status: Within Functional Limits  Orientation Level: Oriented X4  Coordination:  Movements are Fluid and Coordinated: Yes  Postural Control:  Static Sitting Balance  Static  Sitting-Level of Assistance: Close supervision  Dynamic Sitting Balance  Dynamic Sitting-Level of Assistance: Close supervision  Static Standing Balance  Static Standing-Level of Assistance: Close supervision  Dynamic Standing Balance  Dynamic Standing-Level of Assistance: Contact guard    Activity Tolerance:  Activity Tolerance  Endurance: Tolerates 30 min exercise with multiple rests  Treatments:  Therapeutic Exercise  Therapeutic Exercise Performed: Yes  Therapeutic Exercise Activity 1: Hip Add/Abd w YTB x 25  Therapeutic Exercise Activity 2: LAQ's x25  Therapeutic Exercise Activity 3: HS curls w YTB    Ambulation/Gait Training  Ambulation/Gait Training Performed: Yes  Ambulation/Gait Training 1  Surface 1: Level tile  Device 1: Rolling walker  Gait Support Devices: Gait belt  Assistance 1: Contact guard  Quality of Gait 1: Antalgic, Inconsistent stride length, Decreased step length  Comments/Distance (ft) 1: out into hallway 15 ft turn around and back 15ft to bed  Transfers  Transfer: Yes  Transfer 1  Transfer From 1: Sit to  Transfer to 1: Stand  Technique 1: Sit to stand, Stand to sit  Transfer Device 1: Walker, Gait belt  Transfer Level of Assistance 1: Contact guard, Close supervision  Transfers 2  Transfer From 2: Stand to  Transfer to 2: Bed  Technique 2: Stand to sit  Transfer Device 2: Gait belt  Transfer Level of Assistance 2: Contact guard    Outcome Measures:  [facility] Basic Mobility  Turning from your back to your side while in a flat bed without using bedrails: A little  Moving from lying on your back to sitting on the side of a flat bed without using bedrails: A little  Moving to and from bed to chair (including a wheelchair): A little  Standing up from a chair using your arms (e.g. wheelchair or bedside chair): A little  To walk in hospital room: A little  Climbing 3-5 steps with railing: Total  Basic Mobility - Total Score: 16    Encounter Problems       Encounter Problems (Active)       PT Problem        pt will be able to ambulate 20 ft with FWW and modified independent  (Progressing)       Start:  05/05/25    Expected End:  05/26/25            Pt will be able to navigate 1 4 inch threshold with no hand rail and modified independent to allow for safe DC.   (Progressing)       Start:  05/05/25    Expected End:  05/26/25            Pt will be able to perform STS transfer with modified independent  (Progressing)       Start:  05/05/25    Expected End:  05/26/25            pt will be able to propel self home distances with manual WC and modified independent  (Progressing)       Start:  05/05/25    Expected End:  05/26/25            Pt will demonstrate ability to  object from ground from sitting position, without a device and mod I (Progressing)       Start:  05/05/25    Expected End:  05/26/25            Pt will be able to navigate in narrow as needed w FWW to allow for entrance into home bathroom. (Progressing)       Start:  05/05/25    Expected End:  05/26/25               Pain - Adult

## 2025-05-08 NOTE — PROGRESS NOTES
Occupational Therapy    Occupational Therapy Treatment    Name: Karina Lee  MRN: [id number]  [facility]  Room: [address] [place] [address]  Date: 05/08/25  Time Calculation  Start Time: 0958  Stop Time: 1101  Time Calculation (min): 63 min    Assessment:  OT Assessment: RN cleared pt. Pt continues to make good progress towards current goals- demonstrated increased ease with initial STS (close SUP) with WW to complete ambulation to sink (5-8ft total) with close SUP and a chair follow in case of fatigue however not needed, no LOB. At end of session, pt displaying significant fatigue as evident by requiring increased attempts to complete STS from seated surface and mod A with bed mobility to get into bed while also utilizing leg . A provided by therapist to lift BLE up onto bed, min effort through leg . Pt continues to be limited due to significant pain experienced at times however is motivated to participate within therapy. Does occassionally require cues to encourage pt to first trial on own then ask for help but have noticed improvements within that area.  Prognosis: Good  Barriers to Discharge Home: Caregiver assistance, Physical needs  Caregiver Assistance: Patient lives alone and/or does not have reliable caregiver assistance  Physical Needs: Ambulating household distances limited by function/safety, 24hr mobility assistance needed, Intermittent ADL assistance needed, High falls risk due to function or environment  Evaluation/Treatment Tolerance: Patient tolerated treatment well, Patient limited by pain, Patient limited by fatigue  Medical Staff Made Aware: Yes  End of Session Communication: Bedside nurse, PCT/NA/CTA  End of Session Patient Position: Bed, 2 rail up, Alarm on    Plan:  OT - OK to Discharge: Yes    Subjective   Previous Visit Info:  OT Last Visit  OT Received On: 05/08/25    General:  General  Reason for Referral: Assess ADLs  Referred By: Lee Ann Garnett, APRN-CNP  Past Medical History Relevant  to Rehab: Acute URI, osteoporosis, anxiety, OA, CVA, GERD, HTN  Family/Caregiver Present: No  Prior to Session Communication: Bedside nurse, PCT/NA/CTA  Patient Position Received: Up in chair, Alarm on  Preferred Learning Style: visual, verbal  General Comment: Pt seated in chair, agreeable to work with therapy. Cooperative throughout.    Precautions:  Hearing/Visual Limitations: Wear hearing aid/glasses  LE Weight Bearing Status: Weight Bearing as Tolerated  Medical Precautions: Fall precautions  Precautions Comment: R hip fx non operable, angel catheter    Pain Assessment:  Pain Assessment  Pain Assessment: 0-10  0-10 (Numeric) Pain Score: 7  Pain Type: Acute pain  Pain Location: Hip  Pain Orientation: Right  Pain Descriptors: Aching, Throbbing  Pain Frequency: Intermittent  Pain Onset: Gradual  Pain Interventions: Repositioned, Other (Comment) (Notified RN of pt's request for pain meds)  Response to Interventions: Content/relaxed    Objective   Cognition:  Overall Cognitive Status: Within Functional Limits  Orientation Level: Oriented X4    Activities of Daily Living:   Grooming  Grooming Level of Assistance: Setup, Distant supervision  Grooming Where Assessed: Recliner, Sitting sinkside  Grooming Comments: while seated in recliner- apply deodorant, brush hair, wash face. Seated at sink- oral/denture care.    LE Bathing  LE Bathing Level of Assistance: Setup, Distant supervision  LE Bathing Where Assessed: Other (Comment) (recliner)  LE Bathing Comments: All LB wash completed by pt, did not want to wash feet at this date. Pericare also completed by pt 50% (front) with therapist assisting from behind.    LE Dressing  LE Dressing: Yes  LE Dressing Adaptive Equipment: Reacher, Sock aide  Sock Level of Assistance: Minimal verbal cues, Contact guard, Modified independent (SBA)  Adult Briefs Level of Assistance: Contact guard, Minimal verbal cues  LE Dressing Where Assessed: Recliner  LE Dressing Comments: Doff socks  via reacher mod I/Grady socks via sock aid mod I/SBA as pt struggled minorly getting sock onto device 1/2. No struggle using straps to pull up/get sock onto foot.    Bed Mobility/Transfers:   Bed Mobility  Bed Mobility: Yes  Bed Mobility 1  Bed Mobility 1: Sitting to supine  Level of Assistance 1: Moderate assistance  Bed Mobility Comments 1: Assist provided for BLE elevation up onto bed, pt utilizing leg  with RLE to assist therapist. Pt states feeling increasingly weak in BUE with inability to pull up RLE on own.  Bed Mobility 2  Bed Mobility  2: Scooting  Level of Assistance 2: Close supervision  Bed Mobility Comments 2: 1 trial, towards HOB    Transfers  Transfer: Yes  Transfer 1  Transfer From 1: Sit to  Transfer to 1: Stand  Technique 1: Sit to stand, Stand to sit  Transfer Device 1: Walker, Gait belt  Transfer Level of Assistance 1: Contact guard, Close supervision  Trials/Comments 1: mult trials throughout, varied surfaces (recliner, w/c, shower chair)    Shower Transfers  Shower Transfer From: Walker  Shower Transfer Type: To and from  Shower Transfer to: Shower seat with back  Shower Transfer Technique: Ambulating  Shower Transfers: Contact guard  Shower Transfers Comments: 1 trial/ increased A required to get out of shower (therapist pushing pt on shower chair out of shower room to position close to w/c due to fatigue)    Functional Mobility:  Functional Mobility  Functional Mobility Performed: Yes  Functional Mobility 1  Surface 1: Level tile  Device 1: Rolling walker  Functional Mobility Support Devices: Gait belt  Assistance 1: Close supervision  Comments 1: Transfer from recliner to sink, 5-8 ft total with therapist following behind with recliner in case break required- not needed, no LOB.    Sitting Balance:  Static Sitting Balance  Static Sitting-Level of Assistance: Modified Independent  Dynamic Sitting Balance  Dynamic Sitting-Level of Assistance: Close supervision    Standing  Balance:  Static Standing Balance  Static Standing-Level of Assistance: Contact guard, Close supervision  Dynamic Standing Balance  Dynamic Standing-Level of Assistance: Contact guard, Minimum assistance    Outcome Measures:  [facility] Daily Activity  Putting on and taking off regular lower body clothing: A little  Bathing (including washing, rinsing, drying): A little  Putting on and taking off regular upper body clothing: A little  Toileting, which includes using toilet, bedpan or urinal: A lot  Taking care of personal grooming such as brushing teeth: A little  Eating Meals: None  Daily Activity - Total Score: 18    Education Documentation  No documentation found.  Education Comments  No comments found.      Goals:  Encounter Problems       Encounter Problems (Active)       ADLs       Patient will perform UB and LB bathing with modified independent level of assistance and grab bars, shower chair, and long-handled sponge. (Progressing)       Start:  05/05/25    Expected End:  05/19/25            Patient with complete lower body dressing with modified independent level of assistance donning and doffing all LE clothes  with PRN adaptive equipment while supported sitting (Progressing)       Start:  05/05/25    Expected End:  05/19/25            Patient will complete daily grooming tasks with modified independent level of assistance and PRN adaptive equipment while supported sitting and standing. (Progressing)       Start:  05/05/25    Expected End:  05/19/25            Patient will complete toileting including hygiene clothing management/hygiene with modified independent level of assistance and raised toilet seat and grab bars.       Start:  05/05/25    Expected End:  05/19/25               BALANCE       Pt will maintain dynamic standing balance during ADL task with modified independent level of assistance in order to demonstrate decreased risk of falling and improved postural control. (Progressing)       Start:  05/05/25     Expected End:  05/19/25               MOBILITY          TRANSFERS       Patient will complete sit to stand transfer with modified independent level of assistance and least restrictive device in order to improve safety and prepare for out of bed mobility. (Progressing)       Start:  05/05/25    Expected End:  05/19/25

## 2025-05-08 NOTE — PROGRESS NOTES
Physical Therapy    Physical Therapy Treatment    Patient Name: Karina Lee  MRN: [id number]  Department: [facility]  Room: [address] [place] [address]  Today's Date: 5/8/2025  Time Calculation  Start Time: 0830  Stop Time: 0918  Time Calculation (min): 48 min         Assessment/Plan   PT Assessment  PT Assessment Results: Decreased strength, Decreased endurance, Decreased range of motion, Impaired balance, Decreased mobility, Pain  Rehab Prognosis: Fair  Barriers to Discharge Home: Physical needs, Caregiver assistance  Caregiver Assistance: Patient lives alone and/or does not have reliable caregiver assistance  Physical Needs: Ambulating household distances limited by function/safety, Intermittent mobility assistance needed, High falls risk due to function or environment  Evaluation/Treatment Tolerance: Patient limited by pain  End of Session Communication: Bedside nurse, PCT/NA/CTA  End of Session Patient Position: Up in chair  PT Plan  Inpatient/Swing Bed or Outpatient: Swing Bed  PT Plan  Treatment/Interventions: Transfer training, Gait training  PT Plan: Ongoing PT  PT Frequency: BID  PT Discharge Recommendations: Low intensity level of continued care  Equipment Recommended upon Discharge: Wheeled walker  PT Recommended Transfer Status: Assist x1  PT - OK to Discharge: Yes      General Visit Information:   PT  Visit  PT Received On: 05/08/25  Response to Previous Treatment: Patient with no complaints from previous session.  General  Reason for Referral: Decreased Mobility  Referred By: WILLA Fitzpatrick-CNP  Past Medical History Relevant to Rehab: Acute URI, osteoporosis, anxiety, OA, CVA, GERD, HTN  Prior to Session Communication: Bedside nurse, PCT/NA/CTA  Patient Position Received: Bed, 2 rail up, Alarm on  Preferred Learning Style: visual, verbal  General Comment: Patient agreeable to therapy this date    Subjective   Precautions:  Precautions  Hearing/Visual Limitations: Wear hearing aid/glasses  LE Weight Bearing Status:  Weight Bearing as Tolerated  Medical Precautions: Fall precautions  Precautions Comment: R hip fx non operable, angel catheter     Date/Time Vitals Session Patient Position Pulse Resp SpO2 BP MAP (mmHg)    05/08/25 0735 --  --  53  18  93 %  143/73  --             Objective   Pain:  Pain Assessment  Pain Assessment: 0-10  0-10 (Numeric) Pain Score: 9  Pain Type: Acute pain  Pain Location: Hip  Pain Orientation: Right  Pain Interventions: Medication (See MAR), Distraction, Rest  Cognition:  Cognition  Orientation Level: Oriented X4  Coordination:  Movements are Fluid and Coordinated: Yes    Activity Tolerance:  Activity Tolerance  Endurance: Tolerates 30 min exercise with multiple rests  Treatments:  Therapeutic Exercise  Therapeutic Exercise Performed: Yes  Therapeutic Exercise Activity 1: Hip Add/Abd w YTB x 25  Therapeutic Exercise Activity 2: LAQ's x25  Therapeutic Exercise Activity 3: HS curls w YTB    Bed Mobility  Bed Mobility: Yes  Bed Mobility 1  Bed Mobility 1: Supine to sitting  Level of Assistance 1: Set up  Bed Mobility Comments 1: Leg     Ambulation/Gait Training  Ambulation/Gait Training Performed: Yes  Ambulation/Gait Training 1  Surface 1: Level tile  Device 1: Parallel bars  Gait Support Devices: Gait belt  Assistance 1: Contact guard  Quality of Gait 1: Antalgic, Inconsistent stride length, Decreased step length  Comments/Distance (ft) 1: 10 ft x 3  Transfers  Transfer: Yes  Transfer 1  Transfer From 1: Sit to  Transfer to 1: Stand  Technique 1: Sit to stand, Stand to sit  Transfer Device 1: Gait belt, Walker  Transfer Level of Assistance 1: Contact guard  Trials/Comments 1: x5 with instructions for controlled decent  Transfers 2  Transfer From 2: Chair with arms to  Transfer to 2: Mat  Technique 2: Stand pivot, To right  Transfer Device 2: Gait belt  Transfer Level of Assistance 2: Contact guard    Outcome Measures:  [facility] Basic Mobility  Turning from your back to your side while in a flat  bed without using bedrails: A little  Moving from lying on your back to sitting on the side of a flat bed without using bedrails: A little  Moving to and from bed to chair (including a wheelchair): A little  Standing up from a chair using your arms (e.g. wheelchair or bedside chair): A little  To walk in hospital room: A little  Climbing 3-5 steps with railing: Total  Basic Mobility - Total Score: 16       Encounter Problems       Encounter Problems (Active)       PT Problem       pt will be able to ambulate 20 ft with FWW and modified independent  (Progressing)       Start:  05/05/25    Expected End:  05/26/25            Pt will be able to navigate 1 4 inch threshold with no hand rail and modified independent to allow for safe DC.   (Progressing)       Start:  05/05/25    Expected End:  05/26/25            Pt will be able to perform STS transfer with modified independent  (Progressing)       Start:  05/05/25    Expected End:  05/26/25            pt will be able to propel self home distances with manual WC and modified independent  (Progressing)       Start:  05/05/25    Expected End:  05/26/25            Pt will demonstrate ability to  object from ground from sitting position, without a device and mod I (Progressing)       Start:  05/05/25    Expected End:  05/26/25            Pt will be able to navigate in narrow as needed w FWW to allow for entrance into home bathroom. (Progressing)       Start:  05/05/25    Expected End:  05/26/25               Pain - Adult

## 2025-05-09 PROCEDURE — 2500000001 HC RX 250 WO HCPCS SELF ADMINISTERED DRUGS (ALT 637 FOR MEDICARE OP): Performed by: NURSE PRACTITIONER

## 2025-05-09 PROCEDURE — 2500000004 HC RX 250 GENERAL PHARMACY W/ HCPCS (ALT 636 FOR OP/ED): Mod: JZ | Performed by: NURSE PRACTITIONER

## 2025-05-09 PROCEDURE — 1900000001 HC SKILLED SWING ROOM

## 2025-05-09 PROCEDURE — 97530 THERAPEUTIC ACTIVITIES: CPT | Mod: GP,CQ

## 2025-05-09 PROCEDURE — 94760 N-INVAS EAR/PLS OXIMETRY 1: CPT

## 2025-05-09 PROCEDURE — 99304 1ST NF CARE SF/LOW MDM 25: CPT | Performed by: NURSE PRACTITIONER

## 2025-05-09 PROCEDURE — 97116 GAIT TRAINING THERAPY: CPT | Mod: GP,CQ

## 2025-05-09 PROCEDURE — 2500000005 HC RX 250 GENERAL PHARMACY W/O HCPCS: Performed by: NURSE PRACTITIONER

## 2025-05-09 PROCEDURE — 2500000002 HC RX 250 W HCPCS SELF ADMINISTERED DRUGS (ALT 637 FOR MEDICARE OP, ALT 636 FOR OP/ED): Performed by: NURSE PRACTITIONER

## 2025-05-09 RX ORDER — OXYCODONE HYDROCHLORIDE 5 MG/1
2.5 TABLET ORAL EVERY 6 HOURS PRN
Refills: 0 | Status: DISCONTINUED | OUTPATIENT
Start: 2025-05-09 | End: 2025-05-16 | Stop reason: HOSPADM

## 2025-05-09 RX ORDER — LIDOCAINE 560 MG/1
1 PATCH PERCUTANEOUS; TOPICAL; TRANSDERMAL DAILY
Status: DISCONTINUED | OUTPATIENT
Start: 2025-05-10 | End: 2025-05-16 | Stop reason: HOSPADM

## 2025-05-09 RX ORDER — METHOCARBAMOL 500 MG/1
500 TABLET, FILM COATED ORAL EVERY 8 HOURS SCHEDULED
Status: DISCONTINUED | OUTPATIENT
Start: 2025-05-09 | End: 2025-05-14

## 2025-05-09 RX ORDER — ACETAMINOPHEN 325 MG/1
975 TABLET ORAL EVERY 8 HOURS
Status: DISCONTINUED | OUTPATIENT
Start: 2025-05-10 | End: 2025-05-16 | Stop reason: HOSPADM

## 2025-05-09 RX ADMIN — MELOXICAM 15 MG: 7.5 TABLET ORAL at 08:10

## 2025-05-09 RX ADMIN — SENNOSIDES 17.2 MG: 8.6 TABLET, FILM COATED ORAL at 08:10

## 2025-05-09 RX ADMIN — BUPROPION HYDROCHLORIDE 150 MG: 150 TABLET, EXTENDED RELEASE ORAL at 20:00

## 2025-05-09 RX ADMIN — METHOCARBAMOL 500 MG: 500 TABLET ORAL at 17:17

## 2025-05-09 RX ADMIN — NYSTATIN 500000 UNITS: 100000 SUSPENSION ORAL at 20:01

## 2025-05-09 RX ADMIN — OXYCODONE 10 MG: 5 TABLET ORAL at 14:07

## 2025-05-09 RX ADMIN — ASPIRIN 81 MG: 81 TABLET, CHEWABLE ORAL at 20:00

## 2025-05-09 RX ADMIN — ATORVASTATIN CALCIUM 20 MG: 10 TABLET, FILM COATED ORAL at 20:00

## 2025-05-09 RX ADMIN — BENZOCAINE AND MENTHOL 1 LOZENGE: 15; 3.6 LOZENGE ORAL at 08:39

## 2025-05-09 RX ADMIN — NYSTATIN 500000 UNITS: 100000 SUSPENSION ORAL at 14:06

## 2025-05-09 RX ADMIN — NYSTATIN 500000 UNITS: 100000 SUSPENSION ORAL at 08:10

## 2025-05-09 RX ADMIN — PROPRANOLOL HYDROCHLORIDE 120 MG: 60 CAPSULE, EXTENDED RELEASE ORAL at 08:10

## 2025-05-09 RX ADMIN — LISINOPRIL 20 MG: 20 TABLET ORAL at 08:10

## 2025-05-09 RX ADMIN — OXYCODONE 5 MG: 5 TABLET ORAL at 19:54

## 2025-05-09 RX ADMIN — BENZOCAINE AND MENTHOL 1 LOZENGE: 15; 3.6 LOZENGE ORAL at 14:07

## 2025-05-09 RX ADMIN — ENOXAPARIN SODIUM 40 MG: 40 INJECTION SUBCUTANEOUS at 08:09

## 2025-05-09 RX ADMIN — ESCITALOPRAM OXALATE 20 MG: 10 TABLET, FILM COATED ORAL at 20:01

## 2025-05-09 RX ADMIN — FUROSEMIDE 20 MG: 20 TABLET ORAL at 08:10

## 2025-05-09 RX ADMIN — MELATONIN TAB 3 MG 3 MG: 3 TAB at 20:00

## 2025-05-09 RX ADMIN — OXYCODONE 10 MG: 5 TABLET ORAL at 08:10

## 2025-05-09 ASSESSMENT — COGNITIVE AND FUNCTIONAL STATUS - GENERAL
MOVING TO AND FROM BED TO CHAIR: A LOT
WALKING IN HOSPITAL ROOM: A LOT
CLIMB 3 TO 5 STEPS WITH RAILING: TOTAL
PERSONAL GROOMING: A LITTLE
TURNING FROM BACK TO SIDE WHILE IN FLAT BAD: A LITTLE
MOVING FROM LYING ON BACK TO SITTING ON SIDE OF FLAT BED WITH BEDRAILS: A LITTLE
HELP NEEDED FOR BATHING: A LOT
STANDING UP FROM CHAIR USING ARMS: A LOT
STANDING UP FROM CHAIR USING ARMS: A LITTLE
TOILETING: A LOT
TURNING FROM BACK TO SIDE WHILE IN FLAT BAD: A LOT
MOBILITY SCORE: 13
CLIMB 3 TO 5 STEPS WITH RAILING: TOTAL
DAILY ACTIVITIY SCORE: 15
MOBILITY SCORE: 13
MOVING FROM LYING ON BACK TO SITTING ON SIDE OF FLAT BED WITH BEDRAILS: A LOT
DRESSING REGULAR LOWER BODY CLOTHING: A LOT
MOVING TO AND FROM BED TO CHAIR: A LITTLE
DRESSING REGULAR UPPER BODY CLOTHING: A LOT
WALKING IN HOSPITAL ROOM: A LOT

## 2025-05-09 ASSESSMENT — PAIN SCALES - GENERAL
PAINLEVEL_OUTOF10: 10 - WORST POSSIBLE PAIN
PAINLEVEL_OUTOF10: 10 - WORST POSSIBLE PAIN
PAINLEVEL_OUTOF10: 5 - MODERATE PAIN
PAINLEVEL_OUTOF10: 9
PAINLEVEL_OUTOF10: 4
PAINLEVEL_OUTOF10: 3
PAINLEVEL_OUTOF10: 9

## 2025-05-09 ASSESSMENT — PAIN - FUNCTIONAL ASSESSMENT
PAIN_FUNCTIONAL_ASSESSMENT: 0-10

## 2025-05-09 ASSESSMENT — PAIN DESCRIPTION - LOCATION
LOCATION: BACK
LOCATION: BACK

## 2025-05-09 ASSESSMENT — PAIN DESCRIPTION - DESCRIPTORS
DESCRIPTORS: ACHING;DISCOMFORT
DESCRIPTORS: ACHING
DESCRIPTORS: ACHING

## 2025-05-09 ASSESSMENT — PAIN DESCRIPTION - ORIENTATION: ORIENTATION: RIGHT;LEFT

## 2025-05-09 NOTE — PROGRESS NOTES
05/09/25 1254   Discharge Planning   Living Arrangements Children   Support Systems Children   Assistance Needed Pt met with LSw today and reports that her daughter Jason will be coming to stay with her to care for her when she leaves here. Pt states she will stay until pt is healed. Pt and LSW discussed pt not doing therapy today and she reports on having pain. Pt reports wanting to stay and then go home when she leaves here. Pt has paicked [facility] for Pt and OT.   Type of Residence Private residence   Who is requesting discharge planning? Patient   Home or Post Acute Services In home services   Type of Home Care Services Home OT;Home PT   Expected Discharge Disposition Home H  ([facility])   Does the patient need discharge transport arranged? No   Patient Choice   Provider Choice list and CMS website (https://medicare.gov/care-compare#search) for post-acute Quality and Resource Measure Data were provided and reviewed with: Patient   Patient / Family choosing to utilize agency / facility established prior to hospitalization No   Stroke Family Assessment   Stroke Family Assessment Needed No   Intensity of Service   Intensity of Service 0-30 min     JENNIFER OLIVAS

## 2025-05-09 NOTE — PROGRESS NOTES
"Occupational Therapy                 Therapy Communication Note    Patient Name: Karina Lee  MRN: [id number]  Department: [facility]  Room: [facility]  Today's Date: 5/9/2025     Discipline: Occupational Therapy    OT Missed Visit: Yes     Missed Visit Reason: Missed Visit Reason: Patient refused    Missed Time: Attempt    Comment: Prior to entering room nursing staff communicated pt not wanting to remain upright and requesting to get back to bed after breakfast. Upon entrance into room, pt seated in recliner observably distressed and states \"every move I make 10/10 pain is experienced.\". Therapist communicating with pt today's plan (shower) which was discussed prior day- pt agreeable. Today, after mentioning plan pt began shaking head 'no' and states \"no way can I do a shower today. I won't be able to.\". Pt not wanting to participate at this time. Pt did just also finish with PT prior to OT attempt. Will re-attempt as schedule allows.   "

## 2025-05-09 NOTE — H&P
History of Present Illness  Karina Lee is a 84 y.o. female  with PMHx significant for fall to right knee resulting in fracture of the distal right femur s/p IMN on 5/5/25, prostate cancer, lung cancer, papillary thyroid cancer who presented to [facility] due to need for physical therapy.     12 Point ROS negative unless noted in above HPI     Past Medical History  Medical History[1]    Surgical History  Surgical History[2]     Social History  She reports that she has never smoked. She has never been exposed to tobacco smoke. She has never used smokeless tobacco. She reports that she does not use drugs. No history on file for alcohol use.    Allergies  Anesthetics - sonja type- parabens, Cephalexin, Nitrofurantoin macrocrystal, Tetanus vaccines and toxoid, Tramadol, Oxycodone-acetaminophen, and Sulfa (sulfonamide antibiotics)     Vitals reviewed.   Constitutional:       Appearance: Normal appearance. She is obese.   HENT:      Head: Normocephalic and atraumatic.      Right Ear: External ear normal.      Left Ear: External ear normal.      Nose: clear     Mouth/Throat: clear     Mouth: Mucous membranes are moist.      Pharynx: Oropharynx is clear.   Eyes:      Conjunctiva/sclera: Conjunctivae normal.      Pupils: Pupils are equal, round, and reactive to light.   Cardiovascular:      Rate and Rhythm: Normal rate and regular rhythm.      Pulses: Normal pulses.      Heart sounds: Normal heart sounds.   Pulmonary:      Effort: Pulmonary effort is normal.      Breath sounds: Normal breath sounds.   Abdominal:      General: Bowel sounds are normal.      Palpations: Abdomen is soft.   Musculoskeletal:         General: Swelling and tenderness present. Right hip.     Cervical back: Normal range of motion and neck supple.      Comments: Limited ROM to right leg    ~brace right knee area. Multiple dressings noted to right knee with generalized edema  Skin:     General: Skin is warm and dry.      Findings: Bruising present.  "  Neurological:      General: No focal deficit present.      Mental Status: She is alert and oriented to person, place, and time.   Psychiatric:         Mood and Affect: Mood normal.         Behavior: Behavior normal.      Last Recorded Vitals  Blood pressure 118/58, pulse 54, temperature 36.5 °C (97.7 °F), temperature source Temporal, resp. rate 18, height 1.676 m (5' 5.98\"), weight 89.4 kg (197 lb), SpO2 96%.    Relevant Results  Scheduled medications  aspirin, 81 mg, oral, Daily  atorvastatin, 20 mg, oral, Daily  buPROPion XL, 150 mg, oral, Daily  enoxaparin, 40 mg, subcutaneous, Daily  escitalopram, 20 mg, oral, Daily  furosemide, 20 mg, oral, Daily  lisinopril, 20 mg, oral, Daily  melatonin, 3 mg, oral, Nightly  meloxicam, 15 mg, oral, Daily  nystatin, 5 mL, Swish & Swallow, TID  propranolol LA, 120 mg, oral, Daily  sennosides, 2 tablet, oral, Daily    Continuous medications  Continuous Medications[3]  PRN medications  PRN Medications[4]     No results found for this or any previous visit (from the past 24 hours).     Imaging  Imaging  No results found.    Cardiology, Vascular, and Other Imaging  No other imaging results found for the past 2 days       Assessment/Plan  No problem-specific Assessment & Plan notes found for this encounter.       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)   Right Greater trochanter of femur fracture  Fall at home  S/p IM nailing 5/5/25  - PT/OT evaluation  - WBAT  - Follow up with orthopedic outpatient    Syncope and collapse  ASHD  Dyslipidemia  Essential HTN  - troponin 12 > 11  - continue furosemide 20 mg daily, aspirin 81 mg daily, atorvastatin 20 mg daily  - lisinopril 20 mg daily  - monitor BP     Chronic kidney failure, Stage 3a  Urine retention  - baseline creatine 1.18  - monitor renal function  - renally dose medication  - bladder scan PRN                 Thrombocytopenia  Normocytic anemia  -   - Hb 11.3;   - " monitor CBC     Anxiety and depression  - continue bupropion 150 mg daily, escitalopram 20 mg daily, propranolol 120 mg daily     DVT ppx  - enoxaparin 40 mg daily    Disposition: Admitted 2/2 s/p right distal femur repair and need for inpatient physical therapy    I spent 35 minutes in the professional and overall care of this patient.      Kerri Robin, APRN-CNP  Internal Medicine         [1]   Past Medical History:  Diagnosis Date    Acute upper respiratory infection, unspecified 01/07/2015    Acute URI    Age-related osteoporosis without current pathological fracture     Osteoporosis    Anxiety     Anxiety disorder, unspecified     Anxiety    Arthritis     Cerebral infarction, unspecified 01/25/2018    CVA (cerebral vascular accident)    Dermatitis due to ingested food 03/03/2015    Allergic dermatitis due ingested food    Dizziness and giddiness 01/25/2018    Dizziness, nonspecific    Fatty (change of) liver, not elsewhere classified     Nonalcoholic fatty liver disease    GERD (gastroesophageal reflux disease)     Hypertension     Nutritional intake less than body requirements 01/03/2024    Other conditions influencing health status 01/25/2018    Asymmetrical hearing loss of both ears    Pain 01/03/2024    Pain in unspecified hip 01/25/2018    Hip pain    Personal history of other diseases of the circulatory system 01/19/2015    History of hypertension    Personal history of other diseases of the digestive system 01/25/2018    History of gastroesophageal reflux (GERD)    Personal history of other diseases of the musculoskeletal system and connective tissue     Personal history of arthritis    Personal history of other diseases of the nervous system and sense organs 02/04/2015    History of acute otitis externa    Personal history of other diseases of urinary system 03/05/2014    History of renal insufficiency syndrome    Personal history of other endocrine, nutritional and metabolic disease 01/25/2018     History of hyperlipidemia    Personal history of other specified conditions 01/19/2015    History of headache    Personal history of other specified conditions 06/12/2019    History of diarrhea    Personal history of transient ischemic attack (TIA), and cerebral infarction without residual deficits 10/23/2017    History of cerebrovascular accident    Stroke (Multi)     Unspecified hearing loss, unspecified ear 01/25/2018    Hearing difficulty   [2]   Past Surgical History:  Procedure Laterality Date    ADENOIDECTOMY  01/19/2015    Adenoidectomy    BACK SURGERY  11/26/2013    Back Surgery    CHOLECYSTECTOMY  11/26/2013    Cholecystectomy    CT ANGIO NECK  6/3/2017    CT NECK ANGIO W AND WO IV CONTRAST 6/3/2017 [facility]ICAL [facility]GAC[facility]    CT HEAD ANGIO W AND WO IV CONTRAST  6/3/2017    CT HEAD ANGIO W AND WO IV CONTRAST 6/3/2017 [facility] CLINICAL LEGAC[facility]    KNEE SURGERY  11/26/2013    Knee Surgery    TONSILLECTOMY  01/16/2014    Tonsillectomy    TOTAL HIP ARTHROPLASTY  01/04/2017    Hip Replacement   [3]    [4] PRN medications: benzocaine-menthol, magnesium hydroxide, ondansetron ODT, oxyCODONE, oxyCODONE

## 2025-05-09 NOTE — PROGRESS NOTES
Occupational Therapy                 Therapy Communication Note    Patient Name: Karina Lee  MRN: [id number]  Department: [facility]  Room: [address] [place], [place] [address]  Today's Date: 5/9/2025     Discipline: Occupational Therapy    OT Missed Visit: Yes     Missed Visit Reason: Missed Visit Reason: Patient refused    Missed Time: Attempt    Comment: Pt supine in bed, awake. States she is feeling better from earlier but not well enough to participate with therapy. Wants to rest the remainder of the night. Also c/c of sore throat and that it is difficult to swallow. RN notified.

## 2025-05-09 NOTE — PROGRESS NOTES
Physical Therapy    Physical Therapy Treatment    Patient Name: Karina Lee  MRN: [id number]  Department: [facility]  Room: [address] [place] [address]  Today's Date: 5/9/2025  Time Calculation  Start Time: 0903  Stop Time: 0940  Time Calculation (min): 37 min    Assessment/Plan   PT Assessment  PT Assessment Results: Decreased strength, Decreased endurance, Decreased range of motion, Impaired balance, Decreased mobility, Pain  Rehab Prognosis: Fair  Barriers to Discharge Home: Physical needs, Caregiver assistance  Caregiver Assistance: Patient lives alone and/or does not have reliable caregiver assistance  Physical Needs: Ambulating household distances limited by function/safety, Intermittent mobility assistance needed, High falls risk due to function or environment  Evaluation/Treatment Tolerance: Patient limited by pain, Patient limited by fatigue  Medical Staff Made Aware: Yes  End of Session Communication: Bedside nurse, PCT/NA/PRATIMA  End of Session Patient Position: Up in chair, Alarm on  PT Plan  Inpatient/Swing Bed or Outpatient: Swing Bed  PT Plan  Treatment/Interventions: Transfer training, Gait training  PT Plan: Ongoing PT  PT Frequency: BID  PT Discharge Recommendations: Low intensity level of continued care  Equipment Recommended upon Discharge: Wheeled walker  PT Recommended Transfer Status: Assist x1  PT - OK to Discharge: Yes      General Visit Information:   PT  Visit  PT Received On: 05/09/25  Response to Previous Treatment: Patient with no complaints from previous session.  General  Reason for Referral: Decresed mobility  Referred By: WILLA Fitzpatrick-CNP  Past Medical History Relevant to Rehab: Acute URI, osteoporosis, anxiety, OA, CVA, GERD, HTN  Prior to Session Communication: Bedside nurse, PCT/NA/PRATIMA  Patient Position Received: Bed, 2 rail up, Alarm on  Preferred Learning Style: visual, verbal  General Comment: Patient seems to have a lot of anxiety    Subjective   Precautions:  Precautions  Hearing/Visual  Limitations: Wear hearing aid/glasses  LE Weight Bearing Status: Weight Bearing as Tolerated  Medical Precautions: Fall precautions  Precautions Comment: R hip fx non operable     Objective   Pain:  Pain Assessment  Pain Assessment: 0-10  0-10 (Numeric) Pain Score: 9  Pain Type: Acute pain  Pain Location: Hip  Pain Orientation: Right  Pain Descriptors: Aching  Pain Interventions: Medication (See MAR), Repositioned, Cold applied  Cognition:  Cognition  Overall Cognitive Status: Within Functional Limits  Orientation Level: Oriented X4  Coordination:  Movements are Fluid and Coordinated: Yes  Postural Control:  Static Sitting Balance  Static Sitting-Level of Assistance: Close supervision  Dynamic Sitting Balance  Dynamic Sitting-Level of Assistance: Close supervision  Static Standing Balance  Static Standing-Level of Assistance: Close supervision, Contact guard  Dynamic Standing Balance  Dynamic Standing-Level of Assistance: Minimum assistance  Extremity/Trunk Assessments:    Activity Tolerance:  Activity Tolerance  Endurance: Tolerates less than 10 min exercise, no significant change in vital signs  Treatments:  Therapeutic Activity  Therapeutic Activity Performed: Yes  Therapeutic Activity 1: Standing to pull up briefs    Bed Mobility  Bed Mobility: Yes  Bed Mobility 1  Bed Mobility 1: Sitting to supine  Level of Assistance 1: Moderate assistance, Minimum assistance  Bed Mobility Comments 1: Leg     Ambulation/Gait Training  Ambulation/Gait Training Performed: Yes  Ambulation/Gait Training 1  Surface 1: Level tile  Device 1: Rolling walker  Gait Support Devices: Gait belt  Assistance 1: Minimum assistance  Quality of Gait 1: Antalgic, Inconsistent stride length, Decreased step length  Comments/Distance (ft) 1: 5 ft x3 today with soft knees and C/O pain  Transfers  Transfer: Yes  Transfer 1  Transfer From 1: Sit to  Transfer to 1: Stand  Technique 1: Sit to stand, Stand to sit  Transfer Device 1: Walker, Gait  belt  Transfer Level of Assistance 1: Minimum assistance, Moderate assistance  Trials/Comments 1: mod assist to stand today along with verbal cues for controlled decent  Transfers 2  Transfer From 2: Stand to  Transfer to 2: Bed  Technique 2: Stand to sit  Transfer Device 2: Gait belt  Transfer Level of Assistance 2: Contact guard    Outcome Measures:  [facility] Basic Mobility  Turning from your back to your side while in a flat bed without using bedrails: A little  Moving from lying on your back to sitting on the side of a flat bed without using bedrails: A little  Moving to and from bed to chair (including a wheelchair): A lot  Standing up from a chair using your arms (e.g. wheelchair or bedside chair): A lot  To walk in hospital room: A lot  Climbing 3-5 steps with railing: Total  Basic Mobility - Total Score: 13    Encounter Problems       Encounter Problems (Active)       PT Problem       pt will be able to ambulate 20 ft with FWW and modified independent  (Progressing)       Start:  05/05/25    Expected End:  05/26/25            Pt will be able to navigate 1 4 inch threshold with no hand rail and modified independent to allow for safe DC.   (Progressing)       Start:  05/05/25    Expected End:  05/26/25            Pt will be able to perform STS transfer with modified independent  (Progressing)       Start:  05/05/25    Expected End:  05/26/25            pt will be able to propel self home distances with manual WC and modified independent  (Progressing)       Start:  05/05/25    Expected End:  05/26/25            Pt will demonstrate ability to  object from ground from sitting position, without a device and mod I (Progressing)       Start:  05/05/25    Expected End:  05/26/25            Pt will be able to navigate in narrow as needed w FWW to allow for entrance into home bathroom. (Progressing)       Start:  05/05/25    Expected End:  05/26/25               Pain - Adult

## 2025-05-10 PROCEDURE — 2500000005 HC RX 250 GENERAL PHARMACY W/O HCPCS: Performed by: NURSE PRACTITIONER

## 2025-05-10 PROCEDURE — 2500000001 HC RX 250 WO HCPCS SELF ADMINISTERED DRUGS (ALT 637 FOR MEDICARE OP): Performed by: NURSE PRACTITIONER

## 2025-05-10 PROCEDURE — 1900000001 HC SKILLED SWING ROOM

## 2025-05-10 PROCEDURE — 2500000002 HC RX 250 W HCPCS SELF ADMINISTERED DRUGS (ALT 637 FOR MEDICARE OP, ALT 636 FOR OP/ED): Performed by: NURSE PRACTITIONER

## 2025-05-10 PROCEDURE — 2500000001 HC RX 250 WO HCPCS SELF ADMINISTERED DRUGS (ALT 637 FOR MEDICARE OP): Performed by: INTERNAL MEDICINE

## 2025-05-10 PROCEDURE — 97530 THERAPEUTIC ACTIVITIES: CPT | Mod: GP,CQ

## 2025-05-10 PROCEDURE — 2500000005 HC RX 250 GENERAL PHARMACY W/O HCPCS: Performed by: INTERNAL MEDICINE

## 2025-05-10 PROCEDURE — 94760 N-INVAS EAR/PLS OXIMETRY 1: CPT

## 2025-05-10 PROCEDURE — 2500000004 HC RX 250 GENERAL PHARMACY W/ HCPCS (ALT 636 FOR OP/ED): Mod: JZ | Performed by: NURSE PRACTITIONER

## 2025-05-10 RX ADMIN — PROPRANOLOL HYDROCHLORIDE 120 MG: 60 CAPSULE, EXTENDED RELEASE ORAL at 08:57

## 2025-05-10 RX ADMIN — LIDOCAINE 4% 1 PATCH: 40 PATCH TOPICAL at 08:08

## 2025-05-10 RX ADMIN — NYSTATIN 500000 UNITS: 100000 SUSPENSION ORAL at 15:19

## 2025-05-10 RX ADMIN — FUROSEMIDE 20 MG: 20 TABLET ORAL at 08:09

## 2025-05-10 RX ADMIN — MELOXICAM 15 MG: 7.5 TABLET ORAL at 08:09

## 2025-05-10 RX ADMIN — SENNOSIDES 17.2 MG: 8.6 TABLET, FILM COATED ORAL at 08:09

## 2025-05-10 RX ADMIN — BENZOCAINE AND MENTHOL 1 LOZENGE: 15; 3.6 LOZENGE ORAL at 21:28

## 2025-05-10 RX ADMIN — NYSTATIN 500000 UNITS: 100000 SUSPENSION ORAL at 21:15

## 2025-05-10 RX ADMIN — ATORVASTATIN CALCIUM 20 MG: 10 TABLET, FILM COATED ORAL at 21:15

## 2025-05-10 RX ADMIN — BUPROPION HYDROCHLORIDE 150 MG: 150 TABLET, EXTENDED RELEASE ORAL at 21:15

## 2025-05-10 RX ADMIN — ACETAMINOPHEN 975 MG: 325 TABLET ORAL at 00:33

## 2025-05-10 RX ADMIN — BENZOCAINE AND MENTHOL 1 LOZENGE: 15; 3.6 LOZENGE ORAL at 15:26

## 2025-05-10 RX ADMIN — ENOXAPARIN SODIUM 40 MG: 40 INJECTION SUBCUTANEOUS at 08:08

## 2025-05-10 RX ADMIN — MELATONIN TAB 3 MG 3 MG: 3 TAB at 21:15

## 2025-05-10 RX ADMIN — ESCITALOPRAM OXALATE 20 MG: 10 TABLET, FILM COATED ORAL at 21:15

## 2025-05-10 RX ADMIN — ACETAMINOPHEN 975 MG: 325 TABLET ORAL at 08:09

## 2025-05-10 RX ADMIN — ACETAMINOPHEN 975 MG: 325 TABLET ORAL at 23:04

## 2025-05-10 RX ADMIN — ASPIRIN 81 MG: 81 TABLET, CHEWABLE ORAL at 21:15

## 2025-05-10 RX ADMIN — NYSTATIN 500000 UNITS: 100000 SUSPENSION ORAL at 08:08

## 2025-05-10 RX ADMIN — LISINOPRIL 20 MG: 20 TABLET ORAL at 08:09

## 2025-05-10 RX ADMIN — ACETAMINOPHEN 975 MG: 325 TABLET ORAL at 15:17

## 2025-05-10 ASSESSMENT — PAIN - FUNCTIONAL ASSESSMENT
PAIN_FUNCTIONAL_ASSESSMENT: 0-10

## 2025-05-10 ASSESSMENT — COGNITIVE AND FUNCTIONAL STATUS - GENERAL
MOVING FROM LYING ON BACK TO SITTING ON SIDE OF FLAT BED WITH BEDRAILS: A LITTLE
STANDING UP FROM CHAIR USING ARMS: A LITTLE
MOBILITY SCORE: 15
WALKING IN HOSPITAL ROOM: A LITTLE
TURNING FROM BACK TO SIDE WHILE IN FLAT BAD: A LOT
MOVING TO AND FROM BED TO CHAIR: A LITTLE
CLIMB 3 TO 5 STEPS WITH RAILING: TOTAL

## 2025-05-10 ASSESSMENT — PAIN SCALES - GENERAL
PAINLEVEL_OUTOF10: 7
PAINLEVEL_OUTOF10: 9
PAINLEVEL_OUTOF10: 0 - NO PAIN

## 2025-05-10 ASSESSMENT — PAIN DESCRIPTION - LOCATION: LOCATION: HIP

## 2025-05-10 ASSESSMENT — PAIN DESCRIPTION - ORIENTATION: ORIENTATION: RIGHT

## 2025-05-10 NOTE — CARE PLAN
Patient had an uneventful shift. Patient requested to get back into bed following each meal. Patient received scheduled tylenol for pain in right hip, as well as a lidocaine placed this morning. Patient had a fair appetite today. Patient did not have a BM this shift. Patient has call bell within reach, no needs at this time.

## 2025-05-10 NOTE — PROGRESS NOTES
Physical Therapy    Physical Therapy Treatment    Patient Name: Karina Lee  MRN: [id number]  Department: [facility]  Room: [address] [place] [address]  Today's Date: 5/10/2025  Time Calculation  Start Time: 0925  Stop Time: 0942  Time Calculation (min): 17 min         Assessment/Plan   PT Assessment  PT Assessment Results: Decreased strength, Decreased endurance, Decreased range of motion, Impaired balance, Decreased mobility, Pain  Rehab Prognosis: Fair  Barriers to Discharge Home: Physical needs, Caregiver assistance  Caregiver Assistance: Patient lives alone and/or does not have reliable caregiver assistance  Physical Needs: Ambulating household distances limited by function/safety, Intermittent mobility assistance needed, High falls risk due to function or environment  Evaluation/Treatment Tolerance: Patient limited by pain, Patient limited by fatigue  End of Session Communication: PCT/NA/CTA  Assessment Comment: Patient only willing to amb during today's session. Refusing to continue d/t pain/fatigue. Wanting to go back to bed as well, encouraged to stay up for a little bit longer.  End of Session Patient Position: Up in chair, Alarm on  PT Plan  Inpatient/Swing Bed or Outpatient: Swing Bed  PT Plan  Treatment/Interventions: Transfer training, Gait training  PT Plan: Ongoing PT  PT Frequency: BID  PT Discharge Recommendations: Low intensity level of continued care  Equipment Recommended upon Discharge: Wheeled walker  PT Recommended Transfer Status: Assist x1  PT - OK to Discharge: Yes      General Visit Information:   PT  Visit  PT Received On: 05/10/25  Response to Previous Treatment: Patient reporting fatigue but able to participate.  General  Reason for Referral: Decresed mobility  Referred By: Lee Ann Garnett, APRN-CNP  Past Medical History Relevant to Rehab: Acute URI, osteoporosis, anxiety, OA, CVA, GERD, HTN  Prior to Session Communication: Bedside nurse  Patient Position Received: Up in chair, Alarm on  General Comment:  Patient not very willing to participate in therapy, requring max encouragement with only fair success    Subjective   Precautions:  Precautions  Hearing/Visual Limitations: Wear hearing aid/glasses  LE Weight Bearing Status: Weight Bearing as Tolerated  Medical Precautions: Fall precautions  Precautions Comment: R hip fx non operable    Objective   Pain:  Pain Assessment  Pain Assessment: 0-10  0-10 (Numeric) Pain Score: 9  Pain Type: Acute pain  Pain Location: Hip  Pain Orientation: Right  Cognition:  Cognition  Overall Cognitive Status: Within Functional Limits (patient did appear slightly confused as she states she ate breakfast in another room)    Activity Tolerance:  Activity Tolerance  Endurance: Tolerates 10 - 20 min exercise with multiple rests  Treatments:  Ambulation/Gait Training  Ambulation/Gait Training Performed: Yes  Ambulation/Gait Training 1  Surface 1: Level tile  Device 1: Rolling walker  Gait Support Devices: Gait belt  Assistance 1: Contact guard, Moderate verbal cues  Quality of Gait 1: Antalgic, Inconsistent stride length, Decreased step length  Comments/Distance (ft) 1: 2x10ft with many directional turns- extended seated break between (patient refused to continue therapy after these amb trials)  Transfers  Transfer: Yes  Transfer 1  Transfer From 1: Chair with arms to  Transfer to 1: Stand  Technique 1: Sit to stand, Stand to sit  Transfer Device 1: Walker, Gait belt  Transfer Level of Assistance 1: Minimum assistance  Trials/Comments 1: mult trials completed    Outcome Measures:  [facility] Basic Mobility  Turning from your back to your side while in a flat bed without using bedrails: A little  Moving from lying on your back to sitting on the side of a flat bed without using bedrails: A lot  Moving to and from bed to chair (including a wheelchair): A little  Standing up from a chair using your arms (e.g. wheelchair or bedside chair): A little  To walk in hospital room: A little  Climbing 3-5  steps with railing: Total  Basic Mobility - Total Score: 15        Encounter Problems       Encounter Problems (Active)       PT Problem       pt will be able to ambulate 20 ft with FWW and modified independent  (Progressing)       Start:  05/05/25    Expected End:  05/26/25            Pt will be able to navigate 1 4 inch threshold with no hand rail and modified independent to allow for safe DC.   (Progressing)       Start:  05/05/25    Expected End:  05/26/25            Pt will be able to perform STS transfer with modified independent  (Progressing)       Start:  05/05/25    Expected End:  05/26/25            pt will be able to propel self home distances with manual WC and modified independent  (Progressing)       Start:  05/05/25    Expected End:  05/26/25            Pt will demonstrate ability to  object from ground from sitting position, without a device and mod I (Progressing)       Start:  05/05/25    Expected End:  05/26/25            Pt will be able to navigate in narrow as needed w FWW to allow for entrance into home bathroom. (Progressing)       Start:  05/05/25    Expected End:  05/26/25               Pain - Adult

## 2025-05-10 NOTE — CARE PLAN
The clinical goals for the shift include Patient will have an increased tolerance for ADLs throughout the shift    Over the shift, the patient did make progress toward the following goals. Pt resting quietly with uneventful night. New pain med order and meds changed from Dr King. Scheduled Tylenol given @ midnight. No c/o any needs at this time and no sign of acute distress. Continue POC and bed/chair alarm on. Call light within reach

## 2025-05-11 ENCOUNTER — APPOINTMENT (OUTPATIENT)
Dept: RADIOLOGY | Facility: HOSPITAL | Age: 85
DRG: 560 | End: 2025-05-11
Payer: MEDICARE

## 2025-05-11 VITALS
TEMPERATURE: 97.1 F | HEART RATE: 60 BPM | WEIGHT: 197 LBS | RESPIRATION RATE: 18 BRPM | SYSTOLIC BLOOD PRESSURE: 138 MMHG | DIASTOLIC BLOOD PRESSURE: 64 MMHG | OXYGEN SATURATION: 93 % | BODY MASS INDEX: 31.66 KG/M2 | HEIGHT: 66 IN

## 2025-05-11 LAB — S PYO DNA THROAT QL NAA+PROBE: NOT DETECTED

## 2025-05-11 PROCEDURE — 71045 X-RAY EXAM CHEST 1 VIEW: CPT | Performed by: RADIOLOGY

## 2025-05-11 PROCEDURE — 2500000005 HC RX 250 GENERAL PHARMACY W/O HCPCS: Performed by: INTERNAL MEDICINE

## 2025-05-11 PROCEDURE — 2500000002 HC RX 250 W HCPCS SELF ADMINISTERED DRUGS (ALT 637 FOR MEDICARE OP, ALT 636 FOR OP/ED): Performed by: NURSE PRACTITIONER

## 2025-05-11 PROCEDURE — 2500000001 HC RX 250 WO HCPCS SELF ADMINISTERED DRUGS (ALT 637 FOR MEDICARE OP): Performed by: NURSE PRACTITIONER

## 2025-05-11 PROCEDURE — 94760 N-INVAS EAR/PLS OXIMETRY 1: CPT

## 2025-05-11 PROCEDURE — 2500000005 HC RX 250 GENERAL PHARMACY W/O HCPCS: Performed by: NURSE PRACTITIONER

## 2025-05-11 PROCEDURE — 87651 STREP A DNA AMP PROBE: CPT | Performed by: INTERNAL MEDICINE

## 2025-05-11 PROCEDURE — 2500000004 HC RX 250 GENERAL PHARMACY W/ HCPCS (ALT 636 FOR OP/ED): Mod: JZ | Performed by: NURSE PRACTITIONER

## 2025-05-11 PROCEDURE — 2500000001 HC RX 250 WO HCPCS SELF ADMINISTERED DRUGS (ALT 637 FOR MEDICARE OP): Performed by: INTERNAL MEDICINE

## 2025-05-11 PROCEDURE — 87081 CULTURE SCREEN ONLY: CPT | Mod: CONLAB | Performed by: NURSE PRACTITIONER

## 2025-05-11 PROCEDURE — 71045 X-RAY EXAM CHEST 1 VIEW: CPT

## 2025-05-11 PROCEDURE — 1900000001 HC SKILLED SWING ROOM

## 2025-05-11 RX ORDER — LIDOCAINE HYDROCHLORIDE 20 MG/ML
15 SOLUTION OROPHARYNGEAL AS NEEDED
Status: DISCONTINUED | OUTPATIENT
Start: 2025-05-11 | End: 2025-05-16 | Stop reason: HOSPADM

## 2025-05-11 RX ORDER — GUAIFENESIN/DEXTROMETHORPHAN 100-10MG/5
5 SYRUP ORAL EVERY 4 HOURS PRN
Status: DISCONTINUED | OUTPATIENT
Start: 2025-05-11 | End: 2025-05-16 | Stop reason: HOSPADM

## 2025-05-11 RX ADMIN — ASPIRIN 81 MG: 81 TABLET, CHEWABLE ORAL at 21:19

## 2025-05-11 RX ADMIN — ATORVASTATIN CALCIUM 20 MG: 10 TABLET, FILM COATED ORAL at 21:19

## 2025-05-11 RX ADMIN — LIDOCAINE HYDROCHLORIDE 15 ML: 20 SOLUTION ORAL at 12:25

## 2025-05-11 RX ADMIN — NYSTATIN 500000 UNITS: 100000 SUSPENSION ORAL at 15:57

## 2025-05-11 RX ADMIN — SENNOSIDES 17.2 MG: 8.6 TABLET, FILM COATED ORAL at 08:57

## 2025-05-11 RX ADMIN — ACETAMINOPHEN 975 MG: 325 TABLET ORAL at 08:57

## 2025-05-11 RX ADMIN — OXYCODONE 2.5 MG: 5 TABLET ORAL at 21:19

## 2025-05-11 RX ADMIN — ENOXAPARIN SODIUM 40 MG: 40 INJECTION SUBCUTANEOUS at 08:56

## 2025-05-11 RX ADMIN — FUROSEMIDE 20 MG: 20 TABLET ORAL at 08:57

## 2025-05-11 RX ADMIN — MELATONIN TAB 3 MG 3 MG: 3 TAB at 21:19

## 2025-05-11 RX ADMIN — ACETAMINOPHEN 975 MG: 325 TABLET ORAL at 15:57

## 2025-05-11 RX ADMIN — OXYCODONE 2.5 MG: 5 TABLET ORAL at 06:45

## 2025-05-11 RX ADMIN — LISINOPRIL 20 MG: 20 TABLET ORAL at 08:56

## 2025-05-11 RX ADMIN — ESCITALOPRAM OXALATE 20 MG: 10 TABLET, FILM COATED ORAL at 21:19

## 2025-05-11 RX ADMIN — BENZOCAINE AND MENTHOL 1 LOZENGE: 15; 3.6 LOZENGE ORAL at 21:19

## 2025-05-11 RX ADMIN — MAGNESIUM HYDROXIDE 10 ML: 2400 SUSPENSION ORAL at 08:56

## 2025-05-11 RX ADMIN — BUPROPION HYDROCHLORIDE 150 MG: 150 TABLET, EXTENDED RELEASE ORAL at 21:19

## 2025-05-11 RX ADMIN — GUAIFENESIN AND DEXTROMETHORPHAN 5 ML: 100; 10 SYRUP ORAL at 12:25

## 2025-05-11 RX ADMIN — MELOXICAM 15 MG: 7.5 TABLET ORAL at 08:57

## 2025-05-11 RX ADMIN — BENZOCAINE AND MENTHOL 1 LOZENGE: 15; 3.6 LOZENGE ORAL at 06:45

## 2025-05-11 RX ADMIN — LIDOCAINE 4% 1 PATCH: 40 PATCH TOPICAL at 08:56

## 2025-05-11 RX ADMIN — NYSTATIN 500000 UNITS: 100000 SUSPENSION ORAL at 21:19

## 2025-05-11 RX ADMIN — ACETAMINOPHEN 975 MG: 325 TABLET ORAL at 23:58

## 2025-05-11 RX ADMIN — NYSTATIN 500000 UNITS: 100000 SUSPENSION ORAL at 08:56

## 2025-05-11 RX ADMIN — PROPRANOLOL HYDROCHLORIDE 120 MG: 60 CAPSULE, EXTENDED RELEASE ORAL at 08:56

## 2025-05-11 ASSESSMENT — PAIN - FUNCTIONAL ASSESSMENT
PAIN_FUNCTIONAL_ASSESSMENT: 0-10

## 2025-05-11 ASSESSMENT — PAIN DESCRIPTION - DESCRIPTORS
DESCRIPTORS: ACHING
DESCRIPTORS: ACHING

## 2025-05-11 ASSESSMENT — PAIN DESCRIPTION - ORIENTATION
ORIENTATION: RIGHT
ORIENTATION: LEFT

## 2025-05-11 ASSESSMENT — PAIN SCALES - GENERAL
PAINLEVEL_OUTOF10: 5 - MODERATE PAIN
PAINLEVEL_OUTOF10: 7
PAINLEVEL_OUTOF10: 0 - NO PAIN
PAINLEVEL_OUTOF10: 0 - NO PAIN

## 2025-05-11 ASSESSMENT — PAIN DESCRIPTION - LOCATION
LOCATION: EAR
LOCATION: HIP

## 2025-05-12 LAB
ANION GAP SERPL CALC-SCNC: 9 MMOL/L (ref 10–20)
APPEARANCE UR: ABNORMAL
BACTERIA #/AREA URNS AUTO: ABNORMAL /HPF
BILIRUB UR STRIP.AUTO-MCNC: NEGATIVE MG/DL
BUN SERPL-MCNC: 29 MG/DL (ref 6–23)
CALCIUM SERPL-MCNC: 9.3 MG/DL (ref 8.6–10.3)
CHLORIDE SERPL-SCNC: 103 MMOL/L (ref 98–107)
CO2 SERPL-SCNC: 26 MMOL/L (ref 21–32)
COLOR UR: YELLOW
CREAT SERPL-MCNC: 1.58 MG/DL (ref 0.5–1.05)
EGFRCR SERPLBLD CKD-EPI 2021: 32 ML/MIN/1.73M*2
ERYTHROCYTE [DISTWIDTH] IN BLOOD BY AUTOMATED COUNT: 13.1 % (ref 11.5–14.5)
GLUCOSE SERPL-MCNC: 139 MG/DL (ref 74–99)
GLUCOSE UR STRIP.AUTO-MCNC: NEGATIVE MG/DL
HCT VFR BLD AUTO: 38.8 % (ref 36–46)
HGB BLD-MCNC: 12.3 G/DL (ref 12–16)
KETONES UR STRIP.AUTO-MCNC: NEGATIVE MG/DL
LEUKOCYTE ESTERASE UR QL STRIP.AUTO: ABNORMAL
MCH RBC QN AUTO: 32.3 PG (ref 26–34)
MCHC RBC AUTO-ENTMCNC: 31.7 G/DL (ref 32–36)
MCV RBC AUTO: 102 FL (ref 80–100)
NITRITE UR QL STRIP.AUTO: NEGATIVE
NRBC BLD-RTO: 0 /100 WBCS (ref 0–0)
PH UR STRIP.AUTO: 7 [PH]
PLATELET # BLD AUTO: 198 X10*3/UL (ref 150–450)
POTASSIUM SERPL-SCNC: 4.5 MMOL/L (ref 3.5–5.3)
PROT UR STRIP.AUTO-MCNC: NEGATIVE MG/DL
RBC # BLD AUTO: 3.81 X10*6/UL (ref 4–5.2)
RBC # UR STRIP.AUTO: NEGATIVE MG/DL
RBC #/AREA URNS AUTO: ABNORMAL /HPF
SODIUM SERPL-SCNC: 133 MMOL/L (ref 136–145)
SP GR UR STRIP.AUTO: 1.01
SQUAMOUS #/AREA URNS AUTO: ABNORMAL /HPF
TRANS CELLS #/AREA UR COMP ASSIST: ABNORMAL /HPF
UROBILINOGEN UR STRIP.AUTO-MCNC: <2 MG/DL
WBC # BLD AUTO: 4.8 X10*3/UL (ref 4.4–11.3)
WBC #/AREA URNS AUTO: ABNORMAL /HPF
WBC CLUMPS #/AREA URNS AUTO: ABNORMAL /HPF

## 2025-05-12 PROCEDURE — 94760 N-INVAS EAR/PLS OXIMETRY 1: CPT

## 2025-05-12 PROCEDURE — 80048 BASIC METABOLIC PNL TOTAL CA: CPT | Performed by: NURSE PRACTITIONER

## 2025-05-12 PROCEDURE — 36415 COLL VENOUS BLD VENIPUNCTURE: CPT | Performed by: NURSE PRACTITIONER

## 2025-05-12 PROCEDURE — 97110 THERAPEUTIC EXERCISES: CPT | Mod: GP,CQ

## 2025-05-12 PROCEDURE — 97535 SELF CARE MNGMENT TRAINING: CPT | Mod: GO

## 2025-05-12 PROCEDURE — 2500000005 HC RX 250 GENERAL PHARMACY W/O HCPCS: Performed by: NURSE PRACTITIONER

## 2025-05-12 PROCEDURE — 97542 WHEELCHAIR MNGMENT TRAINING: CPT | Mod: GP,CQ

## 2025-05-12 PROCEDURE — 2500000001 HC RX 250 WO HCPCS SELF ADMINISTERED DRUGS (ALT 637 FOR MEDICARE OP): Performed by: NURSE PRACTITIONER

## 2025-05-12 PROCEDURE — 97116 GAIT TRAINING THERAPY: CPT | Mod: GP,CQ

## 2025-05-12 PROCEDURE — 81001 URINALYSIS AUTO W/SCOPE: CPT | Performed by: NURSE PRACTITIONER

## 2025-05-12 PROCEDURE — 2500000002 HC RX 250 W HCPCS SELF ADMINISTERED DRUGS (ALT 637 FOR MEDICARE OP, ALT 636 FOR OP/ED): Performed by: NURSE PRACTITIONER

## 2025-05-12 PROCEDURE — 87077 CULTURE AEROBIC IDENTIFY: CPT | Mod: CONLAB | Performed by: NURSE PRACTITIONER

## 2025-05-12 PROCEDURE — 1900000001 HC SKILLED SWING ROOM

## 2025-05-12 PROCEDURE — 97530 THERAPEUTIC ACTIVITIES: CPT | Mod: GP,CQ

## 2025-05-12 PROCEDURE — 2500000001 HC RX 250 WO HCPCS SELF ADMINISTERED DRUGS (ALT 637 FOR MEDICARE OP): Performed by: INTERNAL MEDICINE

## 2025-05-12 PROCEDURE — 2500000004 HC RX 250 GENERAL PHARMACY W/ HCPCS (ALT 636 FOR OP/ED): Mod: JZ | Performed by: NURSE PRACTITIONER

## 2025-05-12 PROCEDURE — 85027 COMPLETE CBC AUTOMATED: CPT | Performed by: NURSE PRACTITIONER

## 2025-05-12 RX ORDER — GRANULES FOR ORAL 3 G/1
3 POWDER ORAL ONCE
Status: COMPLETED | OUTPATIENT
Start: 2025-05-12 | End: 2025-05-12

## 2025-05-12 RX ADMIN — BENZOCAINE AND MENTHOL 1 LOZENGE: 15; 3.6 LOZENGE ORAL at 21:20

## 2025-05-12 RX ADMIN — ASPIRIN 81 MG: 81 TABLET, CHEWABLE ORAL at 21:13

## 2025-05-12 RX ADMIN — PROPRANOLOL HYDROCHLORIDE 120 MG: 60 CAPSULE, EXTENDED RELEASE ORAL at 09:00

## 2025-05-12 RX ADMIN — ESCITALOPRAM OXALATE 20 MG: 10 TABLET, FILM COATED ORAL at 21:13

## 2025-05-12 RX ADMIN — GRANULES FOR ORAL SOLUTION 3 G: 3 POWDER ORAL at 11:39

## 2025-05-12 RX ADMIN — ENOXAPARIN SODIUM 40 MG: 40 INJECTION SUBCUTANEOUS at 09:00

## 2025-05-12 RX ADMIN — MELATONIN TAB 3 MG 3 MG: 3 TAB at 21:13

## 2025-05-12 RX ADMIN — MELOXICAM 15 MG: 7.5 TABLET ORAL at 09:00

## 2025-05-12 RX ADMIN — NYSTATIN 500000 UNITS: 100000 SUSPENSION ORAL at 21:13

## 2025-05-12 RX ADMIN — ACETAMINOPHEN 975 MG: 325 TABLET ORAL at 08:59

## 2025-05-12 RX ADMIN — BUPROPION HYDROCHLORIDE 150 MG: 150 TABLET, EXTENDED RELEASE ORAL at 21:13

## 2025-05-12 RX ADMIN — NYSTATIN 500000 UNITS: 100000 SUSPENSION ORAL at 14:20

## 2025-05-12 RX ADMIN — ATORVASTATIN CALCIUM 20 MG: 10 TABLET, FILM COATED ORAL at 21:13

## 2025-05-12 RX ADMIN — ACETAMINOPHEN 975 MG: 325 TABLET ORAL at 16:30

## 2025-05-12 RX ADMIN — SENNOSIDES 17.2 MG: 8.6 TABLET, FILM COATED ORAL at 09:00

## 2025-05-12 RX ADMIN — NYSTATIN 500000 UNITS: 100000 SUSPENSION ORAL at 09:00

## 2025-05-12 RX ADMIN — LISINOPRIL 20 MG: 20 TABLET ORAL at 09:00

## 2025-05-12 RX ADMIN — FUROSEMIDE 20 MG: 20 TABLET ORAL at 09:00

## 2025-05-12 ASSESSMENT — COGNITIVE AND FUNCTIONAL STATUS - GENERAL
CLIMB 3 TO 5 STEPS WITH RAILING: TOTAL
STANDING UP FROM CHAIR USING ARMS: A LITTLE
MOVING FROM LYING ON BACK TO SITTING ON SIDE OF FLAT BED WITH BEDRAILS: A LITTLE
MOBILITY SCORE: 15
DRESSING REGULAR UPPER BODY CLOTHING: A LOT
MOVING FROM LYING ON BACK TO SITTING ON SIDE OF FLAT BED WITH BEDRAILS: A LITTLE
MOBILITY SCORE: 14
HELP NEEDED FOR BATHING: A LOT
MOVING TO AND FROM BED TO CHAIR: A LITTLE
DAILY ACTIVITIY SCORE: 18
DRESSING REGULAR LOWER BODY CLOTHING: A LOT
WALKING IN HOSPITAL ROOM: A LOT
HELP NEEDED FOR BATHING: A LITTLE
DRESSING REGULAR LOWER BODY CLOTHING: A LITTLE
TURNING FROM BACK TO SIDE WHILE IN FLAT BAD: A LOT
STANDING UP FROM CHAIR USING ARMS: A LITTLE
CLIMB 3 TO 5 STEPS WITH RAILING: TOTAL
MOVING TO AND FROM BED TO CHAIR: A LITTLE
DRESSING REGULAR UPPER BODY CLOTHING: A LITTLE
MOBILITY SCORE: 15
TOILETING: A LOT
WALKING IN HOSPITAL ROOM: A LITTLE
WALKING IN HOSPITAL ROOM: A LITTLE
TURNING FROM BACK TO SIDE WHILE IN FLAT BAD: A LOT
STANDING UP FROM CHAIR USING ARMS: A LITTLE
DAILY ACTIVITIY SCORE: 15
PERSONAL GROOMING: A LITTLE
MOVING FROM LYING ON BACK TO SITTING ON SIDE OF FLAT BED WITH BEDRAILS: A LITTLE
MOVING TO AND FROM BED TO CHAIR: A LITTLE
TOILETING: A LOT
TURNING FROM BACK TO SIDE WHILE IN FLAT BAD: A LOT
CLIMB 3 TO 5 STEPS WITH RAILING: TOTAL
PERSONAL GROOMING: A LITTLE

## 2025-05-12 ASSESSMENT — PAIN SCALES - GENERAL
PAINLEVEL_OUTOF10: 4
PAINLEVEL_OUTOF10: 0 - NO PAIN
PAINLEVEL_OUTOF10: 4

## 2025-05-12 ASSESSMENT — PAIN - FUNCTIONAL ASSESSMENT
PAIN_FUNCTIONAL_ASSESSMENT: 0-10

## 2025-05-12 ASSESSMENT — ACTIVITIES OF DAILY LIVING (ADL): HOME_MANAGEMENT_TIME_ENTRY: 36

## 2025-05-12 ASSESSMENT — PAIN DESCRIPTION - DESCRIPTORS: DESCRIPTORS: ACHING;THROBBING

## 2025-05-12 NOTE — PROGRESS NOTES
Physical Therapy    Physical Therapy Treatment    Patient Name: Karina Lee  MRN: [id number]  Department: [facility]  Room: [address] [place] [address]  Today's Date: 5/12/2025  Time Calculation  Start Time: 0816  Stop Time: 0852  Time Calculation (min): 36 min    Assessment/Plan   PT Assessment  PT Assessment Results: Decreased strength, Decreased endurance, Decreased range of motion, Impaired balance, Decreased mobility, Pain  Rehab Prognosis: Fair  Barriers to Discharge Home: Physical needs, Caregiver assistance  Caregiver Assistance: Patient lives alone and/or does not have reliable caregiver assistance  Physical Needs: Ambulating household distances limited by function/safety, Intermittent mobility assistance needed, High falls risk due to function or environment  End of Session Communication: PCT/NA/CTA, Bedside nurse  End of Session Patient Position: Up in chair, Alarm on  PT Plan  Inpatient/Swing Bed or Outpatient: Swing Bed  PT Plan  Treatment/Interventions: Transfer training, Gait training  PT Plan: Ongoing PT  PT Frequency: BID  PT Discharge Recommendations: Low intensity level of continued care  Equipment Recommended upon Discharge: Wheeled walker  PT Recommended Transfer Status: Assist x1  PT - OK to Discharge: Yes      General Visit Information:   PT  Visit  PT Received On: 05/12/25  Response to Previous Treatment: Patient with no complaints from previous session.  General  Reason for Referral: Decresed mobility  Referred By: WILLA Fitzpatrick-CNP  Past Medical History Relevant to Rehab: Acute URI, osteoporosis, anxiety, OA, CVA, GERD, HTN  Co-Treatment: OT  Co-Treatment Reason: Maximize pt safety with transfers and increase participation  Prior to Session Communication: Bedside nurse  Patient Position Received: Up in chair, Alarm on  Preferred Learning Style: visual, verbal  General Comment: Patient shows marked improvement over previous treatment    Subjective   Precautions:  Precautions  Hearing/Visual Limitations:  Wear hearing aid/glasses  LE Weight Bearing Status: Weight Bearing as Tolerated  Medical Precautions: Fall precautions  Precautions Comment: R hip fx non operable    Objective   Pain:  Pain Assessment  Pain Assessment: 0-10  0-10 (Numeric) Pain Score: 4 (With activity)  Pain Type: Acute pain  Pain Location: Hip  Pain Orientation: Right  Pain Interventions: Medication (See MAR), Distraction, Rest  Cognition:  Cognition  Orientation Level: Oriented X4  Coordination:  Movements are Fluid and Coordinated: Yes  Postural Control:  Postural Control  Postural Control: Within Functional Limits  Static Sitting Balance  Static Sitting-Level of Assistance: Modified independent  Dynamic Sitting Balance  Dynamic Sitting-Level of Assistance: Close supervision  Static Standing Balance  Static Standing-Level of Assistance: Close supervision, Contact guard  Dynamic Standing Balance  Dynamic Standing-Level of Assistance: Contact guard    Activity Tolerance:  Activity Tolerance  Endurance: Tolerates 10 - 20 min exercise with multiple rests  Treatments:    Ambulation/Gait Training  Ambulation/Gait Training Performed: Yes  Ambulation/Gait Training 1  Surface 1: Level tile  Device 1: Rolling walker  Gait Support Devices: Gait belt  Assistance 1: Contact guard  Quality of Gait 1: Decreased step length, Antalgic  Comments/Distance (ft) 1: 25 feet  Transfers  Transfer: Yes  Transfer 1  Transfer From 1: Chair with arms to  Transfer to 1: Stand  Technique 1: Sit to stand, Stand to sit  Transfer Device 1: Walker, Gait belt  Transfer Level of Assistance 1: Contact guard  Trials/Comments 1: Several trials during treatment  Transfers 2  Transfer From 2: Chair with arms to  Transfer to 2: Car  Technique 2: Stand pivot  Transfer Device 2: Walker  Transfer Level of Assistance 2: Contact guard  Trials/Comments 2: 1 trial    Outcome Measures:  [facility] Basic Mobility  Turning from your back to your side while in a flat bed without using bedrails: BROOK  little  Moving from lying on your back to sitting on the side of a flat bed without using bedrails: A lot  Moving to and from bed to chair (including a wheelchair): A little  Standing up from a chair using your arms (e.g. wheelchair or bedside chair): A little  To walk in hospital room: A little  Climbing 3-5 steps with railing: Total  Basic Mobility - Total Score: 15      Encounter Problems       Encounter Problems (Active)       PT Problem       pt will be able to ambulate 20 ft with FWW and modified independent  (Progressing)       Start:  05/05/25    Expected End:  05/26/25            Pt will be able to navigate 1 4 inch threshold with no hand rail and modified independent to allow for safe DC.   (Progressing)       Start:  05/05/25    Expected End:  05/26/25            Pt will be able to perform STS transfer with modified independent  (Progressing)       Start:  05/05/25    Expected End:  05/26/25            pt will be able to propel self home distances with manual WC and modified independent  (Progressing)       Start:  05/05/25    Expected End:  05/26/25            Pt will demonstrate ability to  object from ground from sitting position, without a device and mod I (Progressing)       Start:  05/05/25    Expected End:  05/26/25            Pt will be able to navigate in narrow as needed w FWW to allow for entrance into home bathroom. (Progressing)       Start:  05/05/25    Expected End:  05/26/25               Pain - Adult

## 2025-05-12 NOTE — CONSULTS
"Nutrition Follow Up Assessment:   Nutrition Assessment       Nutrition History:  Food and Nutrient History: Pt visited this AM, sleeping soundly in the recliner. According to the nursing flowsheet, %PO good. Pt discussed at IDT meeting, pt doing well in therapy and planning to discharge home on Friday. RDN will follow and monitor until that time.     Anthropometrics:  Height: 167.6 cm (5' 5.98\")   Weight: 89.4 kg (197 lb)   BMI (Calculated): 31.81  IBW/kg (Dietitian Calculated): 65 kg  Percent of IBW: 137 %     Weight Change %:  Weight History / % Weight Change: 05/04/25 - 89.4 kg; 06/30/23 - 91.2 kg  Significant Weight Loss: No    Nutrition Significant Labs:  BMP Trend:   Results from last 7 days   Lab Units 05/12/25  0630   GLUCOSE mg/dL 139*   CALCIUM mg/dL 9.3   SODIUM mmol/L 133*   POTASSIUM mmol/L 4.5   CO2 mmol/L 26   CHLORIDE mmol/L 103   BUN mg/dL 29*   CREATININE mg/dL 1.58*    , Renal Lab Trend:   Results from last 7 days   Lab Units 05/12/25  0630   POTASSIUM mmol/L 4.5   SODIUM mmol/L 133*   EGFR mL/min/1.73m*2 32*   BUN mg/dL 29*   CREATININE mg/dL 1.58*      Nutrition Specific Medications:  reviewed    I/O:   Last BM Date:  (patient stated its been 5 days); Stool Appearance: Formed (05/12/25 0615)    Dietary Orders (From admission, onward)       Start     Ordered    05/05/25 1321  Oral nutritional supplements  Until discontinued        Comments: Chocolate flavor   Question Answer Comment   Deliver with Lunch    Select supplement: Ensure Plus High Protein        05/05/25 1320    05/04/25 1431  May Participate in Room Service  ( ROOM SERVICE MAY PARTICIPATE)  Once        Question:  .  Answer:  Yes    05/04/25 1430    05/04/25 1220  Adult diet Regular  Diet effective now        Question:  Diet type  Answer:  Regular    05/04/25 1219                   Estimated Needs:   Total Energy Estimated Needs in 24 hours (kCal): 1800 kCal  Method for Estimating Needs: 25-30 Kcal/kg IBW  Total Protein Estimated " Needs in 24 Hours (g): 95 g  Method for Estimating 24 Hour Protein Needs: 1.0-1.2 gm/kg IBW  Total Fluid Estimated Needs in 24 Hours (mL): 1800 mL  Method for Estimating 24 Hour Fluid Needs: 1 mL/Kcal        Nutrition Diagnosis   Nutrition Diagnosis  Patient has Nutrition Diagnosis: No       Nutrition Interventions/Recommendations   Nutrition prescription for oral nutrition    Nutrition Recommendations:  Individualized Nutrition Prescription Provided for : Regular diet, ensure once daily per pt request; weekly weight monitor    Nutrition Interventions/Goals:   Interventions: Medical food supplement  Medical Food Supplement: Commercial beverage medical food supplement therapy  Goal: Continue Ensure plus high protein per pt request only  Coordination of Care with Providers: Nursing, Provider (IDT meeting)      Education Documentation  N/A - sleeping at time of visit      Nutrition Monitoring and Evaluation   Food/Nutrient Related History Monitoring  Additional Plans: No nutrition risk identified. Will follow and monitor per policy.              Time Spent (min): 30 minutes

## 2025-05-12 NOTE — CARE PLAN
The patient's goals for the shift include      The clinical goals for the shift include patient will work with therapy throughout swing program    Patient had uneventful shift. VSS. Patient up in chair for meals. Used call bell for all patient needs. Tolerating ordered medications this shift. Patient did well with therapy throughout shift.

## 2025-05-12 NOTE — SIGNIFICANT EVENT
Lab work reviewed.   UA 3+ leuks, 21-50 WBC, 3+ bacteria  Patient is asymptomatic  Fosfomycin 3GM X 1 dose  Follow culture.

## 2025-05-12 NOTE — PROGRESS NOTES
Occupational Therapy    Occupational Therapy Treatment    Name: Karina Lee  MRN: [id number]  Department: [facility]  Room: [address] [place] [address]  Date: 05/12/25  Time Calculation  Start Time: 0815  Stop Time: 0851  Time Calculation (min): 36 min    Assessment:  OT Assessment: RN cleared pt. PT/OT session to maximize pt's participation and tolerance to session. At this date, pt demonstrates improved tolerance to session with decreased pain sensations throughout. Pt demonstrated the best thus far functional mobility as evident by ability to mobilize within room to get to sink (grooming ADLs in stand) and then into the restroom (toilet transfer practice). Further, pt completed ~30 ft total ambulation from therapy gym to get back to room without rest break or significant pain sensations. Pt continues to benefit from OT services to address remaining goals and optimize safety prior to d/c home. At end, pt repositioned into recliner with alarm on. All needs met, call light in reach. RN notified.  Prognosis: Good  Barriers to Discharge Home: Caregiver assistance, Physical needs  Caregiver Assistance: Patient lives alone and/or does not have reliable caregiver assistance  Physical Needs: Ambulating household distances limited by function/safety, 24hr mobility assistance needed, Intermittent ADL assistance needed, High falls risk due to function or environment  Evaluation/Treatment Tolerance: Patient tolerated treatment well  Medical Staff Made Aware: Yes  End of Session Communication: Bedside nurse, PCT/NA/CTA  End of Session Patient Position: Up in chair, Alarm on    Plan:  OT - OK to Discharge: Yes    Subjective   Previous Visit Info:  OT Last Visit  OT Received On: 05/12/25    General:  General  Reason for Referral: Impaired ADLs  Referred By: Lee Ann Garnett, APRN-CNP  Past Medical History Relevant to Rehab: Acute URI, osteoporosis, anxiety, OA, CVA, GERD, HTN  Family/Caregiver Present: No  Co-Treatment: PT  Co-Treatment Reason:  Maximize pt safety with transfers and increase participation  Prior to Session Communication: Bedside nurse, PCT/NA/CTA  Patient Position Received: Up in chair, Alarm on  Preferred Learning Style: visual, verbal  General Comment: Pt seated in recliner, asleep but easy to awake. Agreeable to work with therapy and cooperative throughout. States she is feeling better from last Friday with improvements in pain.    Precautions:  Hearing/Visual Limitations: Wear hearing aid/glasses  LE Weight Bearing Status: Weight Bearing as Tolerated  Medical Precautions: Fall precautions  Precautions Comment: R hip fx non operable    Pain Assessment:  Pain Assessment  Pain Assessment: 0-10  0-10 (Numeric) Pain Score: 4 (Initially at start 4/10, with 8/10 experienced at end)  Pain Type: Acute pain  Pain Location: Hip  Pain Orientation: Right  Pain Descriptors: Aching, Throbbing  Pain Frequency: Intermittent  Pain Onset: Gradual  Clinical Progression: Gradually improving  Patient's Stated Pain Goal: No pain  Pain Interventions: Repositioned  Response to Interventions: Content/relaxed    Objective   Cognition:  Overall Cognitive Status: Within Functional Limits  Orientation Level: Oriented X4    Activities of Daily Living:   Grooming  Grooming Level of Assistance: Close supervision  Grooming Where Assessed: Standing sinkside  Grooming Comments: Completed hair care/wash face in stand positions. Items already at sink, pt able to manage/set-up items as needed including managing the sink.    Functional Standing Tolerance:  Functional Standing Tolerance  Time: ~10-15 minutes total  Activity: Functional mobility + grooming ADLs at sink  Functional Standing Tolerance Comments: No LOB, with increased 'speed' and less leaning observed.    Bed Mobility/Transfers:   Transfers  Transfer: Yes  Transfer 1  Transfer From 1: Sit to  Transfer to 1: Stand  Technique 1: Sit to stand, Stand to sit  Transfer Device 1: Walker, Gait belt  Transfer Level of  Assistance 1: Close supervision, Contact guard  Trials/Comments 1: multiple trials- varied surfaces (recliner, toilet, car) with consistent [place]. Increased ease observed compared to last week.    Toilet Transfers  Toilet Transfer From: Walker  Toilet Transfer Type: To and from  Toilet Transfer to: Raised toilet seat with rails  Toilet Transfer Technique: Ambulating  Toilet Transfers: Contact guard  Toilet Transfers Comments: 1 trial-- practice entering bathroom via side stepping to simulate at home set-up as pt has most falls occurring d/t small space and inability to walk in forward without hitting walls. Pt demonstrated an overall good transfer with consistent CGA and cueing to sequence. No LOB. Would benefit from continued practice to increase safety at home.  Car Transfers  Car Transfer From: Walker  Car Transfer Technique: Ambulating  Car Transfers: Contact guard  Car Transfers Comments: 1 trial- cueing to sequence for safety. Leg  not utilized, not required as pt able to manually lift RLE up/out of car minimally. No struggle observed.    Functional Mobility:  Functional Mobility  Functional Mobility Performed: Yes  Functional Mobility 1  Surface 1: Level tile  Device 1: Rolling walker  Functional Mobility Support Devices: Gait belt  Assistance 1: Close supervision, Contact guard  Comments 1: From 'car' in therapy gym back to room, about 30 feet total without rest break    Sitting Balance:  Static Sitting Balance  Static Sitting-Level of Assistance: Modified Independent  Dynamic Sitting Balance  Dynamic Sitting-Level of Assistance: Distant supervision, Close supervision    Standing Balance:  Static Standing Balance  Static Standing-Level of Assistance: Close supervision, Contact guard  Dynamic Standing Balance  Dynamic Standing-Level of Assistance: Close supervision, Contact guard    Outcome Measures:  [facility] Daily Activity  Putting on and taking off regular lower body clothing: A little  Bathing (including  washing, rinsing, drying): A little  Putting on and taking off regular upper body clothing: A little  Toileting, which includes using toilet, bedpan or urinal: A lot  Taking care of personal grooming such as brushing teeth: A little  Eating Meals: None  Daily Activity - Total Score: 18    Education Documentation  No documentation found.  Education Comments  No comments found.      Goals:  Encounter Problems       Encounter Problems (Active)       ADLs       Patient will perform UB and LB bathing with modified independent level of assistance and grab bars, shower chair, and long-handled sponge. (Progressing)       Start:  05/05/25    Expected End:  05/19/25            Patient with complete lower body dressing with modified independent level of assistance donning and doffing all LE clothes  with PRN adaptive equipment while supported sitting (Progressing)       Start:  05/05/25    Expected End:  05/19/25            Patient will complete daily grooming tasks with modified independent level of assistance and PRN adaptive equipment while supported sitting and standing. (Progressing)       Start:  05/05/25    Expected End:  05/19/25            Patient will complete toileting including hygiene clothing management/hygiene with modified independent level of assistance and raised toilet seat and grab bars.       Start:  05/05/25    Expected End:  05/19/25               BALANCE       Pt will maintain dynamic standing balance during ADL task with modified independent level of assistance in order to demonstrate decreased risk of falling and improved postural control. (Progressing)       Start:  05/05/25    Expected End:  05/19/25               MOBILITY          TRANSFERS       Patient will complete sit to stand transfer with modified independent level of assistance and least restrictive device in order to improve safety and prepare for out of bed mobility. (Progressing)       Start:  05/05/25    Expected End:  05/19/25

## 2025-05-12 NOTE — PROGRESS NOTES
November 16, 2020    Renetta Alicia  [address] [facility] [address]    Dear Renetta,       We recently received a refill request for Thyroid (ARMOUR THYROID).  We have refilled this for a one time 90 day supply only because you are due for a:    Physical office visit  Please call soon to schedule as physicals are scheduling out several months for   Dr. Arauz.      Please call at your earliest convenience so that there will not be a delay with your future refills.          Thank you,   Your [facility] Team/mkr  [phone]             Physical Therapy    Physical Therapy Treatment    Patient Name: Karina Lee  MRN: [id number]  Department: [facility]  Room: [address] [place] [address]  Today's Date: 5/12/2025  Time Calculation  Start Time: 1413  Stop Time: 1435  Time Calculation (min): 22 min      Assessment/Plan   PT Assessment  PT Assessment Results: Decreased strength, Decreased endurance, Decreased range of motion, Impaired balance, Decreased mobility, Pain  Rehab Prognosis: Fair  Barriers to Discharge Home: Physical needs, Caregiver assistance  Caregiver Assistance: Patient lives alone and/or does not have reliable caregiver assistance  Physical Needs: Ambulating household distances limited by function/safety, Intermittent mobility assistance needed, High falls risk due to function or environment  End of Session Communication: PCT/NA/CTA, Bedside nurse  End of Session Patient Position: Bed, 2 rail up, Alarm on  PT Plan  Inpatient/Swing Bed or Outpatient: Swing Bed  PT Plan  Treatment/Interventions: Transfer training, Gait training  PT Plan: Ongoing PT  PT Frequency: BID  PT Discharge Recommendations: Low intensity level of continued care  Equipment Recommended upon Discharge: Wheeled walker  PT Recommended Transfer Status: Assist x1  PT - OK to Discharge: Yes      General Visit Information:   PT  Visit  PT Received On: 05/12/25  Response to Previous Treatment: Patient with no complaints from previous session.  General  Reason for Referral: Decresed mobility  Referred By: Lee Ann Garnett APRN-CNP  Past Medical History Relevant to Rehab: Acute URI, osteoporosis, anxiety, OA, CVA, GERD, HTN  Co-Treatment: OT  Co-Treatment Reason: Maximize pt safety with transfers and increase participation  Prior to Session Communication: Bedside nurse, PCT/NA/CTA  Patient Position Received: Up in chair, Alarm on  Preferred Learning Style: visual, verbal  General Comment: Patient shows marked improvement over previous treatment    Subjective    Precautions:  Precautions  Hearing/Visual Limitations: Wear hearing aid/glasses  LE Weight Bearing Status: Weight Bearing as Tolerated  Medical Precautions: Fall precautions  Precautions Comment: R hip fx non operable      Objective   Pain:  Pain Assessment  Pain Assessment: 0-10  0-10 (Numeric) Pain Score: 4  Pain Type: Acute pain  Pain Location: Hip  Pain Orientation: Right  Pain Interventions: Medication (See MAR), Distraction, Rest  Cognition:  Cognition  Orientation Level: Oriented X4  Coordination:  Movements are Fluid and Coordinated: Yes  Postural Control:  Postural Control  Postural Control: Within Functional Limits  Static Sitting Balance  Static Sitting-Level of Assistance: Modified independent  Dynamic Sitting Balance  Dynamic Sitting-Level of Assistance: Close supervision  Static Standing Balance  Static Standing-Level of Assistance: Close supervision, Contact guard  Dynamic Standing Balance  Dynamic Standing-Level of Assistance: Contact guard    Activity Tolerance:  Activity Tolerance  Endurance: Tolerates 30 min exercise with multiple rests  Treatments:      Bed Mobility  Bed Mobility: Yes  Bed Mobility 1  Bed Mobility 1: Sitting to supine  Level of Assistance 1: Minimum assistance    Ambulation/Gait Training  Ambulation/Gait Training Performed: Yes  Ambulation/Gait Training 1  Surface 1: Level tile  Device 1: Rolling walker  Gait Support Devices: Gait belt  Assistance 1: Close supervision  Quality of Gait 1: Decreased step length, Antalgic  Comments/Distance (ft) 1: 25 feet  Wheelchair Activities  Wheelchair Type: Standard  Wheelchair Cushion: None  Propulsion: Yes  Method 1: Right upper extremity, Left upper extremity, Right lower extremity, Left lower extremity  Level of Assistance 1: Independent  Description/Details 1: 150 feet x 2 including 4 turns    Outcome Measures:  [facility] Basic Mobility  Turning from your back to your side while in a flat bed without using bedrails: A little  Moving from  lying on your back to sitting on the side of a flat bed without using bedrails: A lot  Moving to and from bed to chair (including a wheelchair): A little  Standing up from a chair using your arms (e.g. wheelchair or bedside chair): A little  To walk in hospital room: A little  Climbing 3-5 steps with railing: Total  Basic Mobility - Total Score: 15      Encounter Problems       Encounter Problems (Active)       PT Problem       pt will be able to ambulate 20 ft with FWW and modified independent  (Progressing)       Start:  05/05/25    Expected End:  05/26/25            Pt will be able to navigate 1 4 inch threshold with no hand rail and modified independent to allow for safe DC.   (Progressing)       Start:  05/05/25    Expected End:  05/26/25            Pt will be able to perform STS transfer with modified independent  (Progressing)       Start:  05/05/25    Expected End:  05/26/25            pt will be able to propel self home distances with manual WC and modified independent  (Progressing)       Start:  05/05/25    Expected End:  05/26/25            Pt will demonstrate ability to  object from ground from sitting position, without a device and mod I (Progressing)       Start:  05/05/25    Expected End:  05/26/25            Pt will be able to navigate in narrow as needed w FWW to allow for entrance into home bathroom. (Progressing)       Start:  05/05/25    Expected End:  05/26/25               Pain - Adult

## 2025-05-12 NOTE — CARE PLAN
The patient's goals for the shift include      The clinical goals for the shift include Patient will have a restful nights sleep.    Over the shift, the patient did  make progress toward the following goals. Patient had a restful nights sleep.

## 2025-05-13 LAB — S PYO THROAT QL CULT: NORMAL

## 2025-05-13 PROCEDURE — 1900000001 HC SKILLED SWING ROOM

## 2025-05-13 PROCEDURE — 2500000001 HC RX 250 WO HCPCS SELF ADMINISTERED DRUGS (ALT 637 FOR MEDICARE OP): Performed by: NURSE PRACTITIONER

## 2025-05-13 PROCEDURE — 2500000005 HC RX 250 GENERAL PHARMACY W/O HCPCS: Performed by: INTERNAL MEDICINE

## 2025-05-13 PROCEDURE — 97116 GAIT TRAINING THERAPY: CPT | Mod: GP,CQ

## 2025-05-13 PROCEDURE — 97530 THERAPEUTIC ACTIVITIES: CPT | Mod: GO

## 2025-05-13 PROCEDURE — 2500000004 HC RX 250 GENERAL PHARMACY W/ HCPCS (ALT 636 FOR OP/ED): Mod: JZ | Performed by: NURSE PRACTITIONER

## 2025-05-13 PROCEDURE — 97110 THERAPEUTIC EXERCISES: CPT | Mod: GP,CQ

## 2025-05-13 PROCEDURE — 2500000001 HC RX 250 WO HCPCS SELF ADMINISTERED DRUGS (ALT 637 FOR MEDICARE OP): Performed by: INTERNAL MEDICINE

## 2025-05-13 PROCEDURE — 94760 N-INVAS EAR/PLS OXIMETRY 1: CPT

## 2025-05-13 PROCEDURE — 97530 THERAPEUTIC ACTIVITIES: CPT | Mod: GP,CQ

## 2025-05-13 PROCEDURE — 2500000002 HC RX 250 W HCPCS SELF ADMINISTERED DRUGS (ALT 637 FOR MEDICARE OP, ALT 636 FOR OP/ED): Performed by: NURSE PRACTITIONER

## 2025-05-13 PROCEDURE — 2500000005 HC RX 250 GENERAL PHARMACY W/O HCPCS: Performed by: NURSE PRACTITIONER

## 2025-05-13 RX ADMIN — BUPROPION HYDROCHLORIDE 150 MG: 150 TABLET, EXTENDED RELEASE ORAL at 21:31

## 2025-05-13 RX ADMIN — SENNOSIDES 17.2 MG: 8.6 TABLET, FILM COATED ORAL at 08:40

## 2025-05-13 RX ADMIN — MELOXICAM 15 MG: 7.5 TABLET ORAL at 08:40

## 2025-05-13 RX ADMIN — BENZOCAINE AND MENTHOL 1 LOZENGE: 15; 3.6 LOZENGE ORAL at 15:57

## 2025-05-13 RX ADMIN — ASPIRIN 81 MG: 81 TABLET, CHEWABLE ORAL at 21:31

## 2025-05-13 RX ADMIN — NYSTATIN 500000 UNITS: 100000 SUSPENSION ORAL at 08:40

## 2025-05-13 RX ADMIN — MELATONIN TAB 3 MG 3 MG: 3 TAB at 21:30

## 2025-05-13 RX ADMIN — NYSTATIN 500000 UNITS: 100000 SUSPENSION ORAL at 21:30

## 2025-05-13 RX ADMIN — ESCITALOPRAM OXALATE 20 MG: 10 TABLET, FILM COATED ORAL at 21:31

## 2025-05-13 RX ADMIN — LIDOCAINE 4% 1 PATCH: 40 PATCH TOPICAL at 08:40

## 2025-05-13 RX ADMIN — PROPRANOLOL HYDROCHLORIDE 120 MG: 60 CAPSULE, EXTENDED RELEASE ORAL at 08:39

## 2025-05-13 RX ADMIN — ACETAMINOPHEN 975 MG: 325 TABLET ORAL at 08:40

## 2025-05-13 RX ADMIN — LISINOPRIL 20 MG: 20 TABLET ORAL at 08:40

## 2025-05-13 RX ADMIN — ACETAMINOPHEN 975 MG: 325 TABLET ORAL at 15:53

## 2025-05-13 RX ADMIN — FUROSEMIDE 20 MG: 20 TABLET ORAL at 08:40

## 2025-05-13 RX ADMIN — ENOXAPARIN SODIUM 40 MG: 40 INJECTION SUBCUTANEOUS at 08:40

## 2025-05-13 RX ADMIN — NYSTATIN 500000 UNITS: 100000 SUSPENSION ORAL at 15:56

## 2025-05-13 RX ADMIN — ATORVASTATIN CALCIUM 20 MG: 10 TABLET, FILM COATED ORAL at 21:30

## 2025-05-13 ASSESSMENT — COGNITIVE AND FUNCTIONAL STATUS - GENERAL
HELP NEEDED FOR BATHING: A LITTLE
DRESSING REGULAR LOWER BODY CLOTHING: A LOT
MOVING FROM LYING ON BACK TO SITTING ON SIDE OF FLAT BED WITH BEDRAILS: A LITTLE
CLIMB 3 TO 5 STEPS WITH RAILING: A LOT
CLIMB 3 TO 5 STEPS WITH RAILING: A LOT
TOILETING: A LITTLE
WALKING IN HOSPITAL ROOM: A LITTLE
MOVING TO AND FROM BED TO CHAIR: A LITTLE
PERSONAL GROOMING: A LITTLE
DRESSING REGULAR UPPER BODY CLOTHING: A LITTLE
DAILY ACTIVITIY SCORE: 17
TURNING FROM BACK TO SIDE WHILE IN FLAT BAD: A LOT
MOVING FROM LYING ON BACK TO SITTING ON SIDE OF FLAT BED WITH BEDRAILS: A LITTLE
TURNING FROM BACK TO SIDE WHILE IN FLAT BAD: A LITTLE
WALKING IN HOSPITAL ROOM: A LITTLE
STANDING UP FROM CHAIR USING ARMS: A LITTLE
STANDING UP FROM CHAIR USING ARMS: A LITTLE
WALKING IN HOSPITAL ROOM: A LITTLE
MOBILITY SCORE: 16
MOVING TO AND FROM BED TO CHAIR: A LITTLE
DRESSING REGULAR LOWER BODY CLOTHING: A LITTLE
PERSONAL GROOMING: A LITTLE
HELP NEEDED FOR BATHING: A LOT
DAILY ACTIVITIY SCORE: 19
MOBILITY SCORE: 17
DRESSING REGULAR UPPER BODY CLOTHING: A LITTLE
TURNING FROM BACK TO SIDE WHILE IN FLAT BAD: A LITTLE
MOVING TO AND FROM BED TO CHAIR: A LITTLE
MOVING FROM LYING ON BACK TO SITTING ON SIDE OF FLAT BED WITH BEDRAILS: A LITTLE
TOILETING: A LITTLE
CLIMB 3 TO 5 STEPS WITH RAILING: TOTAL
MOBILITY SCORE: 17

## 2025-05-13 ASSESSMENT — PAIN - FUNCTIONAL ASSESSMENT
PAIN_FUNCTIONAL_ASSESSMENT: 0-10

## 2025-05-13 ASSESSMENT — PAIN SCALES - GENERAL
PAINLEVEL_OUTOF10: 0 - NO PAIN

## 2025-05-13 NOTE — PROGRESS NOTES
Occupational Therapy    Occupational Therapy Treatment    Name: Karina Lee  MRN: [id number]  Department: [facility]  Room: [address] [place] [address]  Date: 05/13/25  Time Calculation  Start Time: 1333  Stop Time: 1412  Time Calculation (min): 39 min    Assessment:  OT Assessment: Patient making excellent goals towards safety in her bathroom. Pt reporting ready to go ho,me as dtr will be present though this therapist beleives a few more days necessary to ensure safety. Will contonue to engage in ADLs to increase safety for homegoing.  Prognosis: Good  Barriers to Discharge Home: Caregiver assistance, Physical needs  Caregiver Assistance: Patient lives alone and/or does not have reliable caregiver assistance  Physical Needs: Ambulating household distances limited by function/safety, 24hr mobility assistance needed, Intermittent ADL assistance needed, High falls risk due to function or environment  Evaluation/Treatment Tolerance: Patient tolerated treatment well  End of Session Communication: PCT/NA/CTA, Bedside nurse  End of Session Patient Position: Up in chair, Alarm on (PTA present)    Plan:  Treatment Interventions: ADL retraining, Functional transfer training, UE strengthening/ROM, Endurance training, Neuromuscular reeducation, Fine motor coordination activities, Compensatory technique education  OT Frequency: 5 times per week  OT Discharge Recommendations: Low intensity level of continued care  Equipment Recommended upon Discharge: Wheeled walker  OT Recommended Transfer Status: Assist of 1  OT - OK to Discharge: Yes  Based on completed evaluation and care plan recommendations, no barriers to discharge to next site of care.    Subjective   Previous Visit Info:  OT Last Visit  OT Received On: 05/13/25    General:  General  Reason for Referral: impaired ADLs  Referred By: Lee Ann Garnett, APRN-CNP  Past Medical History Relevant to Rehab: PMH: Acute URI, osteoporosis, anxiety, OA, CVA, GERD, HTN  Prior to Session Communication:  Bedside nurse, PCT/NA/CTA  Patient Position Received: Up in chair, Alarm on  Preferred Learning Style: visual, verbal  General Comment: Pt pleseant and cooperative throughout session. Left with all needs in reach following session.    Precautions:  Hearing/Visual Limitations: Wear hearing aid/glasses  LE Weight Bearing Status: Weight Bearing as Tolerated  Medical Precautions: Fall precautions  Precautions Comment: R hip fx non operable    Pain Assessment:  Pain Assessment  Pain Assessment: 0-10  0-10 (Numeric) Pain Score: 0 - No pain    Objective   Cognition:  Overall Cognitive Status: Within Functional Limits  Orientation Level: Oriented X4    Activities of Daily Living:   Grooming  Grooming Level of Assistance: Modified independent  Grooming Where Assessed: Sitting sinkside     Bed Mobility/Transfers:   Transfers  Transfer: Yes  Transfer 1  Transfer From 1: Chair with arms to  Transfer to 1: Stand  Technique 1: Sit to stand, Stand to sit, Stand pivot  Transfer Device 1: Walker, Gait belt  Transfer Level of Assistance 1: Distant supervision  Trials/Comments 1: Completed, multiple trials throughout simulated bathroom training with excellent safety. Minimal cues throughout    Toilet Transfers  Toilet Transfers Comments: simulating bathroom set up and use of couter to transfer on and off commode. Praiced x3 with education on safety during rest breaks in between. CXGA x2 then supervision on final practice attempt. Discussed with PTA attempting side stepping to incerase safety with getting in and out of bathroom. Patient also reporting dtr staying with her for x2 weeks. Educated on having dtr/therapist practice with her as well.    Functional Mobility:  Functional Mobility  Functional Mobility Performed: Yes  Functional Mobility 1  Surface 1: Level tile  Device 1: Rolling walker, Rollator  Functional Mobility Support Devices: Gait belt  Assistance 1: Close supervision, Contact guard  Comments 1: assist dependent on  device    Outcome Measures:  [facility] Daily Activity  Putting on and taking off regular lower body clothing: A little  Bathing (including washing, rinsing, drying): A little  Putting on and taking off regular upper body clothing: A little  Toileting, which includes using toilet, bedpan or urinal: A little  Taking care of personal grooming such as brushing teeth: A little  Eating Meals: None  Daily Activity - Total Score: 19    Goals:  Encounter Problems       Encounter Problems (Active)       ADLs       Patient will perform UB and LB bathing with modified independent level of assistance and grab bars, shower chair, and long-handled sponge. (Progressing)       Start:  05/05/25    Expected End:  05/19/25            Patient with complete lower body dressing with modified independent level of assistance donning and doffing all LE clothes  with PRN adaptive equipment while supported sitting (Progressing)       Start:  05/05/25    Expected End:  05/19/25            Patient will complete daily grooming tasks with modified independent level of assistance and PRN adaptive equipment while supported sitting and standing. (Progressing)       Start:  05/05/25    Expected End:  05/19/25            Patient will complete toileting including hygiene clothing management/hygiene with modified independent level of assistance and raised toilet seat and grab bars. (Progressing)       Start:  05/05/25    Expected End:  05/19/25               BALANCE       Pt will maintain dynamic standing balance during ADL task with modified independent level of assistance in order to demonstrate decreased risk of falling and improved postural control. (Progressing)       Start:  05/05/25    Expected End:  05/19/25               MOBILITY          TRANSFERS       Patient will complete sit to stand transfer with modified independent level of assistance and least restrictive device in order to improve safety and prepare for out of bed mobility. (Progressing)        Start:  05/05/25    Expected End:  05/19/25

## 2025-05-13 NOTE — PROGRESS NOTES
Physical Therapy    Physical Therapy Treatment    Patient Name: Kairna Lee  MRN: [id number]  Department: [facility]  Room: [address] [place] [address]  Today's Date: 5/13/2025  Time Calculation  Start Time: 0910  Stop Time: 0942  Time Calculation (min): 32 min      Assessment/Plan   PT Assessment  PT Assessment Results: Decreased strength, Decreased endurance, Decreased range of motion, Impaired balance, Decreased mobility, Pain  Rehab Prognosis: Fair  Barriers to Discharge Home: Physical needs  Caregiver Assistance: Patient lives alone and/or does not have reliable caregiver assistance  Physical Needs: Ambulating household distances limited by function/safety, Intermittent mobility assistance needed, High falls risk due to function or environment  End of Session Communication: PCT/NA/PRATIMA, Bedside nurse  End of Session Patient Position: Up in chair, Alarm on  PT Plan  Inpatient/Swing Bed or Outpatient: Swing Bed  PT Plan  Treatment/Interventions: Transfer training, Gait training  PT Plan: Ongoing PT  PT Frequency: BID  PT Discharge Recommendations: Low intensity level of continued care  Equipment Recommended upon Discharge: Wheeled walker  PT Recommended Transfer Status: Assist x1  PT - OK to Discharge: Yes      General Visit Information:   PT  Visit  PT Received On: 05/13/25  Response to Previous Treatment: Patient with no complaints from previous session.  General  Reason for Referral: Decresed mobility  Referred By: Lee Ann Garnett, APRN-CNP  Past Medical History Relevant to Rehab: Acute URI, osteoporosis, anxiety, OA, CVA, GERD, HTN  Prior to Session Communication: Bedside nurse, PCT/NA/CTA  Patient Position Received: Up in chair, Alarm on  Preferred Learning Style: visual, verbal  General Comment: Patient continues to show improvement    Subjective   Precautions:  Precautions  Hearing/Visual Limitations: Wear hearing aid/glasses  LE Weight Bearing Status: Weight Bearing as Tolerated  Medical Precautions: Fall  precautions  Precautions Comment: R hip fx non operable      Objective   Pain:  Pain Assessment  Pain Assessment: 0-10  0-10 (Numeric) Pain Score: 0 - No pain  Cognition:  Cognition  Orientation Level: Oriented X4  Coordination:  Movements are Fluid and Coordinated: Yes  Postural Control:  Postural Control  Postural Control: Within Functional Limits  Static Sitting Balance  Static Sitting-Level of Assistance: Modified independent  Dynamic Sitting Balance  Dynamic Sitting-Level of Assistance: Distant supervision  Static Standing Balance  Static Standing-Level of Assistance: Distant supervision  Dynamic Standing Balance  Dynamic Standing-Level of Assistance: Contact guard    Activity Tolerance:  Activity Tolerance  Endurance: Tolerates 30 min exercise with multiple rests  Treatments:  Therapeutic Exercise  Therapeutic Exercise Performed: Yes  Therapeutic Exercise Activity 1: Seated LAQ's 3# x25  Therapeutic Exercise Activity 2: Seated hip Flexions 3# x 25  Therapeutic Exercise Activity 3: Hipa add/abd GTB X 25      Ambulation/Gait Training  Ambulation/Gait Training Performed: Yes  Ambulation/Gait Training 1  Surface 1: Level tile  Device 1: Rolling walker  Gait Support Devices: Gait belt  Assistance 1: Distant supervision  Quality of Gait 1: Shuffling gait  Comments/Distance (ft) 1: 50 ft X's 2  Transfers  Transfer: Yes  Transfer 1  Transfer From 1: Chair with arms to  Transfer to 1: Stand  Technique 1: Sit to stand, Stand to sit  Transfer Device 1: Walker, Gait belt  Transfer Level of Assistance 1: Distant supervision  Trials/Comments 1: Several trials T/O treat    Stairs  Stairs: Yes  Stairs  Rails 1: Bilateral  Device 1: Railing  Support Devices 1: Gait belt  Assistance 1: Close supervision  Comment/Number of Steps 1: 1 step up    Outcome Measures:  [facility] Basic Mobility  Turning from your back to your side while in a flat bed without using bedrails: A little  Moving from lying on your back to sitting on the side of  a flat bed without using bedrails: A little  Moving to and from bed to chair (including a wheelchair): A little  Standing up from a chair using your arms (e.g. wheelchair or bedside chair): A little  To walk in hospital room: A little  Climbing 3-5 steps with railing: A lot  Basic Mobility - Total Score: 17    Encounter Problems       Encounter Problems (Active)       PT Problem       pt will be able to ambulate 20 ft with FWW and modified independent  (Progressing)       Start:  05/05/25    Expected End:  05/26/25            Pt will be able to navigate 1 4 inch threshold with no hand rail and modified independent to allow for safe DC.   (Progressing)       Start:  05/05/25    Expected End:  05/26/25            Pt will be able to perform STS transfer with modified independent  (Progressing)       Start:  05/05/25    Expected End:  05/26/25            pt will be able to propel self home distances with manual WC and modified independent  (Progressing)       Start:  05/05/25    Expected End:  05/26/25            Pt will demonstrate ability to  object from ground from sitting position, without a device and mod I (Progressing)       Start:  05/05/25    Expected End:  05/26/25            Pt will be able to navigate in narrow as needed w FWW to allow for entrance into home bathroom. (Progressing)       Start:  05/05/25    Expected End:  05/26/25               Pain - Adult

## 2025-05-13 NOTE — CARE PLAN
The clinical goals for the shift include Pt to participate in therapy    Pt is pleasant and cooperative, alert and orientated. Medication compliant. Participated in therapy, improving steadily. Pt able to make wants and needs known verbally. Encouraging fluids with poor/fair intake. Pt given cepacol for sore throat with good effect. No c/o pain voiced. Call bell within reach.

## 2025-05-14 LAB — BACTERIA UR CULT: ABNORMAL

## 2025-05-14 PROCEDURE — 97116 GAIT TRAINING THERAPY: CPT | Mod: GP

## 2025-05-14 PROCEDURE — 1900000001 HC SKILLED SWING ROOM

## 2025-05-14 PROCEDURE — 2500000005 HC RX 250 GENERAL PHARMACY W/O HCPCS: Performed by: NURSE PRACTITIONER

## 2025-05-14 PROCEDURE — 97110 THERAPEUTIC EXERCISES: CPT | Mod: GP

## 2025-05-14 PROCEDURE — 97535 SELF CARE MNGMENT TRAINING: CPT | Mod: GO

## 2025-05-14 PROCEDURE — 2500000001 HC RX 250 WO HCPCS SELF ADMINISTERED DRUGS (ALT 637 FOR MEDICARE OP): Performed by: INTERNAL MEDICINE

## 2025-05-14 PROCEDURE — 2500000001 HC RX 250 WO HCPCS SELF ADMINISTERED DRUGS (ALT 637 FOR MEDICARE OP): Performed by: NURSE PRACTITIONER

## 2025-05-14 PROCEDURE — 2500000004 HC RX 250 GENERAL PHARMACY W/ HCPCS (ALT 636 FOR OP/ED): Mod: JZ | Performed by: NURSE PRACTITIONER

## 2025-05-14 PROCEDURE — 2500000002 HC RX 250 W HCPCS SELF ADMINISTERED DRUGS (ALT 637 FOR MEDICARE OP, ALT 636 FOR OP/ED): Performed by: NURSE PRACTITIONER

## 2025-05-14 PROCEDURE — 94760 N-INVAS EAR/PLS OXIMETRY 1: CPT

## 2025-05-14 PROCEDURE — 2500000005 HC RX 250 GENERAL PHARMACY W/O HCPCS: Performed by: INTERNAL MEDICINE

## 2025-05-14 PROCEDURE — 97530 THERAPEUTIC ACTIVITIES: CPT | Mod: GP

## 2025-05-14 RX ORDER — DICLOFENAC SODIUM 10 MG/G
4 GEL TOPICAL 4 TIMES DAILY PRN
Status: DISCONTINUED | OUTPATIENT
Start: 2025-05-14 | End: 2025-05-16 | Stop reason: HOSPADM

## 2025-05-14 RX ADMIN — ACETAMINOPHEN 975 MG: 325 TABLET ORAL at 08:13

## 2025-05-14 RX ADMIN — NYSTATIN 500000 UNITS: 100000 SUSPENSION ORAL at 08:12

## 2025-05-14 RX ADMIN — ATORVASTATIN CALCIUM 20 MG: 10 TABLET, FILM COATED ORAL at 20:18

## 2025-05-14 RX ADMIN — ACETAMINOPHEN 975 MG: 325 TABLET ORAL at 15:32

## 2025-05-14 RX ADMIN — ESCITALOPRAM OXALATE 20 MG: 10 TABLET, FILM COATED ORAL at 20:18

## 2025-05-14 RX ADMIN — LIDOCAINE 4% 1 PATCH: 40 PATCH TOPICAL at 08:13

## 2025-05-14 RX ADMIN — ENOXAPARIN SODIUM 40 MG: 40 INJECTION SUBCUTANEOUS at 08:12

## 2025-05-14 RX ADMIN — MELATONIN TAB 3 MG 3 MG: 3 TAB at 20:18

## 2025-05-14 RX ADMIN — PROPRANOLOL HYDROCHLORIDE 120 MG: 60 CAPSULE, EXTENDED RELEASE ORAL at 08:13

## 2025-05-14 RX ADMIN — SENNOSIDES 17.2 MG: 8.6 TABLET, FILM COATED ORAL at 08:12

## 2025-05-14 RX ADMIN — NYSTATIN 500000 UNITS: 100000 SUSPENSION ORAL at 15:32

## 2025-05-14 RX ADMIN — ASPIRIN 81 MG: 81 TABLET, CHEWABLE ORAL at 20:18

## 2025-05-14 RX ADMIN — BUPROPION HYDROCHLORIDE 150 MG: 150 TABLET, EXTENDED RELEASE ORAL at 20:18

## 2025-05-14 RX ADMIN — LISINOPRIL 20 MG: 20 TABLET ORAL at 08:13

## 2025-05-14 RX ADMIN — FUROSEMIDE 20 MG: 20 TABLET ORAL at 08:13

## 2025-05-14 RX ADMIN — NYSTATIN 500000 UNITS: 100000 SUSPENSION ORAL at 20:18

## 2025-05-14 RX ADMIN — MELOXICAM 15 MG: 7.5 TABLET ORAL at 08:12

## 2025-05-14 ASSESSMENT — COGNITIVE AND FUNCTIONAL STATUS - GENERAL
DAILY ACTIVITIY SCORE: 21
TURNING FROM BACK TO SIDE WHILE IN FLAT BAD: A LITTLE
CLIMB 3 TO 5 STEPS WITH RAILING: A LOT
STANDING UP FROM CHAIR USING ARMS: A LITTLE
MOBILITY SCORE: 18
DRESSING REGULAR UPPER BODY CLOTHING: A LITTLE
WALKING IN HOSPITAL ROOM: A LITTLE
MOBILITY SCORE: 21
MOVING TO AND FROM BED TO CHAIR: A LITTLE
HELP NEEDED FOR BATHING: A LITTLE
DRESSING REGULAR LOWER BODY CLOTHING: A LITTLE
CLIMB 3 TO 5 STEPS WITH RAILING: A LITTLE
WALKING IN HOSPITAL ROOM: A LITTLE
MOVING TO AND FROM BED TO CHAIR: A LITTLE

## 2025-05-14 ASSESSMENT — PAIN - FUNCTIONAL ASSESSMENT
PAIN_FUNCTIONAL_ASSESSMENT: 0-10

## 2025-05-14 ASSESSMENT — ACTIVITIES OF DAILY LIVING (ADL): HOME_MANAGEMENT_TIME_ENTRY: 37

## 2025-05-14 ASSESSMENT — PAIN SCALES - GENERAL
PAINLEVEL_OUTOF10: 5 - MODERATE PAIN
PAINLEVEL_OUTOF10: 2
PAINLEVEL_OUTOF10: 0 - NO PAIN
PAINLEVEL_OUTOF10: 0 - NO PAIN

## 2025-05-14 NOTE — PROGRESS NOTES
Physical Therapy    Physical Therapy Treatment    Patient Name: Karina Lee  MRN: [id number]  Department: [facility]  Room: [address] [place] [address]  Today's Date: 5/14/2025  Time Calculation  Start Time: 0835  Stop Time: 0915  Time Calculation (min): 40 min         Assessment/Plan   PT Assessment  End of Session Communication: Bedside nurse, PCT/LEONARD/PRATIMA  Assessment Comment: Pt tolerated session well until dizziness began after cone activity. Pt needing increased rest break and had slower amb time back to room. Requesting to lay down untill dizziness subsides. Nursing notified.  End of Session Patient Position: Alarm on, Bed, 2 rail up  PT Plan  Inpatient/Swing Bed or Outpatient: Swing Bed  PT Plan  Treatment/Interventions: Transfer training, Gait training  PT Plan: Ongoing PT  PT Frequency: BID  PT Discharge Recommendations: Low intensity level of continued care  Equipment Recommended upon Discharge: Wheeled walker  PT Recommended Transfer Status: Assist x1  PT - OK to Discharge: Yes    PT Visit Info:  PT  Visit  PT Received On: 05/14/25  Response to Previous Treatment: Patient with no complaints from previous session.    General Visit Information:   General  Prior to Session Communication: PCT/NA/CTA  Patient Position Received: Up in chair, Alarm off, not on at start of session  Preferred Learning Style: verbal, written  General Comment: Pt pleasant and agreeable to participate. Pt cleared by nursing staff. Left with call bell within reach and needs addressed.    Subjective   Precautions:  Precautions  Medical Precautions: Fall precautions  Precautions Comment: R hip fx non operable     Date/Time Vitals Session Patient Position Pulse Resp SpO2 BP MAP (mmHg)    05/14/25 0924 --  --  --  --  93 %  --  --                 Objective   Pain:  Pain Assessment  Pain Assessment: 0-10  0-10 (Numeric) Pain Score: 5 - Moderate pain  Pain Type: Acute pain  Pain Location: Back  Pain Orientation: Mid  Patient's Stated Pain Goal: No pain  Pain  Interventions: Distraction (patch applied by nursing per pt report)  Cognition:  Cognition  Overall Cognitive Status: Within Functional Limits    Postural Control:  Static Standing Balance  Static Standing-Level of Assistance: Distant supervision  Static Standing-Comment/Number of Minutes: 1 trial, approx 10 sec, 1 UE support throughout    Treatments:  Therapeutic Activity  Therapeutic Activity 1: moving cones from low surface to high surface in lateral fashion, 1x8 ea side. Pt becoming dizzy during 2nd trial, terminating early.  Therapeutic Activity 2: amb in [place]/narrow spaces w FWW to mimic home set up, 1x approx 15 ft. slow to perform and slow w turns but no LOB.  Therapeutic Activity 3: lateral amb w FWW in narrow space to mimic bathroom setup at home. 10 ft x1 ea direction    Bed Mobility 1  Bed Mobility 1: Sitting to supine  Level of Assistance 1: Minimum assistance  Bed Mobility Comments 1: 1 trial, min A for RLE, some assistance needed for postioning,  but increased difficulty d/t dizziness    Ambulation/Gait Training 1  Surface 1: Level tile  Device 1: Rolling walker  Gait Support Devices: Gait belt  Assistance 1: Distant supervision  Quality of Gait 1: Wide base of support, Shuffling gait, Decreased step length  Comments/Distance (ft) 1: 3x50 ft and various other short distances. Did provide CGA on final trial d/t pt reporting dizziness to maximize safety, but all other trials supervision.  Transfer 1  Transfer From 1: Chair with arms to  Transfer to 1: Stand, Sit  Technique 1: Sit to stand, Stand to sit  Transfer Device 1: Walker, Gait belt  Transfer Level of Assistance 1: Modified independent  Trials/Comments 1: mult trials throughout session  Transfers 2  Transfer From 2: Stand to  Transfer to 2: Bed  Technique 2: Stand to sit  Transfer Device 2: Walker, Gait belt  Transfer Level of Assistance 2: Distant supervision  Trials/Comments 2: 1 trial    Stairs  Curb Step 1: Yes  Device 1: Wheeled  walker  Support Devices 1: Gait belt  Assistance 1: Distant supervision  Comment/Number of Steps 1: x2 trials of ascending/descending 2'' curb step to mimic entrance/exit to home.    Outcome Measures:  [facility] Basic Mobility  Turning from your back to your side while in a flat bed without using bedrails: None  Moving from lying on your back to sitting on the side of a flat bed without using bedrails: A little  Moving to and from bed to chair (including a wheelchair): A little  Standing up from a chair using your arms (e.g. wheelchair or bedside chair): A little  To walk in hospital room: A little  Climbing 3-5 steps with railing: A lot  Basic Mobility - Total Score: 18    Education Documentation  No documentation found.  Education Comments  No comments found.      Encounter Problems       Encounter Problems (Active)       PT Problem       pt will be able to ambulate 20 ft with FWW and modified independent  (Progressing)       Start:  05/05/25    Expected End:  05/26/25            Pt will be able to navigate 1 4 inch threshold with no hand rail and modified independent to allow for safe DC.   (Progressing)       Start:  05/05/25    Expected End:  05/26/25            Pt will be able to perform STS transfer with modified independent  (Met)       Start:  05/05/25    Expected End:  05/26/25    Resolved:  05/14/25         pt will be able to propel self home distances with manual WC and modified independent  (Met)       Start:  05/05/25    Expected End:  05/26/25    Resolved:  05/14/25         Pt will demonstrate ability to  object from ground from sitting position, without a device and mod I (Progressing)       Start:  05/05/25    Expected End:  05/26/25            Pt will be able to navigate in narrow as needed w FWW to allow for entrance into home bathroom. (Progressing)       Start:  05/05/25    Expected End:  05/26/25               Pain - Adult

## 2025-05-14 NOTE — CARE PLAN
"The clinical goals for the shift include Pt to participate in therapy and report decreased discomfort    Pt medication compliant, some crushed, others whole. Pt able to make wants and needs known verbally. Pt requested \"something for lower back pain\". New order for heat, applied with good effect. Pt participating in therapy. Ambulating with FWW and SBA with slow, steady gait. Pt is pleasant and cooperative. Pt did have an episode of lightheadedness during therapy in morning. Laid down and felt better, encouraging fluids with fair intake. Call bell within reach.  "

## 2025-05-14 NOTE — PROGRESS NOTES
Physical Therapy    Physical Therapy Treatment    Patient Name: Karina Lee  MRN: [id number]  Department: [facility]  Room: [address] [place] [address]  Today's Date: 5/14/2025  Time Calculation  Start Time: 1400  Stop Time: 1436  Time Calculation (min): 36 min    Assessment/Plan   PT Assessment  End of Session Communication: Bedside nurse  Assessment Comment: Mobility improving nicely as she approaches discharge.  End of Session Patient Position: Up in chair, Alarm on  PT Plan  Inpatient/Swing Bed or Outpatient: Swing Bed  PT Plan  Treatment/Interventions: Transfer training, Gait training  PT Plan: Ongoing PT  PT Frequency: BID  PT Discharge Recommendations: Low intensity level of continued care  Equipment Recommended upon Discharge: Wheeled walker  PT Recommended Transfer Status: Assist x1  PT - OK to Discharge: Yes    PT Visit Info:  PT Received On: 05/14/25     General Visit Information:   General  Patient Position Received: Up in chair, Alarm on  General Comment: Receptive3 to PT. Feeling much better.    Subjective   Precautions:  Precautions  Medical Precautions: Fall precautions    Objective   Pain:  Pain Assessment  0-10 (Numeric) Pain Score: 0 - No pain    Cognition:  Cognition  Overall Cognitive Status: Within Functional Limits     Postural Control:  Static Sitting Balance  Static Sitting-Level of Assistance: Modified independent  Dynamic Sitting Balance  Dynamic Sitting-Level of Assistance: Modified independent  Static Standing Balance  Static Standing-Level of Assistance: Modified independent  Dynamic Standing Balance  Dynamic Standing-Level of Assistance: Distant supervision     Treatments:  Therapeutic Exercise  Therapeutic Exercise Performed: Yes  Therapeutic Exercise Activity 1: LAQ 3x10  Therapeutic Exercise Activity 2: seated march 3x10  Therapeutic Exercise Activity 3: ankle rotation 3x10  Therapeutic Exercise Activity 4: chair push up 3x10    Bed Mobility 1  Bed Mobility 1: Supine to sitting, Sitting to supine  Level of  Assistance 1: Modified independent    Ambulation/Gait Training 1  Device 1: Rolling walker  Assistance 1: Distant supervision  Comments/Distance (ft) 1: 2x60 frt  Transfer 1  Technique 1: Sit to stand  Transfer Level of Assistance 1: Modified independent  Transfers 2  Technique 2: Stand pivot  Transfer Level of Assistance 2: Modified independent    Outcome Measures:  [facility] Basic Mobility  Turning from your back to your side while in a flat bed without using bedrails: None  Moving from lying on your back to sitting on the side of a flat bed without using bedrails: None  Moving to and from bed to chair (including a wheelchair): A little  Standing up from a chair using your arms (e.g. wheelchair or bedside chair): None  To walk in hospital room: A little  Climbing 3-5 steps with railing: A little  Basic Mobility - Total Score: 21    Education Documentation  No documentation found.  Education Comments  No comments found.        OP EDUCATION:       Encounter Problems       Encounter Problems (Active)       PT Problem       pt will be able to ambulate 20 ft with FWW and modified independent  (Progressing)       Start:  05/05/25    Expected End:  05/26/25            Pt will be able to navigate 1 4 inch threshold with no hand rail and modified independent to allow for safe DC.   (Progressing)       Start:  05/05/25    Expected End:  05/26/25            Pt will be able to perform STS transfer with modified independent  (Met)       Start:  05/05/25    Expected End:  05/26/25    Resolved:  05/14/25         pt will be able to propel self home distances with manual WC and modified independent  (Met)       Start:  05/05/25    Expected End:  05/26/25    Resolved:  05/14/25         Pt will demonstrate ability to  object from ground from sitting position, without a device and mod I (Progressing)       Start:  05/05/25    Expected End:  05/26/25            Pt will be able to navigate in narrow as needed w FWW to allow for  entrance into home bathroom. (Progressing)       Start:  05/05/25    Expected End:  05/26/25               Pain - Adult

## 2025-05-14 NOTE — PROGRESS NOTES
Karina Lee is a 84 y.o. female on day 10 of admission presenting with Closed right hip fracture, sequela.      Subjective   Karina is seen in her room in her chair. Is having significant lower back pain       Objective     Last Recorded Vitals  /73 (BP Location: Right arm, Patient Position: Sitting)   Pulse 53   Temp 35.9 °C (96.7 °F) (Temporal)   Resp 16   Wt 89.4 kg (197 lb)   SpO2 93%   Intake/Output last 3 Shifts:    Intake/Output Summary (Last 24 hours) at 5/14/2025 1303  Last data filed at 5/14/2025 1229  Gross per 24 hour   Intake 480 ml   Output --   Net 480 ml       Admission Weight  Weight: 89.4 kg (197 lb) (05/04/25 1306)    Daily Weight  05/04/25 : 89.4 kg (197 lb)    Image Results  XR chest 1 view  Narrative: Interpreted By:  Jacobo Rivas,   STUDY:  Chest dated  5/11/2025.      INDICATION:  Signs/Symptoms:dry cough      COMPARISON:  Chest dated 02/23/2023.      ACCESSION NUMBER(S):  [id number]      ORDERING CLINICIAN:  JAYDON CURRY      TECHNIQUE:  One view of the chest.      FINDINGS:  The lungs are clear.  No pneumothorax or effusion is evident. The  cardiomediastinal silhouette is  not enlarged.Degenerative change is  seen of the spine and shoulders.      Impression: No acute cardiopulmonary process is evident.      MACRO:  None      Signed by: Jacobo Rivas 5/11/2025 10:32 AM  Dictation workstation:   [id number]    Results for orders placed or performed during the hospital encounter of 05/04/25 (from the past 96 hours)   Group A Streptococcus, Culture    Specimen: Throat/Pharynx; Swab   Result Value Ref Range    Group A Strep Screen, Culture No Group A Streptococcus (GAS) isolated    Group A Streptococcus, PCR    Specimen: Throat/Pharynx; Swab   Result Value Ref Range    Group A Strep PCR Not Detected Not Detected   Urinalysis with Reflex Culture and Microscopic   Result Value Ref Range    Color, Urine Yellow Straw, Yellow    Appearance, Urine Hazy (N) Clear    Specific  Gravity, Urine 1.008 1.005 - 1.035    pH, Urine 7.0 5.0, 5.5, 6.0, 6.5, 7.0, 7.5, 8.0    Protein, Urine NEGATIVE NEGATIVE mg/dL    Glucose, Urine NEGATIVE NEGATIVE mg/dL    Blood, Urine NEGATIVE NEGATIVE mg/dL    Ketones, Urine NEGATIVE NEGATIVE mg/dL    Bilirubin, Urine NEGATIVE NEGATIVE mg/dL    Urobilinogen, Urine <2.0 <2.0 mg/dL    Nitrite, Urine NEGATIVE NEGATIVE    Leukocyte Esterase, Urine LARGE (3+) (A) NEGATIVE   Microscopic Only, Urine   Result Value Ref Range    WBC, Urine 21-50 (A) 1-5, NONE /HPF    WBC Clumps, Urine FEW Reference range not established. /HPF    RBC, Urine NONE NONE, 1-2, 3-5 /HPF    Squamous Epithelial Cells, Urine 1-9 (SPARSE) Reference range not established. /HPF    Transitional Epithelial Cells, Urine 1-2 (FEW) Reference range not established. /HPF    Bacteria, Urine 3+ (A) NONE SEEN /HPF   Urine Culture    Specimen: Clean Catch/Voided; Urine   Result Value Ref Range    Urine Culture 20,000 - 80,000 CFU/mL Escherichia coli (A)        Susceptibility    Escherichia coli - MICROSCAN     Ampicillin  Resistant ug/ml     Amoxicillin/Clavulanate  Resistant ug/ml     Ampicillin/Sulbactam  Resistant ug/ml     Cefazolin  Susceptible ug/ml     Cefazolin (uncomplicated UTIs only)  Susceptible ug/ml     Piperacillin/Tazobactam  Susceptible ug/ml     Nitrofurantoin  Susceptible ug/ml     Trimethoprim/Sulfamethoxazole  Resistant ug/ml     Ciprofloxacin  Intermediate ug/ml     Levofloxacin  Intermediate ug/ml     Gentamicin  Susceptible ug/ml   CBC   Result Value Ref Range    WBC 4.8 4.4 - 11.3 x10*3/uL    nRBC 0.0 0.0 - 0.0 /100 WBCs    RBC 3.81 (L) 4.00 - 5.20 x10*6/uL    Hemoglobin 12.3 12.0 - 16.0 g/dL    Hematocrit 38.8 36.0 - 46.0 %     (H) 80 - 100 fL    MCH 32.3 26.0 - 34.0 pg    MCHC 31.7 (L) 32.0 - 36.0 g/dL    RDW 13.1 11.5 - 14.5 %    Platelets 198 150 - 450 x10*3/uL   Basic Metabolic Panel   Result Value Ref Range    Glucose 139 (H) 74 - 99 mg/dL    Sodium 133 (L) 136 - 145 mmol/L     Potassium 4.5 3.5 - 5.3 mmol/L    Chloride 103 98 - 107 mmol/L    Bicarbonate 26 21 - 32 mmol/L    Anion Gap 9 (L) 10 - 20 mmol/L    Urea Nitrogen 29 (H) 6 - 23 mg/dL    Creatinine 1.58 (H) 0.50 - 1.05 mg/dL    eGFR 32 (L) >60 mL/min/1.73m*2    Calcium 9.3 8.6 - 10.3 mg/dL    Susceptibility data from last 90 days.  Collected Specimen Info Organism Amoxicillin/Clavulanate Ampicillin Ampicillin/Sulbactam Cefazolin Cefazolin (uncomplicated UTIs only) Ciprofloxacin Gentamicin Levofloxacin Nitrofurantoin Piperacillin/Tazobactam   05/12/25 Urine from Clean Catch/Voided Escherichia coli  R  R  R  S  S  I  S  I  S  S     Collected Specimen Info Organism Trimethoprim/Sulfamethoxazole   05/12/25 Urine from Clean Catch/Voided Escherichia coli  R        Physical Exam  Vitals reviewed.   Constitutional:       Appearance: Normal appearance. She is obese.   HENT:      Head: Normocephalic and atraumatic.      Right Ear: External ear normal.      Left Ear: External ear normal.      Nose: clear     Mouth/Throat: clear     Mouth: Mucous membranes are moist.      Pharynx: Oropharynx is clear.   Eyes:      Conjunctiva/sclera: Conjunctivae normal.      Pupils: Pupils are equal, round, and reactive to light.   Cardiovascular:      Rate and Rhythm: Normal rate and regular rhythm.      Pulses: Normal pulses.      Heart sounds: Normal heart sounds.   Pulmonary:      Effort: Pulmonary effort is normal.      Breath sounds: Normal breath sounds.   Abdominal:      General: Bowel sounds are normal.      Palpations: Abdomen is soft.   Musculoskeletal:         General: Swelling and tenderness present. Right hip.     Cervical back: Normal range of motion and neck supple.      Comments: Limited ROM to right leg               ~brace right knee area. Multiple dressings noted to right knee with generalized edema  Skin:     General: Skin is warm and dry.      Findings: Bruising present.   Neurological:      General: No focal deficit present.       Mental Status: She is alert and oriented to person, place, and time.   Psychiatric:         Mood and Affect: Mood normal.         Behavior: Behavior normal.     Scheduled medications  Scheduled Medications[1]  Continuous medications  Continuous Medications[2]  PRN medications  PRN Medications[3]  Assessment & Plan  Closed right hip fracture, sequela    ASHD (arteriosclerotic heart disease)    Constipation    Stage 3a chronic kidney disease (Multi)    Anxiety and depression    Benign essential HTN    Dyslipidemia    Thrombocytopenia    Acute retention of urine    Normocytic anemia    Acute non-recurrent maxillary sinusitis      Right Greater trochanter of femur fracture  Fall at home  - PT/OT evaluation  - WBAT  - Follow up with orthopedic outpatient, non surgical  - started acetaminophen 975 mg daily  - stopped toradol  - oxycodone 5 - 10 mg q6h, prn  Low back pain  ~add heating pad  ~add voltaren and DC meloxicam 2/2 renal failure    UTI  ~20-80 e. Coli with sensitivities above.   ~fosfomycin 3 GM X 1 dose given     Syncope and collapse-resolved  ASHD  Dyslipidemia  Essential HTN  - troponin 12 > 11  - continue furosemide 20 mg daily, aspirin 81 mg daily, atorvastatin 20 mg daily  - lisinopril 20 mg daily  - monitor BP     Chronic kidney failure, Stage 3a  Urine retention  - baseline creatine 1.18  - monitor renal function  - renally dose medication  - bladder scan PRN        -creatinine 1.58 today  ~DC meloxicam           Thrombocytopenia  Normocytic anemia  -   - Hb 11.3;   - monitor CBC     Anxiety and depression  - continue bupropion 150 mg daily, escitalopram 20 mg daily, propranolol 120 mg daily     DVT ppx  - enoxaparin 40 mg daily     Disposition: Admitted 2/2 s/p right distal femur repair and need for inpatient physical therapy     I spent 15 minutes in the professional and overall care of this patient.       Kerri Robin, APRN-CNP           [1] acetaminophen, 975 mg, oral, q8h  aspirin, 81  mg, oral, Daily  atorvastatin, 20 mg, oral, Daily  buPROPion XL, 150 mg, oral, Daily  enoxaparin, 40 mg, subcutaneous, Daily  escitalopram, 20 mg, oral, Daily  furosemide, 20 mg, oral, Daily  lidocaine, 1 patch, transdermal, Daily  lisinopril, 20 mg, oral, Daily  melatonin, 3 mg, oral, Nightly  nystatin, 5 mL, Swish & Swallow, TID  propranolol LA, 120 mg, oral, Daily  sennosides, 2 tablet, oral, Daily  [2]    [3] PRN medications: benzocaine-menthol, dextromethorphan-guaifenesin, diclofenac sodium, lidocaine, magnesium hydroxide, ondansetron ODT, oxyCODONE

## 2025-05-14 NOTE — PROGRESS NOTES
05/14/25 1842   Discharge Planning   Living Arrangements Children   Support Systems Children   Assistance Needed Pt will be given [employer] tomorrow during the weekly meeting. Pt was informed today abiout meeting her goals and going home this week. Pt reports that she knows about the meeting.   Type of Residence Private residence   Who is requesting discharge planning? Patient   Home or Post Acute Services In home services   Type of Home Care Services Home OT;Home PT   Expected Discharge Disposition Home H   Patient Choice   Provider Choice list and CMS website (https://medicare.gov/care-compare#search) for post-acute Quality and Resource Measure Data were provided and reviewed with: Patient   Patient / Family choosing to utilize agency / facility established prior to hospitalization No   Stroke Family Assessment   Stroke Family Assessment Needed No   Intensity of Service   Intensity of Service 0-30 min     JENNIFER OLIVAS

## 2025-05-14 NOTE — CARE PLAN
The patient's goals for the shift include      The clinical goals for the shift include Patient will get a restful nights sleep.    Over the shift, the patient did  make progress toward the following goals. Patient did get a restful nights sleep.

## 2025-05-15 PROCEDURE — 94760 N-INVAS EAR/PLS OXIMETRY 1: CPT

## 2025-05-15 PROCEDURE — 97116 GAIT TRAINING THERAPY: CPT | Mod: GP,CQ

## 2025-05-15 PROCEDURE — 2500000005 HC RX 250 GENERAL PHARMACY W/O HCPCS: Performed by: INTERNAL MEDICINE

## 2025-05-15 PROCEDURE — 97530 THERAPEUTIC ACTIVITIES: CPT | Mod: GP,CQ

## 2025-05-15 PROCEDURE — 2500000004 HC RX 250 GENERAL PHARMACY W/ HCPCS (ALT 636 FOR OP/ED): Mod: JZ | Performed by: NURSE PRACTITIONER

## 2025-05-15 PROCEDURE — 2500000005 HC RX 250 GENERAL PHARMACY W/O HCPCS: Performed by: NURSE PRACTITIONER

## 2025-05-15 PROCEDURE — 2500000002 HC RX 250 W HCPCS SELF ADMINISTERED DRUGS (ALT 637 FOR MEDICARE OP, ALT 636 FOR OP/ED): Performed by: NURSE PRACTITIONER

## 2025-05-15 PROCEDURE — 2500000001 HC RX 250 WO HCPCS SELF ADMINISTERED DRUGS (ALT 637 FOR MEDICARE OP): Performed by: NURSE PRACTITIONER

## 2025-05-15 PROCEDURE — 97535 SELF CARE MNGMENT TRAINING: CPT | Mod: GO

## 2025-05-15 PROCEDURE — 1900000001 HC SKILLED SWING ROOM

## 2025-05-15 PROCEDURE — 2500000001 HC RX 250 WO HCPCS SELF ADMINISTERED DRUGS (ALT 637 FOR MEDICARE OP): Performed by: INTERNAL MEDICINE

## 2025-05-15 RX ADMIN — ESCITALOPRAM OXALATE 20 MG: 10 TABLET, FILM COATED ORAL at 21:12

## 2025-05-15 RX ADMIN — FUROSEMIDE 20 MG: 20 TABLET ORAL at 08:52

## 2025-05-15 RX ADMIN — PROPRANOLOL HYDROCHLORIDE 120 MG: 60 CAPSULE, EXTENDED RELEASE ORAL at 08:52

## 2025-05-15 RX ADMIN — ATORVASTATIN CALCIUM 20 MG: 10 TABLET, FILM COATED ORAL at 21:12

## 2025-05-15 RX ADMIN — ENOXAPARIN SODIUM 40 MG: 40 INJECTION SUBCUTANEOUS at 08:52

## 2025-05-15 RX ADMIN — ASPIRIN 81 MG: 81 TABLET, CHEWABLE ORAL at 21:12

## 2025-05-15 RX ADMIN — MELATONIN TAB 3 MG 3 MG: 3 TAB at 21:12

## 2025-05-15 RX ADMIN — ACETAMINOPHEN 975 MG: 325 TABLET ORAL at 15:25

## 2025-05-15 RX ADMIN — NYSTATIN 500000 UNITS: 100000 SUSPENSION ORAL at 08:52

## 2025-05-15 RX ADMIN — ACETAMINOPHEN 975 MG: 325 TABLET ORAL at 00:03

## 2025-05-15 RX ADMIN — LISINOPRIL 20 MG: 20 TABLET ORAL at 08:52

## 2025-05-15 RX ADMIN — LIDOCAINE 4% 1 PATCH: 40 PATCH TOPICAL at 08:52

## 2025-05-15 RX ADMIN — BUPROPION HYDROCHLORIDE 150 MG: 150 TABLET, EXTENDED RELEASE ORAL at 21:12

## 2025-05-15 RX ADMIN — ACETAMINOPHEN 975 MG: 325 TABLET ORAL at 08:52

## 2025-05-15 RX ADMIN — SENNOSIDES 17.2 MG: 8.6 TABLET, FILM COATED ORAL at 08:52

## 2025-05-15 RX ADMIN — NYSTATIN 500000 UNITS: 100000 SUSPENSION ORAL at 15:25

## 2025-05-15 ASSESSMENT — COGNITIVE AND FUNCTIONAL STATUS - GENERAL
MOBILITY SCORE: 20
MOVING TO AND FROM BED TO CHAIR: A LITTLE
TOILETING: A LITTLE
DRESSING REGULAR UPPER BODY CLOTHING: A LITTLE
DAILY ACTIVITIY SCORE: 24
DRESSING REGULAR LOWER BODY CLOTHING: A LITTLE
MOBILITY SCORE: 21
WALKING IN HOSPITAL ROOM: A LITTLE
MOVING TO AND FROM BED TO CHAIR: A LITTLE
CLIMB 3 TO 5 STEPS WITH RAILING: A LOT
HELP NEEDED FOR BATHING: A LITTLE
PERSONAL GROOMING: A LITTLE
DAILY ACTIVITIY SCORE: 19
WALKING IN HOSPITAL ROOM: A LITTLE
CLIMB 3 TO 5 STEPS WITH RAILING: A LITTLE

## 2025-05-15 ASSESSMENT — ACTIVITIES OF DAILY LIVING (ADL)
BATHING_LEVEL_OF_ASSISTANCE: MODIFIED INDEPENDENT
BATHING_WHERE_ASSESSED: SITTING SINKSIDE;STANDING SINKSIDE
HOME_MANAGEMENT_TIME_ENTRY: 43

## 2025-05-15 ASSESSMENT — PAIN - FUNCTIONAL ASSESSMENT
PAIN_FUNCTIONAL_ASSESSMENT: 0-10

## 2025-05-15 ASSESSMENT — PAIN SCALES - GENERAL
PAINLEVEL_OUTOF10: 3
PAINLEVEL_OUTOF10: 7
PAINLEVEL_OUTOF10: 5 - MODERATE PAIN

## 2025-05-15 ASSESSMENT — PAIN DESCRIPTION - DESCRIPTORS
DESCRIPTORS: ACHING
DESCRIPTORS: ACHING
DESCRIPTORS: SORE

## 2025-05-15 NOTE — PROGRESS NOTES
Physical Therapy    Physical Therapy Treatment    Patient Name: Karina Lee  MRN: [id number]  Department: [facility]  Room: [address] [place] [address]  Today's Date: 5/15/2025  Time Calculation  Start Time: 1356  Stop Time: 1426  Time Calculation (min): 30 min    Assessment/Plan   PT Assessment  Rehab Prognosis: Fair  Barriers to Discharge Home: Physical needs  Caregiver Assistance: Patient lives alone and/or does not have reliable caregiver assistance  Physical Needs: Ambulating household distances limited by function/safety, Intermittent mobility assistance needed, High falls risk due to function or environment  End of Session Communication: PCT/NA/CTA, Bedside nurse  Assessment Comment: Mobility improving nicely as she approaches discharge.  End of Session Patient Position: Alarm on, Bed, 2 rail up  PT Plan  Inpatient/Swing Bed or Outpatient: Swing Bed  PT Plan  Treatment/Interventions: Transfer training, Gait training  PT Plan: Ongoing PT  PT Frequency: BID  PT Discharge Recommendations: Low intensity level of continued care  Equipment Recommended upon Discharge: Wheeled walker  PT Recommended Transfer Status: Assist x1  PT - OK to Discharge: Yes    PT Visit Info:  PT Received On: 05/15/25  Response to Previous Treatment: Patient with no complaints from previous session.     General Visit Information:   General  Reason for Referral: Decrease mobility  Referred By: WILLA Fitzpatrick-CNP  Past Medical History Relevant to Rehab: PMH: Acute URI, osteoporosis, anxiety, OA, CVA, GERD, HTN  Prior to Session Communication: PCT/LEONARD/PRATIMA, Bedside nurse  Patient Position Received: Up in chair, Alarm on  Preferred Learning Style: verbal, written    Subjective   Precautions:  Precautions  Medical Precautions: Fall precautions    Objective   Pain:  Pain Assessment  Pain Assessment: 0-10  0-10 (Numeric) Pain Score: 5 - Moderate pain  Pain Type: Acute pain  Pain Location: Back  Pain Orientation: Lower  Pain Descriptors: Aching  Pain  Interventions: Food, Heat applied, Distraction, Rest  Cognition:  Cognition  Overall Cognitive Status: Within Functional Limits  Coordination:  Movements are Fluid and Coordinated: Yes  Postural Control:  Postural Control  Postural Control: Within Functional Limits  Static Sitting Balance  Static Sitting-Level of Assistance: Modified independent  Dynamic Sitting Balance  Dynamic Sitting-Level of Assistance: Modified independent  Static Standing Balance  Static Standing-Level of Assistance: Modified independent  Dynamic Standing Balance  Dynamic Standing-Level of Assistance: Distant supervision  Activity Tolerance:  Activity Tolerance  Endurance: Tolerates 30 min exercise with multiple rests  Treatments:    Therapeutic Activity  Therapeutic Activity Performed: Yes  Therapeutic Activity 1: Patient side stepping with walker around cones and weights in line on floor to simulate how patient will need to enter bathroom at home    Ambulation/Gait Training  Ambulation/Gait Training Performed: Yes  Ambulation/Gait Training 1  Surface 1: Level tile  Device 1: Rolling walker  Gait Support Devices: Gait belt  Assistance 1: Distant supervision  Quality of Gait 1: Wide base of support, Shuffling gait, Decreased step length  Comments/Distance (ft) 1: 200 ft x 2 with additional time required  Transfers  Transfer: Yes  Transfer 1  Transfer From 1: Sit to  Transfer to 1: Stand  Technique 1: Sit to stand  Transfer Device 1: Walker, Gait belt  Transfer Level of Assistance 1: Modified independent    Outcome Measures:  [facility] Basic Mobility  Turning from your back to your side while in a flat bed without using bedrails: None  Moving from lying on your back to sitting on the side of a flat bed without using bedrails: None  Moving to and from bed to chair (including a wheelchair): A little  Standing up from a chair using your arms (e.g. wheelchair or bedside chair): None  To walk in hospital room: A little  Climbing 3-5 steps with railing:  A little  Basic Mobility - Total Score: 21    Encounter Problems       Encounter Problems (Active)       PT Problem       pt will be able to ambulate 20 ft with FWW and modified independent  (Progressing)       Start:  05/05/25    Expected End:  05/26/25            Pt will be able to navigate 1 4 inch threshold with no hand rail and modified independent to allow for safe DC.   (Progressing)       Start:  05/05/25    Expected End:  05/26/25            Pt will be able to perform STS transfer with modified independent  (Met)       Start:  05/05/25    Expected End:  05/26/25    Resolved:  05/14/25         pt will be able to propel self home distances with manual WC and modified independent  (Met)       Start:  05/05/25    Expected End:  05/26/25    Resolved:  05/14/25         Pt will demonstrate ability to  object from ground from sitting position, without a device and mod I (Progressing)       Start:  05/05/25    Expected End:  05/26/25            Pt will be able to navigate in narrow as needed w FWW to allow for entrance into home bathroom. (Progressing)       Start:  05/05/25    Expected End:  05/26/25               Pain - Adult

## 2025-05-15 NOTE — PROGRESS NOTES
Physical Therapy    Physical Therapy Treatment    Patient Name: Karina Lee  MRN: [id number]  Department: [facility]  Room: [address] [place] [address]  Today's Date: 5/15/2025  Time Calculation  Start Time: 0949  Stop Time: 1016  Time Calculation (min): 27 min    Assessment/Plan   PT Assessment  Rehab Prognosis: Fair  Barriers to Discharge Home: Physical needs  Caregiver Assistance: Patient lives alone and/or does not have reliable caregiver assistance  Physical Needs: Ambulating household distances limited by function/safety, Intermittent mobility assistance needed, High falls risk due to function or environment  End of Session Communication: PCT/LEONARD/PRATIMA, Bedside nurse  Assessment Comment: Mobility improving nicely as she approaches discharge.  End of Session Patient Position: Up in chair, Alarm on  PT Plan  Inpatient/Swing Bed or Outpatient: Swing Bed  PT Plan  Treatment/Interventions: Transfer training, Gait training  PT Plan: Ongoing PT  PT Frequency: BID  PT Discharge Recommendations: Low intensity level of continued care  Equipment Recommended upon Discharge: Wheeled walker  PT Recommended Transfer Status: Assist x1  PT - OK to Discharge: Yes    PT Visit Info:  PT Received On: 05/15/25  Response to Previous Treatment: Patient with no complaints from previous session.     General Visit Information:   General  Reason for Referral: Decrease mobility  Referred By: WILLA Fitzpatrick-CNP  Past Medical History Relevant to Rehab: PMH: Acute URI, osteoporosis, anxiety, OA, CVA, GERD, HTN  Prior to Session Communication: PCT/LEONARD/PRATIMA, Bedside nurse  Patient Position Received: Up in chair, Alarm on  Preferred Learning Style: verbal, written    Subjective   Precautions:  Precautions  Medical Precautions: Fall precautions     Date/Time Vitals Session Patient Position Pulse Resp SpO2 BP MAP (mmHg)    05/15/25 0950 --  --  --  --  96 %  --  --           Objective   Pain:  Pain Assessment  Pain Assessment: 0-10  0-10 (Numeric) Pain Score:  7  Pain Type: Acute pain  Pain Location: Back  Pain Orientation: Lower  Pain Descriptors: Aching  Pain Interventions: Heat applied, Distraction, Rest  Cognition:     Coordination:  Movements are Fluid and Coordinated: Yes  Postural Control:  Postural Control  Postural Control: Within Functional Limits  Static Sitting Balance  Static Sitting-Level of Assistance: Modified independent  Dynamic Sitting Balance  Dynamic Sitting-Level of Assistance: Modified independent  Static Standing Balance  Static Standing-Level of Assistance: Modified independent  Dynamic Standing Balance  Dynamic Standing-Level of Assistance: Distant supervision    Activity Tolerance:  Activity Tolerance  Endurance: Tolerates 30 min exercise with multiple rests  Treatments:    Therapeutic Activity  Therapeutic Activity Performed: Yes  Therapeutic Activity 1: Patient side stepping with walker around cones and weights in line on floor to simulate how patient will need to enter bathroom at home    Ambulation/Gait Training  Ambulation/Gait Training Performed: Yes  Ambulation/Gait Training 1  Surface 1: Level tile  Device 1: Rolling walker  Gait Support Devices: Gait belt  Assistance 1: Distant supervision  Quality of Gait 1: Wide base of support, Shuffling gait, Decreased step length  Comments/Distance (ft) 1: 2x50 ft  Transfer 1  Transfer From 1: Sit to  Transfer to 1: Stand  Technique 1: Sit to stand  Transfer Device 1: Walker, Gait belt  Transfer Level of Assistance 1: Modified independent    Outcome Measures:  [facility] Basic Mobility  Turning from your back to your side while in a flat bed without using bedrails: None  Moving from lying on your back to sitting on the side of a flat bed without using bedrails: None  Moving to and from bed to chair (including a wheelchair): A little  Standing up from a chair using your arms (e.g. wheelchair or bedside chair): None  To walk in hospital room: A little  Climbing 3-5 steps with railing: A little  Basic  Mobility - Total Score: 21    Encounter Problems       Encounter Problems (Active)       PT Problem       pt will be able to ambulate 20 ft with FWW and modified independent  (Progressing)       Start:  05/05/25    Expected End:  05/26/25            Pt will be able to navigate 1 4 inch threshold with no hand rail and modified independent to allow for safe DC.   (Progressing)       Start:  05/05/25    Expected End:  05/26/25            Pt will be able to perform STS transfer with modified independent  (Met)       Start:  05/05/25    Expected End:  05/26/25    Resolved:  05/14/25         pt will be able to propel self home distances with manual WC and modified independent  (Met)       Start:  05/05/25    Expected End:  05/26/25    Resolved:  05/14/25         Pt will demonstrate ability to  object from ground from sitting position, without a device and mod I (Progressing)       Start:  05/05/25    Expected End:  05/26/25            Pt will be able to navigate in narrow as needed w FWW to allow for entrance into home bathroom. (Progressing)       Start:  05/05/25    Expected End:  05/26/25               Pain - Adult

## 2025-05-15 NOTE — PROGRESS NOTES
Occupational Therapy    Occupational Therapy Treatment    Name: Karina Lee  MRN: [id number]  Department: [facility]  Room: [address] [place] [address]  Date: 05/14/25  Time Calculation  Start Time: 1325  Stop Time: 1402  Time Calculation (min): 37 min    Assessment:  OT Assessment: RN cleared pt. At this date, great tolerance to session with no pain sensations limiting participation. Pt demonstrates good progress towards all goals with ADL goals met at this date including- toileting and grooming. Partial met goal for UB/LB wash- UB met, progressing LB. Transfer goal additionally met. Pt would benefit from continued OT services to address remaining deficits to increase ease/independene within daily routine and improve safety prior to d/c home. Although toileting ADL met, pt would benefit from continued practice of toilet transfer requiring side steps simulating at home set-up. Pt needs met, call light in reach. Transfer of care over to PT services. RN notified.  Prognosis: Good  Barriers to Discharge Home: Caregiver assistance, Physical needs  Caregiver Assistance: Patient lives alone and/or does not have reliable caregiver assistance  Physical Needs: Ambulating household distances limited by function/safety, 24hr mobility assistance needed, Intermittent ADL assistance needed, High falls risk due to function or environment  Evaluation/Treatment Tolerance: Patient tolerated treatment well  Medical Staff Made Aware: Yes  End of Session Communication: Bedside nurse, PCT/NA/CTA  End of Session Patient Position: Up in chair, Alarm on    Plan:  Treatment Interventions: ADL retraining, Functional transfer training, UE strengthening/ROM, Endurance training, Neuromuscular reeducation, Fine motor coordination activities, Compensatory technique education  OT Frequency: 5 times per week  OT Discharge Recommendations: Low intensity level of continued care  Equipment Recommended upon Discharge: Wheeled walker  OT Recommended Transfer Status: Assist of  1  OT - OK to Discharge: Yes    Subjective   OT Visit Info:  OT Received On: 05/14/25  General:  General  Reason for Referral: impaired ADLs  Referred By: MINISTERIO Fitzpatrick  Past Medical History Relevant to Rehab: PMH: Acute URI, osteoporosis, anxiety, OA, CVA, GERD, HTN  Family/Caregiver Present: No  Prior to Session Communication: PCT/NA/CTA  Patient Position Received: Up in chair, Alarm on  Preferred Learning Style: verbal, written  General Comment: Pt seated in recliner, agreeable to work with therapy. Only wanting to wash UB and complete grooming at this time. Requesting toilet first. Denies nausea/dizzy s/s at this time- feeling much better from this morning.    Precautions:  Hearing/Visual Limitations: Wear hearing aid/glasses  LE Weight Bearing Status: Weight Bearing as Tolerated  Medical Precautions: Fall precautions  Precautions Comment: R hip fx non operable    Pain Assessment:  Pain Assessment  Pain Assessment: 0-10  0-10 (Numeric) Pain Score: 0 - No pain    Objective   Cognition:  Overall Cognitive Status: Within Functional Limits  Orientation Level: Oriented X4    Activities of Daily Living:   Grooming  Grooming Level of Assistance: Modified independent  Grooming Where Assessed: Sitting sinkside  Grooming Comments: Entire grooming routine completed at sinkside from seated position. Pt able to manage drawers/items on sink to complete desired grooming. Observed baseline. Goal met.    UE Bathing  UE Bathing Level of Assistance: Modified independent  UE Bathing Where Assessed: Sitting sinkside  UE Bathing Comments: Entire UB bathing via sponge bath seated at sink completed. Pt able to manage sink/water bin to complete total UB wash. Pt not willing to assess from shower-seated d/t comfort. Observed baseline, Goal met.    UE Dressing  UE Dressing Level of Assistance: Distant supervision (SBA)  UE Dressing Where Assessed: Other (Comment) (sitting sinkside)  UE Dressing Comments: SBA for fasteners,  remainder doff/maya of hospital gown completed by pt.    Toileting  Toileting Level of Assistance: Modified independent  Where Assessed: Toilet  Toileting Comments: Pt completing clothing management, pericare, transfer on/off within bathroom door closed. Therapist posted outside door, telling pt to knock prior to standing however pt did not. Observed prior sessions, goal met at this time.    Bed Mobility/Transfers:   Transfers  Transfer: Yes  Transfer 1  Transfer From 1: Sit to  Transfer to 1: Stand  Technique 1: Sit to stand, Stand to sit  Transfer Device 1: Walker, Gait belt  Transfer Level of Assistance 1: Modified independent  Trials/Comments 1: Pt demonstrates increased ease with observed 'popping up from chair' prior to cueing, alarm set off. Pt realized and immediately sat back down, apologetic. Mult trials throughout with consistent [place].    Toilet Transfers  Toilet Transfer From: Walker  Toilet Transfer Type: To and from  Toilet Transfer to: Raised toilet seat with rails  Toilet Transfer Technique: Ambulating  Toilet Transfers: Modified independence    Functional Mobility:  Functional Mobility  Functional Mobility Performed: Yes  Functional Mobility 1  Surface 1: Level tile  Device 1: Rolling walker  Functional Mobility Support Devices: Gait belt  Assistance 1: Close supervision  Comments 1: From recliner to transition to sink, 1 trial.    Sitting Balance:  Static Sitting Balance  Static Sitting-Level of Assistance: Modified Independent  Dynamic Sitting Balance  Dynamic Sitting-Level of Assistance: Distant supervision (UB ADLs at sink)    Standing Balance:  Static Standing Balance  Static Standing-Level of Assistance: Close supervision  Dynamic Standing Balance  Dynamic Standing-Level of Assistance: Close supervision    Outcome Measures:  [facility] Daily Activity  Putting on and taking off regular lower body clothing: A little  Bathing (including washing, rinsing, drying): A little  Putting on and taking off  regular upper body clothing: A little  Toileting, which includes using toilet, bedpan or urinal: None  Taking care of personal grooming such as brushing teeth: None  Eating Meals: None  Daily Activity - Total Score: 21    Education Documentation  No documentation found.  Education Comments  No comments found.      Goals:  Encounter Problems       Encounter Problems (Active)       ADLs       Patient will perform UB and LB bathing with modified independent level of assistance and grab bars, shower chair, and long-handled sponge. (Progressing)       Start:  05/05/25    Expected End:  05/19/25         Goal Note       UB bathing sinkside modified independence 5/14- goal partially met. Need to re-assess current LB bathing sinkside. Pt denies completing bathing in facility showers, not comfortable and wants to wait until she gets home.               Patient with complete lower body dressing with modified independent level of assistance donning and doffing all LE clothes  with PRN adaptive equipment while supported sitting (Progressing)       Start:  05/05/25    Expected End:  05/19/25            Patient will complete daily grooming tasks with modified independent level of assistance and PRN adaptive equipment while supported sitting and standing. (Met)       Start:  05/05/25    Expected End:  05/19/25    Resolved:  05/14/25         Patient will complete toileting including hygiene clothing management/hygiene with modified independent level of assistance and raised toilet seat and grab bars. (Met)       Start:  05/05/25    Expected End:  05/19/25    Resolved:  05/14/25            BALANCE       Pt will maintain dynamic standing balance during ADL task with modified independent level of assistance in order to demonstrate decreased risk of falling and improved postural control. (Progressing)       Start:  05/05/25    Expected End:  05/19/25               MOBILITY             Encounter Problems (Resolved)       TRANSFERS        Patient will complete sit to stand transfer with modified independent level of assistance and least restrictive device in order to improve safety and prepare for out of bed mobility. (Met)       Start:  05/05/25    Expected End:  05/19/25    Resolved:  05/14/25

## 2025-05-15 NOTE — PROGRESS NOTES
Occupational Therapy    Occupational Therapy Treatment    Name: Karina Lee  MRN: [id number]  Department: [facility]  Room: [address] [place] [address]  Date: 05/15/25  Time Calculation  Start Time: 0849  Stop Time: 0932  Time Calculation (min): 43 min    Assessment:  OT Assessment: Patient paticipating in all ADLs this date with ease. Patient prepared to return home and complete ADLs. Will continue to work on remaining goals until discharge.  Prognosis: Good  Barriers to Discharge Home: Caregiver assistance, Physical needs  Caregiver Assistance: Patient lives alone and/or does not have reliable caregiver assistance  Physical Needs: Ambulating household distances limited by function/safety, 24hr mobility assistance needed, Intermittent ADL assistance needed, High falls risk due to function or environment  Evaluation/Treatment Tolerance: Patient tolerated treatment well  End of Session Communication: Bedside nurse, PCT/LEONARD/PRATIMA  End of Session Patient Position: Up in chair, Alarm on    Plan:  Treatment Interventions: ADL retraining, Functional transfer training, UE strengthening/ROM, Endurance training, Neuromuscular reeducation, Fine motor coordination activities, Compensatory technique education  OT Frequency: 5 times per week  OT Discharge Recommendations: Low intensity level of continued care  Equipment Recommended upon Discharge: Wheeled walker  OT Recommended Transfer Status: Assist of 1  OT - OK to Discharge: Yes  Based on completed evaluation and care plan recommendations, no barriers to discharge to next site of care.     Subjective     OT Visit Info:  OT Received On: 05/15/25    General:  General  Prior to Session Communication: Bedside nurse, PCT/LEONARD/PRATIMA  Patient Position Received: Up in chair, Alarm on  General Comment: Pt pleseant and cooperative throughout session. Left with all needs in reach following session.    Precautions:  Medical Precautions: Fall precautions    Pain Assessment:  Pain Assessment  Pain Assessment:  0-10  0-10 (Numeric) Pain Score:  (unrated back pain.)  Pain Type: Acute pain  Pain Location: Back  Pain Orientation: Lower  Pain Descriptors: Sore  Pain Interventions: Repositioned, Distraction  Response to Interventions: Absence of non-verbal indicators of pain    Objective   Cognition:  Overall Cognitive Status: Within Functional Limits  Orientation Level: Oriented X4    Activities of Daily Living:   Grooming  Grooming Level of Assistance: Modified independent  Grooming Where Assessed: Sitting sinkside  Grooming Comments: simulating home set up    UE Bathing  UE Bathing Level of Assistance: Modified independent  UE Bathing Where Assessed: Sitting sinkside  UE Bathing Comments: simulating home set up    LE Bathing  LE Bathing Level of Assistance: Modified independent  LE Bathing Where Assessed: Sitting sinkside, Standing sinkside  LE Bathing Comments: simulating home set up    UE Dressing  UE Dressing Level of Assistance: Modified independent  UE Dressing Where Assessed: Recliner    LE Dressing  LE Dressing: Yes  LE Dressing Adaptive Equipment: Sock aide  Sock Level of Assistance: Modified independent  Adult Briefs Level of Assistance: Modified independent  LE Dressing Comments: underwear, and socks; increased time though no issues    Toileting  Toileting Level of Assistance: Modified independent  Where Assessed: Toilet     Bed Mobility/Transfers:    Transfers  Transfer: Yes  Transfer 1  Transfer From 1: Sit to  Transfer to 1: Stand  Technique 1: Sit to stand  Transfer Device 1: Walker  Transfer Level of Assistance 1: Modified independent  Trials/Comments 1: for chair and tolet without difficulty      Functional Mobility:  Functional Mobility  Functional Mobility Performed: Yes  Functional Mobility 1  Surface 1: Level tile  Device 1: Rolling walker  Assistance 1: Close supervision    Outcome Measures:  [facility] Daily Activity  Putting on and taking off regular lower body clothing: None  Bathing (including washing,  rinsing, drying): None  Putting on and taking off regular upper body clothing: None  Toileting, which includes using toilet, bedpan or urinal: None  Taking care of personal grooming such as brushing teeth: None  Eating Meals: None  Daily Activity - Total Score: 24    Goals:  Encounter Problems       Encounter Problems (Active)       BALANCE       Pt will maintain dynamic standing balance during ADL task with modified independent level of assistance in order to demonstrate decreased risk of falling and improved postural control. (Progressing)       Start:  05/05/25    Expected End:  05/19/25               MOBILITY             Encounter Problems (Resolved)       ADLs       Patient will perform UB and LB bathing with modified independent level of assistance and grab bars, shower chair, and long-handled sponge. (Met)       Start:  05/05/25    Expected End:  05/19/25    Resolved:  05/15/25         Patient with complete lower body dressing with modified independent level of assistance donning and doffing all LE clothes  with PRN adaptive equipment while supported sitting (Met)       Start:  05/05/25    Expected End:  05/19/25    Resolved:  05/15/25         Patient will complete daily grooming tasks with modified independent level of assistance and PRN adaptive equipment while supported sitting and standing. (Met)       Start:  05/05/25    Expected End:  05/19/25    Resolved:  05/14/25         Patient will complete toileting including hygiene clothing management/hygiene with modified independent level of assistance and raised toilet seat and grab bars. (Met)       Start:  05/05/25    Expected End:  05/19/25    Resolved:  05/14/25            TRANSFERS       Patient will complete sit to stand transfer with modified independent level of assistance and least restrictive device in order to improve safety and prepare for out of bed mobility. (Met)       Start:  05/05/25    Expected End:  05/19/25    Resolved:  05/14/25

## 2025-05-16 VITALS
OXYGEN SATURATION: 93 % | HEART RATE: 77 BPM | BODY MASS INDEX: 31.66 KG/M2 | SYSTOLIC BLOOD PRESSURE: 189 MMHG | DIASTOLIC BLOOD PRESSURE: 88 MMHG | TEMPERATURE: 98.2 F | WEIGHT: 197 LBS | HEIGHT: 66 IN | RESPIRATION RATE: 18 BRPM

## 2025-05-16 DIAGNOSIS — I10 PRIMARY HYPERTENSION: ICD-10-CM

## 2025-05-16 PROBLEM — K59.00 CONSTIPATION: Status: RESOLVED | Noted: 2024-10-29 | Resolved: 2025-05-16

## 2025-05-16 PROBLEM — J01.00 ACUTE NON-RECURRENT MAXILLARY SINUSITIS: Status: RESOLVED | Noted: 2025-05-08 | Resolved: 2025-05-16

## 2025-05-16 PROBLEM — R33.8 ACUTE RETENTION OF URINE: Status: RESOLVED | Noted: 2025-05-08 | Resolved: 2025-05-16

## 2025-05-16 PROCEDURE — 2500000005 HC RX 250 GENERAL PHARMACY W/O HCPCS: Performed by: INTERNAL MEDICINE

## 2025-05-16 PROCEDURE — 97116 GAIT TRAINING THERAPY: CPT | Mod: GP,CQ

## 2025-05-16 PROCEDURE — 2500000001 HC RX 250 WO HCPCS SELF ADMINISTERED DRUGS (ALT 637 FOR MEDICARE OP): Performed by: INTERNAL MEDICINE

## 2025-05-16 PROCEDURE — 2500000004 HC RX 250 GENERAL PHARMACY W/ HCPCS (ALT 636 FOR OP/ED): Mod: JZ | Performed by: NURSE PRACTITIONER

## 2025-05-16 PROCEDURE — 94760 N-INVAS EAR/PLS OXIMETRY 1: CPT

## 2025-05-16 PROCEDURE — 2500000001 HC RX 250 WO HCPCS SELF ADMINISTERED DRUGS (ALT 637 FOR MEDICARE OP): Performed by: NURSE PRACTITIONER

## 2025-05-16 PROCEDURE — 2500000002 HC RX 250 W HCPCS SELF ADMINISTERED DRUGS (ALT 637 FOR MEDICARE OP, ALT 636 FOR OP/ED): Performed by: NURSE PRACTITIONER

## 2025-05-16 RX ADMIN — ACETAMINOPHEN 975 MG: 325 TABLET ORAL at 00:00

## 2025-05-16 RX ADMIN — SENNOSIDES 17.2 MG: 8.6 TABLET, FILM COATED ORAL at 09:27

## 2025-05-16 RX ADMIN — FUROSEMIDE 20 MG: 20 TABLET ORAL at 09:27

## 2025-05-16 RX ADMIN — ACETAMINOPHEN 975 MG: 325 TABLET ORAL at 09:27

## 2025-05-16 RX ADMIN — PROPRANOLOL HYDROCHLORIDE 120 MG: 60 CAPSULE, EXTENDED RELEASE ORAL at 09:27

## 2025-05-16 RX ADMIN — ENOXAPARIN SODIUM 40 MG: 40 INJECTION SUBCUTANEOUS at 09:27

## 2025-05-16 RX ADMIN — LIDOCAINE 4% 1 PATCH: 40 PATCH TOPICAL at 09:27

## 2025-05-16 RX ADMIN — LISINOPRIL 20 MG: 20 TABLET ORAL at 09:27

## 2025-05-16 ASSESSMENT — COGNITIVE AND FUNCTIONAL STATUS - GENERAL
MOBILITY SCORE: 21
MOBILITY SCORE: 22
HELP NEEDED FOR BATHING: A LITTLE
MOVING TO AND FROM BED TO CHAIR: A LITTLE
MOVING TO AND FROM BED TO CHAIR: A LITTLE
WALKING IN HOSPITAL ROOM: A LITTLE
DRESSING REGULAR LOWER BODY CLOTHING: A LITTLE
CLIMB 3 TO 5 STEPS WITH RAILING: A LITTLE
CLIMB 3 TO 5 STEPS WITH RAILING: A LITTLE
DAILY ACTIVITIY SCORE: 20
TOILETING: A LITTLE
DRESSING REGULAR UPPER BODY CLOTHING: A LITTLE

## 2025-05-16 ASSESSMENT — PAIN SCALES - GENERAL: PAINLEVEL_OUTOF10: 3

## 2025-05-16 ASSESSMENT — PAIN - FUNCTIONAL ASSESSMENT: PAIN_FUNCTIONAL_ASSESSMENT: 0-10

## 2025-05-16 NOTE — PROGRESS NOTES
Physical Therapy    Physical Therapy Treatment    Patient Name: Karina Lee  MRN: [id number]  Department: [facility]  Room: [address] [place] [address]  Today's Date: 5/16/2025  Time Calculation  Start Time: 0855  Stop Time: 0917  Time Calculation (min): 22 min    Assessment/Plan   PT Assessment  Rehab Prognosis: Fair  Barriers to Discharge Home: Physical needs  Caregiver Assistance: Patient lives alone and/or does not have reliable caregiver assistance  Physical Needs: Ambulating household distances limited by function/safety, Intermittent mobility assistance needed, High falls risk due to function or environment  End of Session Communication: PCT/NA/CTA, Bedside nurse  Assessment Comment: Mobility improving nicely as she approaches discharge.  End of Session Patient Position: Up in chair, Alarm on  PT Plan  Inpatient/Swing Bed or Outpatient: Swing Bed  PT Plan  Treatment/Interventions: Transfer training, Gait training  PT Plan: Ongoing PT  PT Frequency: BID  PT Discharge Recommendations: Low intensity level of continued care  Equipment Recommended upon Discharge: Wheeled walker  PT Recommended Transfer Status: Independent  PT - OK to Discharge: Yes    PT Visit Info:  PT Received On: 05/16/25  Response to Previous Treatment: Patient with no complaints from previous session.     General Visit Information:   General  Reason for Referral: Decrease mobility  Referred By: WILLA Fitzpatrick-CNP  Past Medical History Relevant to Rehab: PMH: Acute URI, osteoporosis, anxiety, OA, CVA, GERD, HTN  Prior to Session Communication: PCT/NA/CTA, Bedside nurse  Patient Position Received: Up in chair, Alarm on  Preferred Learning Style: verbal, written  General Comment: Pt pleseant and cooperative throughout session. Left with all needs in reach following session.    Subjective   Precautions:  Precautions  Medical Precautions: Fall precautions     Date/Time Vitals Session Patient Position Pulse Resp SpO2 BP MAP (mmHg)    05/16/25 0800 --  --  77  18   94 %  189/88  --           Objective   Pain:  Pain Assessment  Pain Assessment: 0-10  0-10 (Numeric) Pain Score: 3  Pain Type: Chronic pain  Pain Location: Back  Pain Orientation: Lower  Pain Descriptors: Aching  Pain Interventions: Medication (See MAR), Heat applied, Acupressure, Rest, Distraction  Cognition:  Cognition  Overall Cognitive Status: Within Functional Limits  Orientation Level: Oriented X4  Coordination:  Movements are Fluid and Coordinated: Yes  Postural Control:  Postural Control  Postural Control: Within Functional Limits  Static Sitting Balance  Static Sitting-Level of Assistance: Independent  Dynamic Sitting Balance  Dynamic Sitting-Level of Assistance: Independent  Static Standing Balance  Static Standing-Level of Assistance: Independent  Dynamic Standing Balance  Dynamic Standing-Level of Assistance: Distant supervision    Activity Tolerance:  Activity Tolerance  Endurance: Tolerates 30 min exercise with multiple rests  Treatments:  Therapeutic Activity  Therapeutic Activity Performed: Yes  Therapeutic Activity 1: Patient side stepping with walker around cones and weights in line on floor to simulate how patient will need to enter bathroom at home    Ambulation/Gait Training  Ambulation/Gait Training Performed: Yes  Ambulation/Gait Training 1  Surface 1: Level tile  Device 1: Rolling walker  Gait Support Devices: Gait belt  Assistance 1: Distant supervision, Modified independent  Quality of Gait 1: Wide base of support, Shuffling gait, Decreased step length  Comments/Distance (ft) 1: 150 ft x2  Transfers  Transfer: Yes  Transfer 1  Transfer From 1: Sit to  Transfer to 1: Stand  Technique 1: Sit to stand  Transfer Device 1: Walker, Gait belt  Transfer Level of Assistance 1: Independent    Stairs  Stairs: Yes  Stairs  Rails 1: Bilateral  Curb Step 1: Yes  Support Devices 1: Gait belt  Assistance 1: Distant supervision  Comment/Number of Steps 1: Single step up x5    Outcome Measures:  [facility] Basic  Mobility  Turning from your back to your side while in a flat bed without using bedrails: None  Moving from lying on your back to sitting on the side of a flat bed without using bedrails: None  Moving to and from bed to chair (including a wheelchair): A little  Standing up from a chair using your arms (e.g. wheelchair or bedside chair): None  To walk in hospital room: None  Climbing 3-5 steps with railing: A little  Basic Mobility - Total Score: 22    Encounter Problems       Encounter Problems (Active)       PT Problem       pt will be able to ambulate 20 ft with FWW and modified independent  (Progressing)       Start:  05/05/25    Expected End:  05/26/25            Pt will be able to navigate 1 4 inch threshold with no hand rail and modified independent to allow for safe DC.   (Progressing)       Start:  05/05/25    Expected End:  05/26/25            Pt will be able to perform STS transfer with modified independent  (Met)       Start:  05/05/25    Expected End:  05/26/25    Resolved:  05/14/25         pt will be able to propel self home distances with manual WC and modified independent  (Met)       Start:  05/05/25    Expected End:  05/26/25    Resolved:  05/14/25         Pt will demonstrate ability to  object from ground from sitting position, without a device and mod I (Progressing)       Start:  05/05/25    Expected End:  05/26/25            Pt will be able to navigate in narrow as needed w FWW to allow for entrance into home bathroom. (Progressing)       Start:  05/05/25    Expected End:  05/26/25               Pain - Adult

## 2025-05-16 NOTE — CARE PLAN
The clinical goals for the shift include Pt will use call bell to alert staff for assistance throughout the shift.    Over the shift, the patient did make progress toward the following goals. Pt resting with uneventful night. Scheduled Tylenol given. Ambulated to bathroom with walker, SBA. Understanding POC and will discharged home in the morning. Denies any needs at this time. Bed/chair alarm and call light within reach.

## 2025-05-16 NOTE — DISCHARGE SUMMARY
Discharge Diagnosis  Closed right hip fracture, sequela       Issues Requiring Follow-Up  Follow up with PCP    Discharge Meds     Medication List      START taking these medications     furosemide 20 mg tablet; Commonly known as: Lasix; Take 1 tablet by   mouth once daily   lisinopril 20 mg tablet; Take 1 tablet by mouth once daily     CHANGE how you take these medications     oxyCODONE 5 mg immediate release tablet; Commonly known as: Roxicodone;   Take 1 tablet (5 mg) by mouth every 6 hours if needed for moderate pain (4   - 6).; What changed: Another medication with the same name was removed.   Continue taking this medication, and follow the directions you see here.     CONTINUE taking these medications     aspirin 81 mg chewable tablet   atorvastatin 20 mg tablet; Commonly known as: Lipitor; Take 1 tablet by   mouth once daily   buPROPion  mg 24 hr tablet; Commonly known as: Wellbutrin XL; Take   1 tablet by mouth once daily   escitalopram 20 mg tablet; Commonly known as: Lexapro; Take 1 tablet by   mouth once daily   magnesium hydroxide 2,400 mg/10 mL suspension suspension; Commonly known   as: Milk of Magnesia; Take 10 mL by mouth once daily as needed for   constipation.   melatonin 3 mg tablet; Take 1 tablet (3 mg) by mouth once daily at   bedtime.   ondansetron ODT 4 mg disintegrating tablet; Commonly known as:   Zofran-ODT; Dissolve 1 tablet (4 mg) in the mouth every 8 hours if needed   for nausea.   propranolol  mg 24 hr capsule; Commonly known as: Inderal LA; Take   1 capsule (120 mg) by mouth once daily. Do not crush, chew, or split.   sennosides 8.6 mg tablet; Commonly known as: Senokot; Take 2 tablets   (17.2 mg) by mouth once daily.     STOP taking these medications     amoxicillin-clavulanate 875-125 mg tablet; Commonly known as: Augmentin   LORazepam 1 mg tablet; Commonly known as: Ativan   meloxicam 15 mg tablet; Commonly known as: Mobic       Test Results Pending At Discharge  Pending  Labs       Order Current Status    Extra Urine Gray Tube Collected (05/12/25 0101)    Urinalysis with Reflex Culture and Microscopic In process            Hospital Course   Karina Lee is a 84 y.o. female  with PMHx significant for a fall. She fell and hit her right hip on the ground.   CT right hip Nondisplaced fracture of the greater trochanter of the proximal right femur. She is non-surgical and expressed that she would not want surgical intervention. Patient is pain controlled, completed physical therapy and is being discharged today with [facility] PT/OT.   Right Greater trochanter of femur fracture  Fall at home  - PT/OT evaluation  - WBAT  - Follow up with orthopedic outpatient, non surgical  - oxycodone 5 mg q6h, prn  Low back pain  ~add heating pad  ~add voltaren and DC meloxicam 2/2 renal failure     UTI  ~20-80 e. Coli with sensitivities above.   ~fosfomycin 3 GM X 1 dose given     Syncope and collapse-resolved  ASHD  Dyslipidemia  Essential HTN  - continue furosemide 20 mg daily, aspirin 81 mg daily, atorvastatin 20 mg daily  - lisinopril 20 mg daily     Chronic kidney failure, Stage 3a  Urine retention  - baseline creatine 1.18  ~DC meloxicam           Thrombocytopenia  Normocytic anemia  -   - Hb 11.3;      Anxiety and depression  - continue bupropion 150 mg daily, escitalopram 20 mg daily, propranolol 120 mg daily    Pertinent Physical Exam At Time of Discharge  Constitutional:       Appearance: Normal appearance. She is obese.   HENT:      Head: Normocephalic and atraumatic.      Right Ear: External ear normal.      Left Ear: External ear normal.      Nose: clear     Mouth/Throat: clear     Mouth: Mucous membranes are moist.      Pharynx: Oropharynx is clear.   Eyes:      Conjunctiva/sclera: Conjunctivae normal.      Pupils: Pupils are equal, round, and reactive to light.   Cardiovascular:      Rate and Rhythm: Normal rate and regular rhythm.      Pulses: Normal pulses.       Heart sounds: Normal heart sounds.   Pulmonary:      Effort: Pulmonary effort is normal.      Breath sounds: Normal breath sounds.   Abdominal:      General: Bowel sounds are normal.      Palpations: Abdomen is soft.   Musculoskeletal:         General: Swelling and tenderness present. Right hip.     Cervical back: Normal range of motion and neck supple.      Comments: Limited ROM to right leg               ~brace right knee area. Multiple dressings noted to right knee with generalized edema  Skin:     General: Skin is warm and dry.      Findings: Bruising present.   Neurological:      General: No focal deficit present.      Mental Status: She is alert and oriented to person, place, and time.   Psychiatric:         Mood and Affect: Mood normal.         Behavior: Behavior normal.     Outpatient Follow-Up  Future Appointments   Date Time Provider Department Center   6/17/2025  5:45 PM Madison Craft DO [id number]         WILLA Agudelo-CNP

## 2025-05-16 NOTE — CARE PLAN
The patient's goals for the shift include discharge to home      The clinical goals for the shift include Pt will use call bell to alert staff for assistance throughout the shift.    Over the shift, the patient did not make progress toward the following goals. Barriers to progression include none. Recommendations to address these barriers include follow nursing and therapy care plan and medication regimen.

## 2025-05-19 ENCOUNTER — PATIENT OUTREACH (OUTPATIENT)
Dept: PRIMARY CARE | Facility: CLINIC | Age: 85
End: 2025-05-19
Payer: MEDICARE

## 2025-05-19 RX ORDER — LISINOPRIL 20 MG/1
20 TABLET ORAL DAILY
Qty: 30 TABLET | Refills: 0 | Status: SHIPPED | OUTPATIENT
Start: 2025-05-19

## 2025-05-19 NOTE — PROGRESS NOTES
Discharge Facility: [facility]  Discharge Diagnosis: Closed right hip fracture, sequela   Admission Date: 5/4/25  Discharge Date: 5/16/25    PCP Appointment Date: 5/27/25  Specialist Appointment Date: n/a  Hospital Encounter and Summary Linked: Admission (Discharged) with Elias Patino MD (05/04/2025)   Discharge Summary by Kerri Robin APRN-CNP (05/16/2025 12:35)   See discharge assessment below for further details    Wrap Up  Wrap Up Additional Comments: Pt. Reports she is doing well at home. She is ambulating with assistive devices. Pt. Lives alone, but her daughter is in from [place] to help pt. With her ADLs. Pt. Reports that she is using PRN Oxycodone with effect for  pain. Denies further questions/concerns at this time. This callers contact information for non-emergent questions/concerns. (5/19/2025  2:28 PM)    Engagement  Call Start Time: -- (Call completed with Karina) (5/19/2025  2:28 PM)    Medications  Medications reviewed with patient/caregiver?: Yes (5/19/2025  2:28 PM)  Is the patient having any side effects they believe may be caused by any medication additions or changes?: No (5/19/2025  2:28 PM)  Does the patient have all medications ordered at discharge?: Yes (5/19/2025  2:28 PM)  Care Management Interventions: No intervention needed (5/19/2025  2:28 PM)  Prescription Comments: START taking: furosemide (Lasix)  CHANGE how you take: oxyCODONE (Roxicodone)  STOP taking: amoxicillin-clavulanate 875-125 mg tablet (Augmentin) LORazepam 1 mg tablet (Ativan) meloxicam 15 mg tablet (Mobic) (5/19/2025  2:28 PM)  Is the patient taking all medications as directed (includes completed medication regime)?: Yes (5/19/2025  2:28 PM)  Care Management Interventions: Provided patient education (5/19/2025  2:28 PM)  Medication Comments: Pt. denies any medication questions/concners (5/19/2025  2:28 PM)    Appointments  Does the patient have a primary care provider?: Yes (5/19/2025  2:28 PM)  Care Management  Interventions: Verified appointment date/time/provider; Educated patient on importance of making appointment (5/19/2025  2:28 PM)  Has the patient kept scheduled appointments due by today?: Yes (5/19/2025  2:28 PM)  Care Management Interventions: Advised patient to keep appointment; Educated on importance of keeping appointment (5/19/2025  2:28 PM)    Self Management  What is the home health agency?: [facility]  (5/19/2025  2:28 PM)  Has home health visited the patient within 72 hours of discharge?: Call prior to 72 hours (5/19/2025  2:28 PM)  What Durable Medical Equipment (DME) was ordered?: Pt. has a walker, w/c, rolator, for assitive devices. (5/19/2025  2:28 PM)    Patient Teaching  Does the patient have access to their discharge instructions?: Yes (5/19/2025  2:28 PM)  Care Management Interventions: Reviewed instructions with patient (5/19/2025  2:28 PM)  What is the patient's perception of their health status since discharge?: Improving (5/19/2025  2:28 PM)  Is the patient/caregiver able to teach back the hierarchy of who to call/visit for symptoms/problems? PCP, Specialist, Home Health nurse, Urgent Care, ED, 911: Yes (5/19/2025  2:28 PM)  Patient/Caregiver Education Comments: Pt. denies any new concerns post hospital discharge. (5/19/2025  2:28 PM)

## 2025-05-19 NOTE — PROGRESS NOTES
05/15/25 1524   Discharge Planning   Living Arrangements Children   Support Systems Children   Assistance Needed Pt is set to leave tomorrow for home. Pt signed and given a coy of the [employer] 05/15/25. Pt yuan have ot and pt going home [facility]. Pt daughter is set to pick her up and all in agreement on time leaving.   Type of Residence Private residence   Who is requesting discharge planning? Patient   Home or Post Acute Services In home services   Type of Home Care Services Home OT;Home PT   Expected Discharge Disposition Home   Patient Choice   Provider Choice list and CMS website (https://medicare.gov/care-compare#search) for post-acute Quality and Resource Measure Data were provided and reviewed with: Patient   Patient / Family choosing to utilize agency / facility established prior to hospitalization No   Stroke Family Assessment   Stroke Family Assessment Needed No   Intensity of Service   Intensity of Service 0-30 min     JENNIFER sent fax to insurance company with the [employer] 05/15/25. JENNIFER OLIVAS

## 2025-05-27 ENCOUNTER — APPOINTMENT (OUTPATIENT)
Dept: PRIMARY CARE | Facility: CLINIC | Age: 85
End: 2025-05-27
Payer: MEDICARE

## 2025-05-27 VITALS
SYSTOLIC BLOOD PRESSURE: 182 MMHG | DIASTOLIC BLOOD PRESSURE: 79 MMHG | BODY MASS INDEX: 34.39 KG/M2 | WEIGHT: 214 LBS | HEART RATE: 53 BPM | HEIGHT: 66 IN

## 2025-05-27 DIAGNOSIS — S72.009S CLOSED FRACTURE OF HIP, UNSPECIFIED LATERALITY, SEQUELA: Primary | ICD-10-CM

## 2025-05-27 DIAGNOSIS — I69.351 HEMIPLEGIA AND HEMIPARESIS FOLLOWING CEREBRAL INFARCTION AFFECTING RIGHT DOMINANT SIDE (MULTI): ICD-10-CM

## 2025-05-27 DIAGNOSIS — E11.8 DIABETES MELLITUS TYPE 2 WITH COMPLICATIONS (MULTI): ICD-10-CM

## 2025-05-27 DIAGNOSIS — I11.0 HYPERTENSIVE HEART DISEASE WITH HEART FAILURE: ICD-10-CM

## 2025-05-27 DIAGNOSIS — S72.009S CLOSED FRACTURE OF HIP, UNSPECIFIED LATERALITY, SEQUELA: ICD-10-CM

## 2025-05-27 DIAGNOSIS — F33.1 MAJOR DEPRESSIVE DISORDER, RECURRENT, MODERATE: ICD-10-CM

## 2025-05-27 DIAGNOSIS — N18.31 CHRONIC KIDNEY DISEASE, STAGE 3A (MULTI): ICD-10-CM

## 2025-05-27 PROCEDURE — G0439 PPPS, SUBSEQ VISIT: HCPCS | Performed by: FAMILY MEDICINE

## 2025-05-27 PROCEDURE — 1170F FXNL STATUS ASSESSED: CPT | Performed by: FAMILY MEDICINE

## 2025-05-27 PROCEDURE — 1036F TOBACCO NON-USER: CPT | Performed by: FAMILY MEDICINE

## 2025-05-27 PROCEDURE — 1159F MED LIST DOCD IN RCRD: CPT | Performed by: FAMILY MEDICINE

## 2025-05-27 PROCEDURE — 1158F ADVNC CARE PLAN TLK DOCD: CPT | Performed by: FAMILY MEDICINE

## 2025-05-27 PROCEDURE — 3077F SYST BP >= 140 MM HG: CPT | Performed by: FAMILY MEDICINE

## 2025-05-27 PROCEDURE — 3078F DIAST BP <80 MM HG: CPT | Performed by: FAMILY MEDICINE

## 2025-05-27 PROCEDURE — 1111F DSCHRG MED/CURRENT MED MERGE: CPT | Performed by: FAMILY MEDICINE

## 2025-05-27 PROCEDURE — 99495 TRANSJ CARE MGMT MOD F2F 14D: CPT | Performed by: FAMILY MEDICINE

## 2025-05-27 RX ORDER — OXYCODONE HYDROCHLORIDE 5 MG/1
5 TABLET ORAL EVERY 4 HOURS PRN
Qty: 30 TABLET | Refills: 0 | Status: SHIPPED | OUTPATIENT
Start: 2025-05-27 | End: 2025-05-27 | Stop reason: SDUPTHER

## 2025-05-27 RX ORDER — OXYCODONE HYDROCHLORIDE 5 MG/1
5 TABLET ORAL EVERY 6 HOURS PRN
Qty: 28 TABLET | Refills: 0 | Status: SHIPPED | OUTPATIENT
Start: 2025-05-27 | End: 2025-06-03

## 2025-05-27 ASSESSMENT — PATIENT HEALTH QUESTIONNAIRE - PHQ9
10. IF YOU CHECKED OFF ANY PROBLEMS, HOW DIFFICULT HAVE THESE PROBLEMS MADE IT FOR YOU TO DO YOUR WORK, TAKE CARE OF THINGS AT HOME, OR GET ALONG WITH OTHER PEOPLE: EXTREMELY DIFFICULT
1. LITTLE INTEREST OR PLEASURE IN DOING THINGS: NEARLY EVERY DAY
2. FEELING DOWN, DEPRESSED OR HOPELESS: SEVERAL DAYS
SUM OF ALL RESPONSES TO PHQ9 QUESTIONS 1 AND 2: 4

## 2025-05-27 ASSESSMENT — ACTIVITIES OF DAILY LIVING (ADL)
DRESSING: INDEPENDENT
GROCERY_SHOPPING: TOTAL CARE
TAKING_MEDICATION: NEEDS ASSISTANCE
MANAGING_FINANCES: INDEPENDENT
BATHING: NEEDS ASSISTANCE
DOING_HOUSEWORK: TOTAL CARE

## 2025-05-27 NOTE — PATIENT INSTRUCTIONS
Stop aleve, meloxicam, and lasix    I am sending oxycodone and it will be written for 3 times a day but I'd like you to start out with once or twice a day at followup.

## 2025-05-28 ENCOUNTER — TELEPHONE (OUTPATIENT)
Dept: PRIMARY CARE | Facility: CLINIC | Age: 85
End: 2025-05-28
Payer: MEDICARE

## 2025-05-28 NOTE — TELEPHONE ENCOUNTER
Nakia from [facility] called and stated she saw the patient today and her daughters are concerned the patient is having dementia symptoms; leaving the water running all night, tien holman. Nakia asked what Dr. Craft would recommend, possible Neurology referral?    Return call to Nakia @ [phone]

## 2025-05-28 NOTE — TELEPHONE ENCOUNTER
For now I am just planning to do a memory test when I see her next. We can discuss neurology after that.

## 2025-05-30 ENCOUNTER — PATIENT OUTREACH (OUTPATIENT)
Dept: PRIMARY CARE | Facility: CLINIC | Age: 85
End: 2025-05-30
Payer: MEDICARE

## 2025-05-30 NOTE — PROGRESS NOTES
Unable to reach patient for follow up call after recent hospitalization.   Left voicemail with call back number for patient to call if needed  to assist with any questions or concerns patient may have.  Office Visit with Madison Craft DO (05/27/2025)

## 2025-05-31 DIAGNOSIS — F41.9 ANXIETY: ICD-10-CM

## 2025-06-01 LAB — BACTERIA UR CULT: NORMAL

## 2025-06-02 RX ORDER — ESCITALOPRAM OXALATE 20 MG/1
20 TABLET ORAL DAILY
Qty: 30 TABLET | Refills: 0 | Status: SHIPPED | OUTPATIENT
Start: 2025-06-02

## 2025-06-04 ENCOUNTER — TELEPHONE (OUTPATIENT)
Dept: PRIMARY CARE | Facility: CLINIC | Age: 85
End: 2025-06-04
Payer: MEDICARE

## 2025-06-04 NOTE — TELEPHONE ENCOUNTER
Speech therapy went to [facility] and did an evaluation as she was referred for having a hard time swallowing pills. The pt passed all test with flying colors however. Even the swallow test, range of motion, and was able to swallow pills. Therapist did suggest crushing pills where able. Just wanted to up date you.

## 2025-06-12 NOTE — PATIENT INSTRUCTIONS
Lab Work:  Schedule an appointment for labs at [email or web address]/patient or call [phone] 24 hours a day, 7 days a week.   You can also download the [facility] lab scheduling tan on your phone.     Radiology [phone]

## 2025-06-12 NOTE — PROGRESS NOTES
"Subjective   Patient ID: Karina Lee is a 84 y.o. female who presents for Follow-up (3 months; PT eval today, she'll be doing another month).    Hip fracture: She has taken oxycodone once, pain is getting much better. She is doing PT and making progress, believes she will be done by July 4th. She completed urine but not bloodwork.      Tremor: She hs been getting worsening tremor. It is worse in the morning. She is dealing with more arm and leg weakness. She is having a hard time getting out of bed in the morning. Not sleeping well at night.      Grief, poor short term memory: She is following with Shelley interiano. Doing better lately. She is currently on lexapro and has ativan for prn use which she is using sparingly. She does not plan on getting any further refills of the ativan at this point. She is taking 1/2 ativan at night. She sleeps rather well when she does take the ativan. She only takes it at night about 2 hours before she goes     DM: Diet controlled. Completed recent labs.      Hemorrhagic stroke: Dizziness is chronic/unchanged.     HTN: On labetalol and lisinopril, no SE's.     HLD: Back on atorvastatin, no SE's.     Osteoporosis, osteoarthritis: Taking meloxicam.     All other systems have been reviewed and are negative for complaint     Objective   /81 (BP Location: Left arm, Patient Position: Sitting, BP Cuff Size: Large adult)   Pulse 58   Ht 1.676 m (5' 6\")   Wt 97.1 kg (214 lb)   BMI 34.54 kg/m²     Gen: No acute distress, alert and oriented x3, pleasant   HEENT: moist mucous membranes, b/l external auditory canals are clear of debris, TMs within normal limits, no oropharyngeal lesions, eomi, perrla   Neck: thyroid within normal limits, no lymphadenopathy   CV: RRR, normal S1/S2, no murmur   Resp: Clear to auscultation bilaterally, no wheezes or rhonchi appreciated  Abd: soft, nontender, non-distended, no guarding/rigidity, bowel sounds present  Extr: no edema, no calf " tenderness  Derm: Skin is warm and dry, no rashes appreciated  Psych: mood is good, affect is congruent, good hygiene, normal speech and eye contact  Neuro: cranial nerves grossly intact, she is wheelchair bound today unable to eval gait      Assessment/Plan   #CKD:  Stop lasix and meloxicam  Did not recheck labs, will send orders to [facility]     #Hip fracture  Home PT  Rx sent oxycodone to be used sparingly  Discussed monitoring for constipation    #MCI  MOCA June 2025 21/30  Check rpr and b12  Recheck 1 year     #Recurrent UTI  Ordering and giving equipment for in home collection from home health     #HTN  She is back on lasix  She is on inderal as well     #Poor short term memory  MOCA 21/30 (8/23/22)  discussed support systems that could be helpful     #Depression:  #Insomnia  on lexapro and wellbutrin  She is taking 1/2 tab ativan at bedtime   CSA signed Aug 2024  Ordering UDS     #Hemorrhagic stroke  has residual symptoms for previous stroke (weakness, dizziness)  On statin, BP is controlled, recently completed at home PT  Refill meclizine for dizziness     #Renal cysts  seen on CT/MRI by nephro- no need for intervention.  Monitored by imaging through nephro (Dr. Briscoe)     #HLD  Controlled on atorvastatin     #DM  diet controlled, monitoring a1c     #L shoulder pain  cervical spondylosis- on meloxicam     #Cervical facet syndrome  not currently seeing pain management     #Osteoporosis  on bisphosphonates, will continue management     HCM:  UTD for flu, COVID vaccine  Past the age for screening, will go based on symptoms

## 2025-06-16 ENCOUNTER — TELEPHONE (OUTPATIENT)
Dept: PRIMARY CARE | Facility: CLINIC | Age: 85
End: 2025-06-16
Payer: MEDICARE

## 2025-06-16 NOTE — TELEPHONE ENCOUNTER
Home health nurse called in to notify you that her BP was 160/90 at 11:55 has not taken her morning pills or eaten anything yet.

## 2025-06-16 NOTE — TELEPHONE ENCOUNTER
Ok. I would prefer readings an hour after taking BP meds and in the form of a 2 week list. If they call again feel free to let them know.

## 2025-06-17 ENCOUNTER — APPOINTMENT (OUTPATIENT)
Dept: PRIMARY CARE | Facility: CLINIC | Age: 85
End: 2025-06-17
Payer: MEDICARE

## 2025-06-17 VITALS
WEIGHT: 214 LBS | HEIGHT: 66 IN | BODY MASS INDEX: 34.39 KG/M2 | DIASTOLIC BLOOD PRESSURE: 81 MMHG | HEART RATE: 58 BPM | SYSTOLIC BLOOD PRESSURE: 174 MMHG

## 2025-06-17 DIAGNOSIS — G31.84 MILD COGNITIVE IMPAIRMENT: Primary | ICD-10-CM

## 2025-06-17 PROCEDURE — 3077F SYST BP >= 140 MM HG: CPT | Performed by: FAMILY MEDICINE

## 2025-06-17 PROCEDURE — 3079F DIAST BP 80-89 MM HG: CPT | Performed by: FAMILY MEDICINE

## 2025-06-17 PROCEDURE — 99214 OFFICE O/P EST MOD 30 MIN: CPT | Performed by: FAMILY MEDICINE

## 2025-06-17 PROCEDURE — 1036F TOBACCO NON-USER: CPT | Performed by: FAMILY MEDICINE

## 2025-06-19 LAB
ALBUMIN SERPL-MCNC: 4.4 G/DL (ref 3.6–5.1)
ALBUMIN/CREAT UR: 11 MG/G CREAT
ALP SERPL-CCNC: 85 U/L (ref 37–153)
ALT SERPL-CCNC: 34 U/L (ref 6–29)
ANION GAP SERPL CALCULATED.4IONS-SCNC: 8 MMOL/L (CALC) (ref 7–17)
AST SERPL-CCNC: 31 U/L (ref 10–35)
BASOPHILS # BLD AUTO: 22 CELLS/UL (ref 0–200)
BASOPHILS NFR BLD AUTO: 0.6 %
BILIRUB SERPL-MCNC: 0.4 MG/DL (ref 0.2–1.2)
BUN SERPL-MCNC: 21 MG/DL (ref 7–25)
CALCIUM SERPL-MCNC: 9.7 MG/DL (ref 8.6–10.4)
CHLORIDE SERPL-SCNC: 105 MMOL/L (ref 98–110)
CO2 SERPL-SCNC: 27 MMOL/L (ref 20–32)
CREAT SERPL-MCNC: 1.27 MG/DL (ref 0.6–0.95)
CREAT UR-MCNC: 108 MG/DL (ref 20–275)
EGFRCR SERPLBLD CKD-EPI 2021: 42 ML/MIN/1.73M2
EOSINOPHIL # BLD AUTO: 90 CELLS/UL (ref 15–500)
EOSINOPHIL NFR BLD AUTO: 2.5 %
ERYTHROCYTE [DISTWIDTH] IN BLOOD BY AUTOMATED COUNT: 12.8 % (ref 11–15)
EST. AVERAGE GLUCOSE BLD GHB EST-MCNC: 120 MG/DL
EST. AVERAGE GLUCOSE BLD GHB EST-SCNC: 6.6 MMOL/L
GLUCOSE SERPL-MCNC: 109 MG/DL (ref 65–139)
HBA1C MFR BLD: 5.8 %
HCT VFR BLD AUTO: 42.5 % (ref 35–45)
HGB BLD-MCNC: 13.9 G/DL (ref 11.7–15.5)
LYMPHOCYTES # BLD AUTO: 1055 CELLS/UL (ref 850–3900)
LYMPHOCYTES NFR BLD AUTO: 29.3 %
MCH RBC QN AUTO: 32.9 PG (ref 27–33)
MCHC RBC AUTO-ENTMCNC: 32.7 G/DL (ref 32–36)
MCV RBC AUTO: 100.7 FL (ref 80–100)
MICROALBUMIN UR-MCNC: 1.2 MG/DL
MONOCYTES # BLD AUTO: 349 CELLS/UL (ref 200–950)
MONOCYTES NFR BLD AUTO: 9.7 %
NEUTROPHILS # BLD AUTO: 2084 CELLS/UL (ref 1500–7800)
NEUTROPHILS NFR BLD AUTO: 57.9 %
PLATELET # BLD AUTO: 176 THOUSAND/UL (ref 140–400)
PMV BLD REES-ECKER: 10.1 FL (ref 7.5–12.5)
POTASSIUM SERPL-SCNC: 4.7 MMOL/L (ref 3.5–5.3)
PROT SERPL-MCNC: 7.2 G/DL (ref 6.1–8.1)
RBC # BLD AUTO: 4.22 MILLION/UL (ref 3.8–5.1)
SODIUM SERPL-SCNC: 140 MMOL/L (ref 135–146)
WBC # BLD AUTO: 3.6 THOUSAND/UL (ref 3.8–10.8)

## 2025-06-22 DIAGNOSIS — I10 PRIMARY HYPERTENSION: ICD-10-CM

## 2025-06-22 LAB
T PALLIDUM AB SER QL IA: NEGATIVE
VIT B12 SERPL-MCNC: 310 PG/ML (ref 200–1100)

## 2025-06-23 RX ORDER — LISINOPRIL 20 MG/1
20 TABLET ORAL DAILY
Qty: 30 TABLET | Refills: 0 | Status: SHIPPED | OUTPATIENT
Start: 2025-06-23

## 2025-06-24 DIAGNOSIS — E78.5 HYPERLIPIDEMIA, UNSPECIFIED HYPERLIPIDEMIA TYPE: ICD-10-CM

## 2025-06-24 RX ORDER — ATORVASTATIN CALCIUM 20 MG/1
20 TABLET, FILM COATED ORAL DAILY
Qty: 100 TABLET | Refills: 0 | Status: SHIPPED | OUTPATIENT
Start: 2025-06-24

## 2025-07-01 DIAGNOSIS — R21 RASH: ICD-10-CM

## 2025-07-01 RX ORDER — CLOTRIMAZOLE AND BETAMETHASONE DIPROPIONATE 10; .64 MG/G; MG/G
1 CREAM TOPICAL 2 TIMES DAILY
COMMUNITY
End: 2025-07-01 | Stop reason: SDUPTHER

## 2025-07-01 RX ORDER — CLOTRIMAZOLE AND BETAMETHASONE DIPROPIONATE 10; .64 MG/G; MG/G
1 CREAM TOPICAL 2 TIMES DAILY
Qty: 15 G | Refills: 1 | Status: SHIPPED | OUTPATIENT
Start: 2025-07-01

## 2025-07-21 DIAGNOSIS — I10 PRIMARY HYPERTENSION: ICD-10-CM

## 2025-07-21 RX ORDER — LISINOPRIL 20 MG/1
20 TABLET ORAL DAILY
Qty: 30 TABLET | Refills: 0 | Status: SHIPPED | OUTPATIENT
Start: 2025-07-21

## 2025-07-24 DIAGNOSIS — M85.80 OSTEOPENIA AFTER MENOPAUSE: ICD-10-CM

## 2025-07-24 DIAGNOSIS — Z78.0 OSTEOPENIA AFTER MENOPAUSE: ICD-10-CM

## 2025-07-24 DIAGNOSIS — S72.001S CLOSED RIGHT HIP FRACTURE, SEQUELA: Primary | ICD-10-CM

## 2025-07-25 DIAGNOSIS — F32.A DEPRESSION, UNSPECIFIED DEPRESSION TYPE: ICD-10-CM

## 2025-07-25 RX ORDER — BUPROPION HYDROCHLORIDE 150 MG/1
150 TABLET ORAL DAILY
Qty: 90 TABLET | Refills: 0 | Status: SHIPPED | OUTPATIENT
Start: 2025-07-25

## 2025-09-23 ENCOUNTER — APPOINTMENT (OUTPATIENT)
Dept: PRIMARY CARE | Facility: CLINIC | Age: 85
End: 2025-09-23
Payer: MEDICARE